# Patient Record
Sex: FEMALE | Race: WHITE | NOT HISPANIC OR LATINO | Employment: OTHER | URBAN - METROPOLITAN AREA
[De-identification: names, ages, dates, MRNs, and addresses within clinical notes are randomized per-mention and may not be internally consistent; named-entity substitution may affect disease eponyms.]

---

## 2017-01-11 ENCOUNTER — HISTORICAL (OUTPATIENT)
Dept: INTERNAL MEDICINE | Facility: CLINIC | Age: 77
End: 2017-01-11

## 2017-02-02 ENCOUNTER — HISTORICAL (OUTPATIENT)
Dept: CARDIOLOGY | Facility: HOSPITAL | Age: 77
End: 2017-02-02

## 2017-02-03 ENCOUNTER — HISTORICAL (OUTPATIENT)
Dept: CARDIOLOGY | Facility: HOSPITAL | Age: 77
End: 2017-02-03

## 2017-02-08 ENCOUNTER — HISTORICAL (OUTPATIENT)
Dept: CARDIOLOGY | Facility: HOSPITAL | Age: 77
End: 2017-02-08

## 2017-02-13 ENCOUNTER — HISTORICAL (OUTPATIENT)
Dept: RADIOLOGY | Facility: HOSPITAL | Age: 77
End: 2017-02-13

## 2017-03-09 ENCOUNTER — HISTORICAL (OUTPATIENT)
Dept: CARDIOLOGY | Facility: HOSPITAL | Age: 77
End: 2017-03-09

## 2017-06-26 ENCOUNTER — HISTORICAL (OUTPATIENT)
Dept: CARDIOLOGY | Facility: CLINIC | Age: 77
End: 2017-06-26

## 2017-06-28 ENCOUNTER — HISTORICAL (OUTPATIENT)
Dept: CARDIOLOGY | Facility: HOSPITAL | Age: 77
End: 2017-06-28

## 2017-06-28 LAB
APTT PPP: 34.8 SECOND(S) (ref 23.3–37)
BUN SERPL-MCNC: 16 MG/DL (ref 7–18)
CALCIUM SERPL-MCNC: 10 MG/DL (ref 8.5–10.1)
CHLORIDE SERPL-SCNC: 108 MMOL/L (ref 98–107)
CO2 SERPL-SCNC: 28 MMOL/L (ref 21–32)
CREAT SERPL-MCNC: 0.8 MG/DL (ref 0.6–1.3)
ERYTHROCYTE [DISTWIDTH] IN BLOOD BY AUTOMATED COUNT: 13.4 % (ref 11.5–14.5)
GLUCOSE SERPL-MCNC: 111 MG/DL (ref 74–106)
HCT VFR BLD AUTO: 40.9 % (ref 35–46)
HGB BLD-MCNC: 13.8 GM/DL (ref 12–16)
INR PPP: 1.15 (ref 0.9–1.2)
MCH RBC QN AUTO: 29.2 PG (ref 26–34)
MCHC RBC AUTO-ENTMCNC: 33.7 GM/DL (ref 31–37)
MCV RBC AUTO: 86.7 FL (ref 80–100)
PLATELET # BLD AUTO: 181 X10(3)/MCL (ref 130–400)
PMV BLD AUTO: 9.2 FL (ref 7.4–10.4)
POTASSIUM SERPL-SCNC: 4.9 MMOL/L (ref 3.5–5.1)
PROTHROMBIN TIME: 14.5 SECOND(S) (ref 11.9–14.4)
RBC # BLD AUTO: 4.72 X10(6)/MCL (ref 4–5.2)
SODIUM SERPL-SCNC: 144 MMOL/L (ref 136–145)
WBC # SPEC AUTO: 6.8 X10(3)/MCL (ref 4.5–11)

## 2017-08-07 ENCOUNTER — HISTORICAL (OUTPATIENT)
Dept: CARDIOLOGY | Facility: CLINIC | Age: 77
End: 2017-08-07

## 2017-08-07 LAB
BUN SERPL-MCNC: 14 MG/DL (ref 7–18)
CALCIUM SERPL-MCNC: 9.8 MG/DL (ref 8.5–10.1)
CHLORIDE SERPL-SCNC: 106 MMOL/L (ref 98–107)
CO2 SERPL-SCNC: 28 MMOL/L (ref 21–32)
CREAT SERPL-MCNC: 0.8 MG/DL (ref 0.6–1.3)
GLUCOSE SERPL-MCNC: 125 MG/DL (ref 74–106)
POTASSIUM SERPL-SCNC: 4.9 MMOL/L (ref 3.5–5.1)
SODIUM SERPL-SCNC: 140 MMOL/L (ref 136–145)

## 2018-03-03 ENCOUNTER — HISTORICAL (OUTPATIENT)
Dept: LAB | Facility: HOSPITAL | Age: 78
End: 2018-03-03

## 2018-03-03 LAB
ALBUMIN SERPL-MCNC: 3.9 GM/DL (ref 3.4–5)
ALBUMIN/GLOB SERPL: 1 RATIO (ref 1–2)
ALP SERPL-CCNC: 65 UNIT/L (ref 45–117)
ALT SERPL-CCNC: 19 UNIT/L (ref 12–78)
AST SERPL-CCNC: 32 UNIT/L (ref 15–37)
BILIRUB SERPL-MCNC: 0.9 MG/DL (ref 0.2–1)
BILIRUBIN DIRECT+TOT PNL SERPL-MCNC: 0.3 MG/DL
BILIRUBIN DIRECT+TOT PNL SERPL-MCNC: 0.6 MG/DL
BUN SERPL-MCNC: 13 MG/DL (ref 7–18)
CALCIUM SERPL-MCNC: 9.3 MG/DL (ref 8.5–10.1)
CHLORIDE SERPL-SCNC: 109 MMOL/L (ref 98–107)
CHOLEST SERPL-MCNC: 108 MG/DL
CHOLEST/HDLC SERPL: 2 {RATIO} (ref 0–4.4)
CO2 SERPL-SCNC: 29 MMOL/L (ref 21–32)
CREAT SERPL-MCNC: 0.7 MG/DL (ref 0.6–1.3)
GLOBULIN SER-MCNC: 3.8 GM/ML (ref 2.3–3.5)
GLUCOSE SERPL-MCNC: 116 MG/DL (ref 74–106)
HDLC SERPL-MCNC: 55 MG/DL
LDLC SERPL CALC-MCNC: 34 MG/DL (ref 0–130)
POTASSIUM SERPL-SCNC: 4.7 MMOL/L (ref 3.5–5.1)
PROT SERPL-MCNC: 7.7 GM/DL (ref 6.4–8.2)
SODIUM SERPL-SCNC: 142 MMOL/L (ref 136–145)
TRIGL SERPL-MCNC: 95 MG/DL
VLDLC SERPL CALC-MCNC: 19 MG/DL

## 2018-03-23 ENCOUNTER — HISTORICAL (OUTPATIENT)
Dept: INTERNAL MEDICINE | Facility: CLINIC | Age: 78
End: 2018-03-23

## 2018-09-24 ENCOUNTER — HISTORICAL (OUTPATIENT)
Dept: CARDIOLOGY | Facility: CLINIC | Age: 78
End: 2018-09-24

## 2018-09-24 LAB
ALBUMIN SERPL-MCNC: 4 GM/DL (ref 3.4–5)
ALBUMIN/GLOB SERPL: 1 RATIO (ref 1–2)
ALP SERPL-CCNC: 66 UNIT/L (ref 45–117)
ALT SERPL-CCNC: 26 UNIT/L (ref 12–78)
AST SERPL-CCNC: 38 UNIT/L (ref 15–37)
BILIRUB SERPL-MCNC: 1.3 MG/DL (ref 0.2–1)
BILIRUBIN DIRECT+TOT PNL SERPL-MCNC: 0.3 MG/DL
BILIRUBIN DIRECT+TOT PNL SERPL-MCNC: 1 MG/DL
BUN SERPL-MCNC: 16 MG/DL (ref 7–18)
CALCIUM SERPL-MCNC: 9.8 MG/DL (ref 8.5–10.1)
CHLORIDE SERPL-SCNC: 108 MMOL/L (ref 98–107)
CHOLEST SERPL-MCNC: 95 MG/DL
CHOLEST/HDLC SERPL: 2.2 {RATIO} (ref 0–4.4)
CO2 SERPL-SCNC: 30 MMOL/L (ref 21–32)
CREAT SERPL-MCNC: 0.9 MG/DL (ref 0.6–1.3)
GLOBULIN SER-MCNC: 3.9 GM/ML (ref 2.3–3.5)
GLUCOSE SERPL-MCNC: 102 MG/DL (ref 74–106)
HDLC SERPL-MCNC: 44 MG/DL
LDLC SERPL CALC-MCNC: 36 MG/DL (ref 0–130)
POTASSIUM SERPL-SCNC: 5.1 MMOL/L (ref 3.5–5.1)
PROT SERPL-MCNC: 7.9 GM/DL (ref 6.4–8.2)
SODIUM SERPL-SCNC: 141 MMOL/L (ref 136–145)
TRIGL SERPL-MCNC: 75 MG/DL
VLDLC SERPL CALC-MCNC: 15 MG/DL

## 2019-03-21 ENCOUNTER — HISTORICAL (OUTPATIENT)
Dept: CARDIOLOGY | Facility: CLINIC | Age: 79
End: 2019-03-21

## 2019-03-21 LAB
ALBUMIN SERPL-MCNC: 3.7 GM/DL (ref 3.4–5)
ALBUMIN/GLOB SERPL: 1.1 RATIO (ref 1.1–2)
ALP SERPL-CCNC: 66 UNIT/L (ref 45–117)
ALT SERPL-CCNC: 52 UNIT/L (ref 12–78)
AST SERPL-CCNC: 62 UNIT/L (ref 15–37)
BILIRUB SERPL-MCNC: 0.7 MG/DL (ref 0.2–1)
BILIRUBIN DIRECT+TOT PNL SERPL-MCNC: 0.2 MG/DL
BILIRUBIN DIRECT+TOT PNL SERPL-MCNC: 0.5 MG/DL
BUN SERPL-MCNC: 10 MG/DL (ref 7–18)
CALCIUM SERPL-MCNC: 9.5 MG/DL (ref 8.5–10.1)
CHLORIDE SERPL-SCNC: 111 MMOL/L (ref 98–107)
CHOLEST SERPL-MCNC: 102 MG/DL
CHOLEST/HDLC SERPL: 2 {RATIO} (ref 0–4.4)
CO2 SERPL-SCNC: 28 MMOL/L (ref 21–32)
CREAT SERPL-MCNC: 0.8 MG/DL (ref 0.6–1.3)
GLOBULIN SER-MCNC: 3.5 GM/ML (ref 2.3–3.5)
GLUCOSE SERPL-MCNC: 102 MG/DL (ref 74–106)
HDLC SERPL-MCNC: 52 MG/DL
LDLC SERPL CALC-MCNC: 39 MG/DL (ref 0–130)
POTASSIUM SERPL-SCNC: 5 MMOL/L (ref 3.5–5.1)
PROT SERPL-MCNC: 7.2 GM/DL (ref 6.4–8.2)
SODIUM SERPL-SCNC: 142 MMOL/L (ref 136–145)
TRIGL SERPL-MCNC: 54 MG/DL
VLDLC SERPL CALC-MCNC: 11 MG/DL

## 2020-06-25 ENCOUNTER — HISTORICAL (OUTPATIENT)
Dept: RADIOLOGY | Facility: HOSPITAL | Age: 80
End: 2020-06-25

## 2020-09-11 ENCOUNTER — HISTORICAL (OUTPATIENT)
Dept: LAB | Facility: HOSPITAL | Age: 80
End: 2020-09-11

## 2020-09-11 LAB
ABS NEUT (OLG): 2.68 X10(3)/MCL (ref 2.1–9.2)
ALBUMIN SERPL-MCNC: 3.8 GM/DL (ref 3.4–5)
ALBUMIN/GLOB SERPL: 1 RATIO (ref 1.1–2)
ALP SERPL-CCNC: 66 UNIT/L (ref 45–117)
ALT SERPL-CCNC: 25 UNIT/L (ref 12–78)
APPEARANCE, UA: CLEAR
AST SERPL-CCNC: 39 UNIT/L (ref 15–37)
BACTERIA #/AREA URNS AUTO: ABNORMAL /HPF
BASOPHILS # BLD AUTO: 0 X10(3)/MCL (ref 0–0.2)
BASOPHILS NFR BLD AUTO: 1 %
BILIRUB SERPL-MCNC: 1.1 MG/DL (ref 0.2–1)
BILIRUB UR QL STRIP: NEGATIVE
BILIRUBIN DIRECT+TOT PNL SERPL-MCNC: 0.3 MG/DL (ref 0–0.2)
BILIRUBIN DIRECT+TOT PNL SERPL-MCNC: 0.8 MG/DL
BUN SERPL-MCNC: 13 MG/DL (ref 7–18)
CALCIUM SERPL-MCNC: 10 MG/DL (ref 8.5–10.1)
CHLORIDE SERPL-SCNC: 110 MMOL/L (ref 98–107)
CHOLEST SERPL-MCNC: 105 MG/DL
CHOLEST/HDLC SERPL: 2.1 {RATIO} (ref 0–4.4)
CO2 SERPL-SCNC: 28 MMOL/L (ref 21–32)
COLOR UR: ABNORMAL
CREAT SERPL-MCNC: 0.8 MG/DL (ref 0.6–1.3)
EOSINOPHIL # BLD AUTO: 0.2 X10(3)/MCL (ref 0–0.9)
EOSINOPHIL NFR BLD AUTO: 3 %
ERYTHROCYTE [DISTWIDTH] IN BLOOD BY AUTOMATED COUNT: 13.6 % (ref 11.5–14.5)
GLOBULIN SER-MCNC: 3.7 GM/ML (ref 2.3–3.5)
GLUCOSE (UA): NEGATIVE
GLUCOSE SERPL-MCNC: 120 MG/DL (ref 74–106)
HCT VFR BLD AUTO: 39.2 % (ref 35–46)
HDLC SERPL-MCNC: 50 MG/DL (ref 40–59)
HGB BLD-MCNC: 12.6 GM/DL (ref 12–16)
HGB UR QL STRIP: NEGATIVE
HYALINE CASTS #/AREA URNS LPF: ABNORMAL /LPF
IMM GRANULOCYTES # BLD AUTO: 0.01 10*3/UL
IMM GRANULOCYTES NFR BLD AUTO: 0 %
KETONES UR QL STRIP: NEGATIVE
LDLC SERPL CALC-MCNC: 40 MG/DL
LEUKOCYTE ESTERASE UR QL STRIP: 75 LEU/UL
LYMPHOCYTES # BLD AUTO: 2.3 X10(3)/MCL (ref 0.6–4.6)
LYMPHOCYTES NFR BLD AUTO: 40 %
MCH RBC QN AUTO: 29.2 PG (ref 26–34)
MCHC RBC AUTO-ENTMCNC: 32.1 GM/DL (ref 31–37)
MCV RBC AUTO: 91 FL (ref 80–100)
MONOCYTES # BLD AUTO: 0.5 X10(3)/MCL (ref 0.1–1.3)
MONOCYTES NFR BLD AUTO: 9 %
NEUTROPHILS # BLD AUTO: 2.68 X10(3)/MCL (ref 2.1–9.2)
NEUTROPHILS NFR BLD AUTO: 47 %
NITRITE UR QL STRIP: ABNORMAL
PH UR STRIP: 6.5 [PH] (ref 4.5–8)
PLATELET # BLD AUTO: 144 X10(3)/MCL (ref 130–400)
PMV BLD AUTO: 9.5 FL (ref 7.4–10.4)
POTASSIUM SERPL-SCNC: 4.5 MMOL/L (ref 3.5–5.1)
PROT SERPL-MCNC: 7.5 GM/DL (ref 6.4–8.2)
PROT UR QL STRIP: NEGATIVE
RBC # BLD AUTO: 4.31 X10(6)/MCL (ref 4–5.2)
RBC #/AREA URNS AUTO: ABNORMAL /HPF
SODIUM SERPL-SCNC: 143 MMOL/L (ref 136–145)
SP GR UR STRIP: 1.01 (ref 1–1.03)
SQUAMOUS #/AREA URNS LPF: ABNORMAL /LPF
TRIGL SERPL-MCNC: 77 MG/DL
UROBILINOGEN UR STRIP-ACNC: NORMAL
VLDLC SERPL CALC-MCNC: 15 MG/DL
WBC # SPEC AUTO: 5.7 X10(3)/MCL (ref 4.5–11)
WBC #/AREA URNS AUTO: ABNORMAL /HPF

## 2020-11-06 ENCOUNTER — HISTORICAL (OUTPATIENT)
Dept: RADIOLOGY | Facility: HOSPITAL | Age: 80
End: 2020-11-06

## 2021-08-06 ENCOUNTER — HISTORICAL (OUTPATIENT)
Dept: ADMINISTRATIVE | Facility: HOSPITAL | Age: 81
End: 2021-08-06

## 2021-08-06 LAB
ABS NEUT (OLG): 2.77 X10(3)/MCL (ref 2.1–9.2)
ALBUMIN SERPL-MCNC: 3.8 GM/DL (ref 3.4–4.8)
ALBUMIN/GLOB SERPL: 1.2 RATIO (ref 1.1–2)
ALP SERPL-CCNC: 63 UNIT/L (ref 40–150)
ALT SERPL-CCNC: 16 UNIT/L (ref 0–55)
AST SERPL-CCNC: 34 UNIT/L (ref 5–34)
BASOPHILS # BLD AUTO: 0 X10(3)/MCL (ref 0–0.2)
BASOPHILS NFR BLD AUTO: 1 %
BILIRUB SERPL-MCNC: 1.2 MG/DL
BILIRUBIN DIRECT+TOT PNL SERPL-MCNC: 0.5 MG/DL (ref 0–0.5)
BILIRUBIN DIRECT+TOT PNL SERPL-MCNC: 0.7 MG/DL (ref 0–0.8)
BUN SERPL-MCNC: 12 MG/DL (ref 9.8–20.1)
CALCIUM SERPL-MCNC: 10.3 MG/DL (ref 8.4–10.2)
CHLORIDE SERPL-SCNC: 110 MMOL/L (ref 98–107)
CHOLEST SERPL-MCNC: 101 MG/DL
CHOLEST/HDLC SERPL: 3 {RATIO} (ref 0–5)
CO2 SERPL-SCNC: 25 MMOL/L (ref 23–31)
CREAT SERPL-MCNC: 0.77 MG/DL (ref 0.55–1.02)
EOSINOPHIL # BLD AUTO: 0.2 X10(3)/MCL (ref 0–0.9)
EOSINOPHIL NFR BLD AUTO: 3 %
ERYTHROCYTE [DISTWIDTH] IN BLOOD BY AUTOMATED COUNT: 13.6 % (ref 11.5–14.5)
GLOBULIN SER-MCNC: 3.1 GM/DL (ref 2.4–3.5)
GLUCOSE SERPL-MCNC: 111 MG/DL (ref 82–115)
HCT VFR BLD AUTO: 38.3 % (ref 35–46)
HDLC SERPL-MCNC: 39 MG/DL (ref 35–60)
HGB BLD-MCNC: 12.5 GM/DL (ref 12–16)
IMM GRANULOCYTES # BLD AUTO: 0.01 10*3/UL
IMM GRANULOCYTES NFR BLD AUTO: 0 %
LDLC SERPL CALC-MCNC: 49 MG/DL (ref 50–140)
LYMPHOCYTES # BLD AUTO: 2.1 X10(3)/MCL (ref 0.6–4.6)
LYMPHOCYTES NFR BLD AUTO: 38 %
MCH RBC QN AUTO: 29.5 PG (ref 26–34)
MCHC RBC AUTO-ENTMCNC: 32.6 GM/DL (ref 31–37)
MCV RBC AUTO: 90.3 FL (ref 80–100)
MONOCYTES # BLD AUTO: 0.5 X10(3)/MCL (ref 0.1–1.3)
MONOCYTES NFR BLD AUTO: 9 %
NEUTROPHILS # BLD AUTO: 2.77 X10(3)/MCL (ref 2.1–9.2)
NEUTROPHILS NFR BLD AUTO: 50 %
NRBC BLD AUTO-RTO: 0 % (ref 0–0.2)
PLATELET # BLD AUTO: 166 X10(3)/MCL (ref 130–400)
PMV BLD AUTO: 9.9 FL (ref 7.4–10.4)
POTASSIUM SERPL-SCNC: 4.4 MMOL/L (ref 3.5–5.1)
PROT SERPL-MCNC: 6.9 GM/DL (ref 5.8–7.6)
RBC # BLD AUTO: 4.24 X10(6)/MCL (ref 4–5.2)
SODIUM SERPL-SCNC: 141 MMOL/L (ref 136–145)
TRIGL SERPL-MCNC: 67 MG/DL (ref 37–140)
VLDLC SERPL CALC-MCNC: 13 MG/DL
WBC # SPEC AUTO: 5.6 X10(3)/MCL (ref 4.5–11)

## 2021-08-19 ENCOUNTER — HISTORICAL (OUTPATIENT)
Dept: INFECTIOUS DISEASES | Facility: HOSPITAL | Age: 81
End: 2021-08-19

## 2022-01-03 ENCOUNTER — HISTORICAL (OUTPATIENT)
Dept: ADMINISTRATIVE | Facility: HOSPITAL | Age: 82
End: 2022-01-03

## 2022-02-08 ENCOUNTER — HISTORICAL (OUTPATIENT)
Dept: ADMINISTRATIVE | Facility: HOSPITAL | Age: 82
End: 2022-02-08

## 2022-04-10 ENCOUNTER — HISTORICAL (OUTPATIENT)
Dept: ADMINISTRATIVE | Facility: HOSPITAL | Age: 82
End: 2022-04-10
Payer: MEDICAID

## 2022-04-18 ENCOUNTER — HISTORICAL (OUTPATIENT)
Dept: RESPIRATORY THERAPY | Facility: HOSPITAL | Age: 82
End: 2022-04-18
Payer: MEDICAID

## 2022-04-26 VITALS
OXYGEN SATURATION: 100 % | WEIGHT: 214.5 LBS | DIASTOLIC BLOOD PRESSURE: 82 MMHG | HEIGHT: 63 IN | BODY MASS INDEX: 38.01 KG/M2 | SYSTOLIC BLOOD PRESSURE: 130 MMHG

## 2022-06-01 ENCOUNTER — OFFICE VISIT (OUTPATIENT)
Dept: URGENT CARE | Facility: CLINIC | Age: 82
End: 2022-06-01
Payer: MEDICAID

## 2022-06-01 VITALS
WEIGHT: 216.81 LBS | HEIGHT: 63 IN | TEMPERATURE: 98 F | SYSTOLIC BLOOD PRESSURE: 99 MMHG | OXYGEN SATURATION: 98 % | RESPIRATION RATE: 20 BRPM | DIASTOLIC BLOOD PRESSURE: 67 MMHG | HEART RATE: 88 BPM | BODY MASS INDEX: 38.41 KG/M2

## 2022-06-01 DIAGNOSIS — Z11.52 ENCOUNTER FOR SCREENING FOR COVID-19: Primary | ICD-10-CM

## 2022-06-01 DIAGNOSIS — U07.1 COVID-19 VIRUS DETECTED: ICD-10-CM

## 2022-06-01 LAB
CTP QC/QA: YES
SARS-COV-2 RDRP RESP QL NAA+PROBE: POSITIVE

## 2022-06-01 PROCEDURE — 99203 OFFICE O/P NEW LOW 30 MIN: CPT | Mod: S$PBB,,, | Performed by: NURSE PRACTITIONER

## 2022-06-01 PROCEDURE — 99203 PR OFFICE/OUTPT VISIT, NEW, LEVL III, 30-44 MIN: ICD-10-PCS | Mod: S$PBB,,, | Performed by: NURSE PRACTITIONER

## 2022-06-01 PROCEDURE — 99214 OFFICE O/P EST MOD 30 MIN: CPT | Mod: PBBFAC | Performed by: NURSE PRACTITIONER

## 2022-06-01 PROCEDURE — U0002 COVID-19 LAB TEST NON-CDC: HCPCS | Mod: PBBFAC | Performed by: NURSE PRACTITIONER

## 2022-06-01 RX ORDER — LISINOPRIL 10 MG/1
TABLET ORAL
COMMUNITY
End: 2022-07-28

## 2022-06-01 RX ORDER — LOSARTAN POTASSIUM 25 MG/1
25 TABLET ORAL DAILY
COMMUNITY
Start: 2022-03-30 | End: 2022-10-31 | Stop reason: SDUPTHER

## 2022-06-01 RX ORDER — ATORVASTATIN CALCIUM 40 MG/1
TABLET, FILM COATED ORAL
COMMUNITY
End: 2022-10-31 | Stop reason: SDUPTHER

## 2022-06-01 RX ORDER — FLUTICASONE PROPIONATE 50 MCG
SPRAY, SUSPENSION (ML) NASAL
COMMUNITY
Start: 2022-03-05 | End: 2022-07-28

## 2022-06-01 RX ORDER — APIXABAN 5 MG/1
5 TABLET, FILM COATED ORAL 2 TIMES DAILY
COMMUNITY
Start: 2022-03-30 | End: 2022-10-31 | Stop reason: SDUPTHER

## 2022-06-01 RX ORDER — METOPROLOL TARTRATE 25 MG/1
25 TABLET, FILM COATED ORAL 2 TIMES DAILY
COMMUNITY
Start: 2022-05-09 | End: 2022-10-31 | Stop reason: SDUPTHER

## 2022-06-02 NOTE — PROGRESS NOTES
"Subjective:      Patient ID: Gilmar Vera is a 81 y.o. female.    Chief Complaint: COVID-19 Concerns (Positive at home test)    81-year-old female presents to the clinic with her daughter, patient decline translation services and request her daughter to translate.  The daughter state her  tested positive with COVID-19 where his symptoms started last week on Thursday.  The daughter states her mother started with scratchy throat, short of breath and not feeling well on Saturday.  Daughter stated her mother had COVID 19 infection last year and she had received monoclonal therapy.  Daughter tested her mother at home today and was positive for COVID-19.  Daughter requesting monoclonal therapy for her mother.  Daughter state mother complaining of a headache, scratchy throat and a cough that started yesterday with a runny nose.  Denies taking any medication for fever.  Patient is afebrile in the clinic.  Patient state her Mother feeling fatigued and run down,  Denies any nausea or vomiting or diarrhea.    Review of Systems   Constitutional: Positive for fatigue.   HENT: Positive for rhinorrhea.    Respiratory: Positive for cough.    Neurological: Positive for headaches.   All other systems reviewed and are negative.      BP 99/67   Pulse 88   Temp 98.4 °F (36.9 °C)   Resp 20   Ht 5' 2.99" (1.6 m)   Wt 98.3 kg (216 lb 12.8 oz)   SpO2 98%   BMI 38.41 kg/m²      Current Outpatient Medications:     CHLORPHENIRAMINE-DEXTROMETHORP ORAL, Coricidin HBP Cough and Cold Take 1 tablet (Oral) 4 times per day PRN - Congestion 20220305 tablet 4 times per day Oral No set duration recorded days active 4-30 mg, Disp: , Rfl:     fluticasone propionate (FLONASE ALLERGY RELIEF) 50 mcg/actuation nasal spray, Flonase Allergy Relief Take 2 spray(s) (Intranasal) 1 time per day for 7 days 20220305 spray,suspension 1 time per day Intranasal 7 days active 50 mcg/actuation, Disp: , Rfl:     atorvastatin (LIPITOR) 40 MG tablet, " atorvastatin Take No date recorded No form recorded No frequency recorded No route recorded No set duration recorded No set duration amount recorded active No dosage strength recorded No dosage strength units of measure recorded, Disp: , Rfl:     ELIQUIS 5 mg Tab, Take 5 mg by mouth 2 (two) times daily., Disp: , Rfl:     lisinopriL 10 MG tablet, Zestril Take No date recorded No form recorded No frequency recorded No route recorded No set duration recorded No set duration amount recorded active No dosage strength recorded No dosage strength units of measure recorded, Disp: , Rfl:     losartan (COZAAR) 25 MG tablet, Take 25 mg by mouth once daily., Disp: , Rfl:     metoprolol tartrate (LOPRESSOR) 25 MG tablet, Take 25 mg by mouth 2 (two) times daily., Disp: , Rfl:     Objective:     Physical Exam  Vitals and nursing note reviewed.   Constitutional:       Appearance: Normal appearance.   HENT:      Head: Normocephalic and atraumatic.   Eyes:      Pupils: Pupils are equal, round, and reactive to light.   Cardiovascular:      Rate and Rhythm: Normal rate and regular rhythm.      Pulses: Normal pulses.      Heart sounds: Normal heart sounds.   Pulmonary:      Effort: Pulmonary effort is normal. No respiratory distress.      Breath sounds: Normal breath sounds. No wheezing or rhonchi.   Musculoskeletal:         General: Normal range of motion.      Cervical back: Normal range of motion and neck supple.      Comments: Using cane to ambulate   Skin:     General: Skin is warm.      Capillary Refill: Capillary refill takes less than 2 seconds.   Neurological:      General: No focal deficit present.      Mental Status: She is alert and oriented to person, place, and time. Mental status is at baseline.   Psychiatric:         Mood and Affect: Mood normal.         Behavior: Behavior normal.         Thought Content: Thought content normal.         Judgment: Judgment normal.       Assessment:     Problem List Items Addressed  This Visit    None     Visit Diagnoses     Encounter for screening for COVID-19    -  Primary    Relevant Orders    POCT COVID-19 Rapid Screening (Completed)          Plan:   Discussed physical exam findings with patient, discuss vital signs, discussed COVID-19 id now positive in the clinic, monoclonal therapy initiated, continue social distancing, mass when, good hygiene and follow CDC recommendation are COVID-19, stay hydrated with fluids specially water, OTC supplementation sheet provided to patient.  Instructed patients daughter to notify his/ her primary care provider regarding the visit today and schedule a follow-up appointment in 2-3 days if needed   instructed patients over to come back to the clinic or go to nearest emergency room department if symptoms worsens or no improvement or for any other reason   questions elicited and answered, monitor pulse ox at home if O2 sat drops below 9 3 and symptom worsens bring patient to the nearest emergency room department or call 911.   Patient and daughter verbalized understanding of discharge instructions,  verbalizes understanding to read discharge instructions    Encounter for screening for COVID-19  -     POCT COVID-19 Rapid Screening         Recent Lab Results       06/01/22  1908         Acceptable Yes       SARS-CoV-2 RNA, Amplification, Qual Positive                     This note was created with the assistance of a voice recognition software or  phone dictation. There may be transcription errors as a result of using this technology, however minimal effort has been made to assure accuracy of transcription, but any obvious errors or omissions should be clarified with the author of the document.

## 2022-06-25 ENCOUNTER — HOSPITAL ENCOUNTER (EMERGENCY)
Facility: HOSPITAL | Age: 82
Discharge: HOME | End: 2022-06-25
Attending: STUDENT IN AN ORGANIZED HEALTH CARE EDUCATION/TRAINING PROGRAM
Payer: MEDICAID

## 2022-06-25 ENCOUNTER — OFFICE VISIT (OUTPATIENT)
Dept: URGENT CARE | Facility: CLINIC | Age: 82
End: 2022-06-25
Payer: MEDICAID

## 2022-06-25 VITALS
RESPIRATION RATE: 18 BRPM | HEIGHT: 62 IN | SYSTOLIC BLOOD PRESSURE: 115 MMHG | WEIGHT: 213 LBS | DIASTOLIC BLOOD PRESSURE: 70 MMHG | HEART RATE: 98 BPM | TEMPERATURE: 98 F | BODY MASS INDEX: 39.2 KG/M2 | OXYGEN SATURATION: 99 %

## 2022-06-25 VITALS
DIASTOLIC BLOOD PRESSURE: 70 MMHG | RESPIRATION RATE: 18 BRPM | OXYGEN SATURATION: 95 % | TEMPERATURE: 98 F | WEIGHT: 219.88 LBS | SYSTOLIC BLOOD PRESSURE: 105 MMHG | HEIGHT: 63 IN | BODY MASS INDEX: 38.96 KG/M2 | HEART RATE: 86 BPM

## 2022-06-25 DIAGNOSIS — R20.0 NUMBNESS IN RIGHT LEG: ICD-10-CM

## 2022-06-25 DIAGNOSIS — M79.89 LEG SWELLING: ICD-10-CM

## 2022-06-25 DIAGNOSIS — R60.0 EDEMA OF RIGHT LOWER EXTREMITY: Primary | ICD-10-CM

## 2022-06-25 LAB — SARS-COV-2 RDRP RESP QL NAA+PROBE: NEGATIVE

## 2022-06-25 PROCEDURE — 99213 PR OFFICE/OUTPT VISIT, EST, LEVL III, 20-29 MIN: ICD-10-PCS | Mod: S$PBB,,, | Performed by: NURSE PRACTITIONER

## 2022-06-25 PROCEDURE — 99214 OFFICE O/P EST MOD 30 MIN: CPT | Mod: PBBFAC,25 | Performed by: NURSE PRACTITIONER

## 2022-06-25 PROCEDURE — 87635 SARS-COV-2 COVID-19 AMP PRB: CPT | Performed by: STUDENT IN AN ORGANIZED HEALTH CARE EDUCATION/TRAINING PROGRAM

## 2022-06-25 PROCEDURE — 99283 EMERGENCY DEPT VISIT LOW MDM: CPT | Mod: 25,27

## 2022-06-25 PROCEDURE — 99213 OFFICE O/P EST LOW 20 MIN: CPT | Mod: S$PBB,,, | Performed by: NURSE PRACTITIONER

## 2022-06-25 NOTE — PROGRESS NOTES
"Subjective:       Patient ID: Gilmar Vera is a 81 y.o. female.    Vitals:  height is 5' 2.99" (1.6 m) and weight is 99.7 kg (219 lb 14.4 oz). Her oral temperature is 98.3 °F (36.8 °C). Her blood pressure is 105/70 and her pulse is 86. Her respiration is 18 and oxygen saturation is 95%.     Chief Complaint: Leg Swelling (NUMBNESS, RT LEG)    Patient is an 81-year-old female, here today for right leg swelling, numbness over the past 2 days.  Patient completed by family member who helped answer questions.  States patient is on a medication for swelling, which she takes as needed, thinks the medication is furosemide but states that patient is allergic to Lasix and says this has cause leg swelling in the past.  Patient denies pain at this time.       Cardiovascular: Positive for leg swelling.   Respiratory: Negative.        Objective:      Physical Exam   Constitutional: She is oriented to person, place, and time. She appears well-developed.   HENT:   Head: Normocephalic.   Eyes: Conjunctivae and EOM are normal. Pupils are equal, round, and reactive to light.   Neck: Neck supple.   Cardiovascular: Normal rate, regular rhythm and normal heart sounds.   Pulmonary/Chest: Effort normal and breath sounds normal.   Musculoskeletal:      Right lower leg: Edema present.      Comments: Calf cirumference R- 44.5 cm, L 41.5 cm   Neurological: She is alert and oriented to person, place, and time.   Skin: Skin is warm and dry.   Psychiatric: Her behavior is normal.   Vitals reviewed.        Assessment:       1. Edema of right lower extremity    2. Numbness in right leg               No results found.   Plan:         Discussed with pt and family member, will send to ED to r/o dvt. Pt transferred to ED via  by [facility] staff.     Edema of right lower extremity  -     Refer to Emergency Dept.    Numbness in right leg  -     Refer to Emergency Dept.                   "

## 2022-06-26 NOTE — ED PROVIDER NOTES
Encounter Date: 6/25/2022       History     Chief Complaint   Patient presents with    Leg Swelling     Presents with leg swelling that suddenly started yesterday.  Sent here from clinic to rule out DVT.   Hx AFIB and is on thinners.     81-year-old female presenting today with right-sided leg swelling.  It started yesterday morning.  She has a history of leg swelling and is currently on Lasix for it.  She is also on Eliquis for AFib.  She has no pain in her right lower extremity.  She went to urgent care urgent care center here to rule out a DVT.  She is in no acute distress at this time.    The history is provided by the patient. No  was used.   General Illness   The current episode started just prior to arrival. The problem occurs continuously. The problem has been unchanged. Nothing relieves the symptoms. Nothing aggravates the symptoms. Pertinent negatives include no fever, no abdominal pain, no diarrhea, no nausea, no vomiting, no congestion, no headaches, no cough, no shortness of breath, no wheezing, no discharge, no pain and no eye redness.     Review of patient's allergies indicates:   Allergen Reactions    Furosemide      Past Medical History:   Diagnosis Date    Clotting disorder     Hyperlipidemia     Hypertension      History reviewed. No pertinent surgical history.  History reviewed. No pertinent family history.  Social History     Tobacco Use    Smoking status: Never Smoker    Smokeless tobacco: Never Used   Substance Use Topics    Alcohol use: Not Currently    Drug use: Not Currently     Review of Systems   Constitutional: Negative for activity change, chills and fever.   HENT: Negative for congestion and facial swelling.    Eyes: Negative for pain, discharge and redness.   Respiratory: Negative for cough, shortness of breath and wheezing.    Cardiovascular: Positive for leg swelling. Negative for chest pain.   Gastrointestinal: Negative for abdominal pain, diarrhea,  nausea and vomiting.   Genitourinary: Negative for difficulty urinating, vaginal bleeding and vaginal discharge.   Musculoskeletal: Negative for gait problem and neck stiffness.   Skin: Negative for color change and pallor.   Neurological: Negative for dizziness and headaches.       Physical Exam     Initial Vitals [06/25/22 1854]   BP Pulse Resp Temp SpO2   107/69 76 18 98.4 °F (36.9 °C) 96 %      MAP       --         Physical Exam    Nursing note and vitals reviewed.  Constitutional: She appears well-developed. She is not diaphoretic. No distress.   HENT:   Head: Normocephalic and atraumatic.   Eyes: Conjunctivae and EOM are normal. Right eye exhibits no discharge. Left eye exhibits no discharge.   Neck: Neck supple. No tracheal deviation present.   Normal range of motion.  Cardiovascular: Normal rate and regular rhythm.   Pulmonary/Chest: No respiratory distress.   Abdominal: Abdomen is soft. She exhibits no distension. There is no abdominal tenderness.   Musculoskeletal:         General: Normal range of motion.      Cervical back: Normal range of motion and neck supple.      Comments: Swollen right lower extremity.  Warm to the touch.  Good pulses.  Neurovascularly intact.     Neurological: She is alert and oriented to person, place, and time. She has normal strength.   Skin: Skin is warm and dry.         ED Course   Procedures  Labs Reviewed   SARS-COV-2 RNA AMPLIFICATION, QUAL          Imaging Results    None          Medications - No data to display  Medical Decision Making:   ED Management:  Pt presents to the ED to rule out a DVT    Evaluated patient in the emergency department.  She was stable well appearing.  On examination she did have mildly swollen right lower extremity.  Had good pulses and was neurovascularly intact.  Was warm.  Her DVT ultrasound was negative.  Spoke with her daughter and had her follow-up with her PCP on Monday.  She is also instructed to wear compression  stockings.  She is being  discharged home in stable condition at this time.                      Clinical Impression:   Final diagnoses:  [M79.89] Leg swelling          ED Disposition Condition    Discharge Stable        ED Prescriptions     None        Follow-up Information     Follow up With Specialties Details Why Contact Info    Francisca Estrada MD Family Medicine Call in 1 day  [address] [facility]  Gynecology Clinic  [facility] [id number] [phone]             Anderson Up MD  06/25/22 9778

## 2022-07-28 ENCOUNTER — OFFICE VISIT (OUTPATIENT)
Dept: CARDIOLOGY | Facility: CLINIC | Age: 82
End: 2022-07-28
Payer: MEDICAID

## 2022-07-28 VITALS
HEIGHT: 64 IN | OXYGEN SATURATION: 97 % | DIASTOLIC BLOOD PRESSURE: 66 MMHG | TEMPERATURE: 98 F | SYSTOLIC BLOOD PRESSURE: 120 MMHG | HEART RATE: 88 BPM | BODY MASS INDEX: 37.16 KG/M2 | WEIGHT: 217.63 LBS

## 2022-07-28 DIAGNOSIS — I50.20 HEART FAILURE WITH REDUCED EJECTION FRACTION: ICD-10-CM

## 2022-07-28 DIAGNOSIS — R06.09 DOE (DYSPNEA ON EXERTION): ICD-10-CM

## 2022-07-28 DIAGNOSIS — R60.0 PERIPHERAL EDEMA: ICD-10-CM

## 2022-07-28 DIAGNOSIS — I25.119 CORONARY ARTERY DISEASE INVOLVING NATIVE CORONARY ARTERY OF NATIVE HEART WITH ANGINA PECTORIS: ICD-10-CM

## 2022-07-28 DIAGNOSIS — I48.20 CHRONIC ATRIAL FIBRILLATION: ICD-10-CM

## 2022-07-28 PROCEDURE — 99214 PR OFFICE/OUTPT VISIT, EST, LEVL IV, 30-39 MIN: ICD-10-PCS | Mod: 25,S$PBB,, | Performed by: NURSE PRACTITIONER

## 2022-07-28 PROCEDURE — 3074F SYST BP LT 130 MM HG: CPT | Mod: CPTII,,, | Performed by: NURSE PRACTITIONER

## 2022-07-28 PROCEDURE — 1160F PR REVIEW ALL MEDS BY PRESCRIBER/CLIN PHARMACIST DOCUMENTED: ICD-10-PCS | Mod: CPTII,,, | Performed by: NURSE PRACTITIONER

## 2022-07-28 PROCEDURE — 3288F FALL RISK ASSESSMENT DOCD: CPT | Mod: CPTII,,, | Performed by: NURSE PRACTITIONER

## 2022-07-28 PROCEDURE — 1159F MED LIST DOCD IN RCRD: CPT | Mod: CPTII,,, | Performed by: NURSE PRACTITIONER

## 2022-07-28 PROCEDURE — 3288F PR FALLS RISK ASSESSMENT DOCUMENTED: ICD-10-PCS | Mod: CPTII,,, | Performed by: NURSE PRACTITIONER

## 2022-07-28 PROCEDURE — 3074F PR MOST RECENT SYSTOLIC BLOOD PRESSURE < 130 MM HG: ICD-10-PCS | Mod: CPTII,,, | Performed by: NURSE PRACTITIONER

## 2022-07-28 PROCEDURE — 1101F PR PT FALLS ASSESS DOC 0-1 FALLS W/OUT INJ PAST YR: ICD-10-PCS | Mod: CPTII,,, | Performed by: NURSE PRACTITIONER

## 2022-07-28 PROCEDURE — 1160F RVW MEDS BY RX/DR IN RCRD: CPT | Mod: CPTII,,, | Performed by: NURSE PRACTITIONER

## 2022-07-28 PROCEDURE — 99214 OFFICE O/P EST MOD 30 MIN: CPT | Mod: 25,S$PBB,, | Performed by: NURSE PRACTITIONER

## 2022-07-28 PROCEDURE — 3078F PR MOST RECENT DIASTOLIC BLOOD PRESSURE < 80 MM HG: ICD-10-PCS | Mod: CPTII,,, | Performed by: NURSE PRACTITIONER

## 2022-07-28 PROCEDURE — 1101F PT FALLS ASSESS-DOCD LE1/YR: CPT | Mod: CPTII,,, | Performed by: NURSE PRACTITIONER

## 2022-07-28 PROCEDURE — 99213 OFFICE O/P EST LOW 20 MIN: CPT | Mod: PBBFAC | Performed by: NURSE PRACTITIONER

## 2022-07-28 PROCEDURE — 1159F PR MEDICATION LIST DOCUMENTED IN MEDICAL RECORD: ICD-10-PCS | Mod: CPTII,,, | Performed by: NURSE PRACTITIONER

## 2022-07-28 PROCEDURE — 3078F DIAST BP <80 MM HG: CPT | Mod: CPTII,,, | Performed by: NURSE PRACTITIONER

## 2022-07-28 PROCEDURE — 93005 ELECTROCARDIOGRAM TRACING: CPT

## 2022-07-28 RX ORDER — ASPIRIN 81 MG/1
81 TABLET ORAL DAILY
COMMUNITY

## 2022-07-28 NOTE — PROGRESS NOTES
CHIEF COMPLAINT:   Chief Complaint   Patient presents with    3mt f/u.     Pt reports having COVID 1 month ago and has noted fatigue since.                   Review of patient's allergies indicates:   Allergen Reactions    Furosemide                                           HPI:  Gilmar Vera 81 y.o. female with a past medical history of CAD (55% stenosis of RCA), Decreased LVEF - NICM (EF 40% - now 55%), Atrial Fibrillation and Severe MR. She completed a Lexiscan Stress Test on 06/25/20 which showed lateral-apical ischemia with no scar, EF of 90% with no wall motion abnormality. At a previous appointment in June 2020, it was decided to medically manage as patient was asymptomatic at the time. The plan was to proceed with coronary angiogram if patient began to have symptoms. When patient followed up in clinic in Dec. 2020, she reported symptoms of chest pain and increased fatigue. She was scheduled to undergo coronary angiogram procedure at that time, but asked to reschedule procedure until after she received her Covid 19 vaccine. At her clinic visit in March 2021, she stated she would like to continue to hold off on coronary angiogram procedure as she stated she was actually feeling better.    Patient's daughter is translating during clinic visit.     Patient states she was diagnosed with COVID for the 2nd time in June 2022. Since that time, she does report increased fatigue.  She continues to endorse shortness of breath with exertion, but has not been very active since her diagnosis of COVID last month.  She has no current complaints of chest pain, palpitations, or syncope.  She had a recent ER visit on 6.25.22 due to RLE edema in which RLE venous US ruled out DVT.  She reports compliance with her medications.      Lexiscan Stress Test 6/2020:   Lexiscan stress test June 2020:  Scan is consistent with: Lateral-apical ischemia  No scar  Ejection fraction: 90%  No wall motion abnormality    Echocardiogram  (12/2019): EF of 50 to 55% with mild MR, mild TR and RVSP of 26 mmHg    [facility] (2017): Dominant RCA with moderate atherosclerotic plaque and 55% eccentric stenosis proximal mid vessel, LAD with mild atherosclerotic plaque and 30% distal stenosis, 3-4+ MR, Normal LVEF 60%                                                                                                                                                                                                                                               Patient Active Problem List   Diagnosis    Heart failure with reduced ejection fraction    Chronic atrial fibrillation    Coronary artery disease involving native coronary artery of native heart with angina pectoris     History reviewed. No pertinent surgical history.  Social History     Socioeconomic History    Marital status:    Tobacco Use    Smoking status: Never Smoker    Smokeless tobacco: Never Used   Substance and Sexual Activity    Alcohol use: Not Currently    Drug use: Not Currently    Sexual activity: Not Currently        No family history on file.      Current Outpatient Medications:     atorvastatin (LIPITOR) 40 MG tablet, atorvastatin Take No date recorded No form recorded No frequency recorded No route recorded No set duration recorded No set duration amount recorded active No dosage strength recorded No dosage strength units of measure recorded, Disp: , Rfl:     ELIQUIS 5 mg Tab, Take 5 mg by mouth 2 (two) times daily., Disp: , Rfl:     losartan (COZAAR) 25 MG tablet, Take 25 mg by mouth once daily., Disp: , Rfl:     metoprolol tartrate (LOPRESSOR) 25 MG tablet, Take 25 mg by mouth 2 (two) times daily., Disp: , Rfl:     aspirin (ECOTRIN) 81 MG EC tablet, Take 81 mg by mouth once daily., Disp: , Rfl:     CHLORPHENIRAMINE-DEXTROMETHORP ORAL, Coricidin HBP Cough and Cold Take 1 tablet (Oral) 4 times per day PRN - Congestion 20220305 tablet 4 times per day Oral No set duration  recorded days active 4-30 mg, Disp: , Rfl:     fluticasone propionate (FLONASE) 50 mcg/actuation nasal spray, Flonase Allergy Relief Take 2 spray(s) (Intranasal) 1 time per day for 7 days 20220305 spray,suspension 1 time per day Intranasal 7 days active 50 mcg/actuation, Disp: , Rfl:      ROS:                                                                                                                                                                             Review of Systems   Constitutional: Positive for malaise/fatigue.   Respiratory: Positive for shortness of breath.    Cardiovascular: Positive for leg swelling.   Gastrointestinal: Negative.    Musculoskeletal: Positive for joint pain.   Skin: Negative.    Neurological: Negative.    Psychiatric/Behavioral: Negative.         There were no vitals taken for this visit.   PE:  Physical Exam  Constitutional:       Appearance: Normal appearance.   HENT:      Head: Normocephalic and atraumatic.   Eyes:      Extraocular Movements: Extraocular movements intact.      Pupils: Pupils are equal, round, and reactive to light.   Cardiovascular:      Rate and Rhythm: Normal rate. Rhythm irregular.   Pulmonary:      Effort: Pulmonary effort is normal.      Breath sounds: Normal breath sounds.   Abdominal:      Palpations: Abdomen is soft.   Musculoskeletal:         General: Normal range of motion.      Cervical back: Normal range of motion. No tenderness.      Right lower leg: Edema present.      Left lower leg: Edema present.   Skin:     General: Skin is warm and dry.   Neurological:      General: No focal deficit present.      Mental Status: She is alert and oriented to person, place, and time.   Psychiatric:         Mood and Affect: Mood normal.         Behavior: Behavior normal.       ASSESSMENT/PLAN:  HFrEF -[facility] Class II-III  Echo from 12/2019 showed EF of 50 to 55% with mild MR, mild TR and RVSP of 26 mmHg (improved from EF 40%)  Continue GDMT with Losartan and  Metoprolol Tartrate  She reports SOB with exertion  Recent ER visit on 6.25.22 for peripheral edema  Will repeat Echocardiogram   Counseled on low salt diet    CAD  [facility] (2017) showed dominant RCA with moderate atherosclerotic plaque and 55% stenosis in proximal and mid vessel, LAD with mid atherosclerotic plaque and 30% distal stenosis, 3-4+ MR, LVEF 60%  Lexiscan on 06/25/2020 which was positive for lateral-apical ischemia with no scar, EF of 90% with no wall motion abnormality  Denies chest pain - endorses SOB with exertion - Dr. Shah does not feel patient needs coronary angiogram at this time   Continue Aspirin 81 mg, Atorvastatin 40 mg, Losartan 25 mg, Metoprolol Tartrate 25 mg twice daily, and SL Nitro prn CP  Counseled on heart healthy diet and increased activity as tolerated    Atrial Fibrillation  Continue Metoprolol Tartrate 25 mg twice daily  Continued on Eliquis 5 mg twice daily  Denies palpitations    EKG today  Echocardiogram  Follow up in Cardiology Clinic in 3 months  Follow up with PCP as directed

## 2022-09-30 ENCOUNTER — HOSPITAL ENCOUNTER (OUTPATIENT)
Dept: CARDIOLOGY | Facility: HOSPITAL | Age: 82
Discharge: HOME | End: 2022-09-30
Attending: NURSE PRACTITIONER
Payer: MEDICAID

## 2022-09-30 DIAGNOSIS — R60.0 PERIPHERAL EDEMA: ICD-10-CM

## 2022-09-30 DIAGNOSIS — R06.09 DOE (DYSPNEA ON EXERTION): ICD-10-CM

## 2022-09-30 LAB
AV INDEX (PROSTH): 0.66
AV MEAN GRADIENT: 3 MMHG
AV PEAK GRADIENT: 5 MMHG
AV VALVE AREA: 2.04 CM2
AV VELOCITY RATIO: 0.71
CV ECHO LV RWT: 0.41 CM
DOP CALC AO PEAK VEL: 1.12 M/S
DOP CALC AO VTI: 25.6 CM
DOP CALC LVOT AREA: 3.1 CM2
DOP CALC LVOT DIAMETER: 1.99 CM
DOP CALC LVOT PEAK VEL: 0.8 M/S
DOP CALC LVOT STROKE VOLUME: 52.23 CM3
DOP CALC MV VTI: 27.9 CM
DOP CALCLVOT PEAK VEL VTI: 16.8 CM
E WAVE DECELERATION TIME: 201.62 MSEC
E/A RATIO: 3.29
ECHO LV POSTERIOR WALL: 1.01 CM (ref 0.6–1.1)
EJECTION FRACTION: 50 %
FRACTIONAL SHORTENING: 25 % (ref 28–44)
HR MV ECHO: 73 BPM
INTERVENTRICULAR SEPTUM: 0.95 CM (ref 0.6–1.1)
LEFT ATRIUM SIZE: 4.63 CM
LEFT INTERNAL DIMENSION IN SYSTOLE: 3.69 CM (ref 2.1–4)
LEFT VENTRICLE DIASTOLIC VOLUME: 112.27 ML
LEFT VENTRICLE SYSTOLIC VOLUME: 57.71 ML
LEFT VENTRICULAR INTERNAL DIMENSION IN DIASTOLE: 4.89 CM (ref 3.5–6)
LEFT VENTRICULAR MASS: 170.73 G
LV LATERAL E/E' RATIO: 13.89 M/S
LVOT MG: 1.34 MMHG
LVOT MV: 0.54 CM/S
MV MEAN GRADIENT: 2 MMHG
MV PEAK A VEL: 0.38 M/S
MV PEAK E VEL: 1.25 M/S
MV PEAK GRADIENT: 6 MMHG
MV STENOSIS PRESSURE HALF TIME: 58.47 MS
MV VALVE AREA BY CONTINUITY EQUATION: 1.87 CM2
MV VALVE AREA P 1/2 METHOD: 3.76 CM2
PISA MRMAX VEL: 5.17 M/S
PISA TR MAX VEL: 3.13 M/S
RA WIDTH: 5.5 CM
TDI LATERAL: 0.09 M/S
TR MAX PG: 39 MMHG
TRICUSPID ANNULAR PLANE SYSTOLIC EXCURSION: 1.6 CM

## 2022-09-30 PROCEDURE — 93306 TTE W/DOPPLER COMPLETE: CPT

## 2022-10-06 ENCOUNTER — OFFICE VISIT (OUTPATIENT)
Dept: FAMILY MEDICINE | Facility: CLINIC | Age: 82
End: 2022-10-06
Payer: MEDICAID

## 2022-10-06 VITALS
RESPIRATION RATE: 18 BRPM | WEIGHT: 211.44 LBS | SYSTOLIC BLOOD PRESSURE: 114 MMHG | BODY MASS INDEX: 35.23 KG/M2 | DIASTOLIC BLOOD PRESSURE: 72 MMHG | TEMPERATURE: 98 F | HEART RATE: 88 BPM | HEIGHT: 65 IN

## 2022-10-06 DIAGNOSIS — H93.19 TINNITUS, UNSPECIFIED LATERALITY: ICD-10-CM

## 2022-10-06 DIAGNOSIS — R26.81 GAIT INSTABILITY: ICD-10-CM

## 2022-10-06 DIAGNOSIS — M19.90 OSTEOARTHRITIS, UNSPECIFIED OSTEOARTHRITIS TYPE, UNSPECIFIED SITE: ICD-10-CM

## 2022-10-06 DIAGNOSIS — K59.00 CONSTIPATION, UNSPECIFIED CONSTIPATION TYPE: ICD-10-CM

## 2022-10-06 DIAGNOSIS — H91.90 HEARING LOSS, UNSPECIFIED HEARING LOSS TYPE, UNSPECIFIED LATERALITY: Primary | ICD-10-CM

## 2022-10-06 DIAGNOSIS — Z23 IMMUNIZATION DUE: ICD-10-CM

## 2022-10-06 PROCEDURE — 99214 OFFICE O/P EST MOD 30 MIN: CPT | Mod: S$PBB,,, | Performed by: FAMILY MEDICINE

## 2022-10-06 PROCEDURE — 3074F SYST BP LT 130 MM HG: CPT | Mod: CPTII,,, | Performed by: FAMILY MEDICINE

## 2022-10-06 PROCEDURE — 3074F PR MOST RECENT SYSTOLIC BLOOD PRESSURE < 130 MM HG: ICD-10-PCS | Mod: CPTII,,, | Performed by: FAMILY MEDICINE

## 2022-10-06 PROCEDURE — 90694 VACC AIIV4 NO PRSRV 0.5ML IM: CPT | Mod: PBBFAC

## 2022-10-06 PROCEDURE — 1126F AMNT PAIN NOTED NONE PRSNT: CPT | Mod: CPTII,,, | Performed by: FAMILY MEDICINE

## 2022-10-06 PROCEDURE — 99215 OFFICE O/P EST HI 40 MIN: CPT | Mod: PBBFAC | Performed by: FAMILY MEDICINE

## 2022-10-06 PROCEDURE — 3078F PR MOST RECENT DIASTOLIC BLOOD PRESSURE < 80 MM HG: ICD-10-PCS | Mod: CPTII,,, | Performed by: FAMILY MEDICINE

## 2022-10-06 PROCEDURE — 1126F PR PAIN SEVERITY QUANTIFIED, NO PAIN PRESENT: ICD-10-PCS | Mod: CPTII,,, | Performed by: FAMILY MEDICINE

## 2022-10-06 PROCEDURE — 3078F DIAST BP <80 MM HG: CPT | Mod: CPTII,,, | Performed by: FAMILY MEDICINE

## 2022-10-06 PROCEDURE — 99214 PR OFFICE/OUTPT VISIT, EST, LEVL IV, 30-39 MIN: ICD-10-PCS | Mod: S$PBB,,, | Performed by: FAMILY MEDICINE

## 2022-10-06 PROCEDURE — 1159F PR MEDICATION LIST DOCUMENTED IN MEDICAL RECORD: ICD-10-PCS | Mod: CPTII,,, | Performed by: FAMILY MEDICINE

## 2022-10-06 PROCEDURE — 1159F MED LIST DOCD IN RCRD: CPT | Mod: CPTII,,, | Performed by: FAMILY MEDICINE

## 2022-10-06 RX ORDER — FLUTICASONE PROPIONATE 50 MCG
SPRAY, SUSPENSION (ML) NASAL
COMMUNITY
Start: 2022-03-05 | End: 2023-03-15

## 2022-10-06 RX ORDER — NITROGLYCERIN 0.4 MG/1
TABLET SUBLINGUAL
COMMUNITY
Start: 2022-07-28 | End: 2024-04-02 | Stop reason: SDUPTHER

## 2022-10-06 RX ORDER — POLYETHYLENE GLYCOL 3350 17 G/17G
17 POWDER, FOR SOLUTION ORAL DAILY
Qty: 473 EACH | Refills: 5 | Status: SHIPPED | OUTPATIENT
Start: 2022-10-06 | End: 2023-09-15 | Stop reason: SDUPTHER

## 2022-10-06 NOTE — PROGRESS NOTES
Deysigage Chuy  10/06/2022  [id number]              Chief Complaint:  Chief Complaint   Patient presents with    Establish Care       History of Present Illness:  81 year-old female with a past medical history of CAD (55% stenosis of RCA), Decreased LVEF - NICM (EF 40% - now 55%), Atrial Fibrillation and Severe MR that presents to establish care. Previously followed with Dr. Martins.   Patient reporting worsening hearing loss, bilateral. Also has ringing in ear. Symptoms initally worsened after COVID- delta variant  Also has constipation. Chronic issue. A previous constipation medication caused dizziness. Unsure of the name.  Recently started using walker sometimes as she feels unstable with just a cane. Reports legs get tired. Interested in PT and home exercises  Would like flu shot today  Follows with cardiology      History:  Past Medical History:   Diagnosis Date    Clotting disorder     Hyperlipidemia     Hypertension      Past Surgical History:   Procedure Laterality Date    APPENDECTOMY      HIP REPLACEMENT ARTHROPLASTY      JOINT REPLACEMENT  2016    tolsillectomy       Family History   Problem Relation Age of Onset    Stroke Mother     Hypertension Mother     Kidney failure Father      Social History     Socioeconomic History    Marital status:    Tobacco Use    Smoking status: Former     Packs/day: 0.50     Years: 15.00     Pack years: 7.50     Types: Cigarettes     Start date: 1968     Quit date: 2010     Years since quittin.7    Smokeless tobacco: Never    Tobacco comments:     I stop smoking more than 10 years ago   Substance and Sexual Activity    Alcohol use: Not Currently    Drug use: Not Currently    Sexual activity: Not Currently     Patient Active Problem List   Diagnosis    Heart failure with reduced ejection fraction    Chronic atrial fibrillation    Coronary artery disease involving native coronary artery of native heart with angina pectoris    RHOADES (dyspnea on exertion)     Peripheral edema     Review of patient's allergies indicates:   Allergen Reactions    Furosemide          Medications:  Current Outpatient Medications on File Prior to Visit   Medication Sig Dispense Refill    aspirin (ECOTRIN) 81 MG EC tablet Take 81 mg by mouth once daily.      atorvastatin (LIPITOR) 40 MG tablet atorvastatin Take No date recorded No form recorded No frequency recorded No route recorded No set duration recorded No set duration amount recorded active No dosage strength recorded No dosage strength units of measure recorded      ELIQUIS 5 mg Tab Take 5 mg by mouth 2 (two) times daily.      losartan (COZAAR) 25 MG tablet Take 25 mg by mouth once daily.      metoprolol tartrate (LOPRESSOR) 25 MG tablet Take 25 mg by mouth 2 (two) times daily.      nitroGLYCERIN (NITROSTAT) 0.4 MG SL tablet DISSOLVE 1 TABLET UNDER THE TONGUE EVERY 5 MINUTES AS NEEDED FOR CHEST PAIN UP TO 3 DOSES IN 15 MINUTES. IF NO RELIEF, GO TO ER      fluticasone propionate (FLONASE) 50 mcg/actuation nasal spray Flonase Allergy Relief Take 2 spray(s) (Intranasal) 1 time per day for 7 days 20220305 spray,suspension 1 time per day Intranasal 7 days active 50 mcg/actuation       No current facility-administered medications on file prior to visit.       Medications have been reviewed and reconciled with patient at this visit.    Adverse reactions to current medications reviewed with patient..      Exam:  Wt Readings from Last 3 Encounters:   10/06/22 95.9 kg (211 lb 6.7 oz)   07/28/22 98.7 kg (217 lb 9.5 oz)   06/25/22 99.7 kg (219 lb 14.4 oz)     Temp Readings from Last 3 Encounters:   10/06/22 98.1 °F (36.7 °C)   07/28/22 98.1 °F (36.7 °C)   06/25/22 98.3 °F (36.8 °C) (Oral)     BP Readings from Last 3 Encounters:   10/06/22 114/72   07/28/22 120/66   06/25/22 105/70     Pulse Readings from Last 3 Encounters:   10/06/22 88   07/28/22 88   06/25/22 86     Body mass index is 35.66 kg/m².      ROS:  See HPI for details      All Other ROS:  Negative except as stated in HPI.    PE:  General: Alert and oriented, No acute distress. Accompanied by daughter  Head: Normocephalic, Atraumatic.  Eye: Pupils are equal, round and reactive to light, Extraocular movements are intact, Sclera non-icteric.  Ears/Nose/Throat: Normal, Mucosa moist,Clear.  Neck/Thyroid: Supple, Non-tender, No carotid bruit, No palpable thyromegaly or thyroid nodule,   Respiratory: Clear to auscultation bilaterally; No wheezes, rales or rhonchi,Non-labored respirations, Symmetrical chest wall expansion.  Cardiovascular: Regular rate and rhythm, S1/S2 normal, No murmurs, rubs or gallops.  Gastrointestinal: Soft, Non-tender, Non-distended, Normal bowel sounds, No palpable organomegaly.  Musculoskeletal: Normal range of motion. Walks with a cane  Integumentary: Warm, Dry, Intact, No suspicious lesions or rashes.  Extremities: No clubbing,mild edema  Neurologic: No focal deficits,   Psychiatric: Normal interaction, Coherent speech, Euthymic mood, Appropriate affect    Laboratory Reviewed ({Yes)  Lab Results   Component Value Date    WBC 5.5 08/18/2021    HGB 13.1 08/18/2021    HCT 39.2 08/18/2021     08/18/2021    CHOL 101 08/06/2021    TRIG 67 08/06/2021    HDL 39 08/06/2021    ALT 18 08/18/2021    AST 35 (H) 08/18/2021     08/18/2021    K 4.1 08/18/2021    CREATININE 0.76 08/18/2021    BUN 5.8 (L) 08/18/2021    CO2 26 08/18/2021    INR 1.40 (H) 07/21/2017       Gilmar was seen today for establish care.    Diagnoses and all orders for this visit:    Hearing loss, unspecified hearing loss type, unspecified laterality  -     Ambulatory referral/consult to Audiology; Future  -     Ambulatory referral/consult to ENT; Future    Immunization due  -     Influenza (FLUAD) - Quadrivalent (Adjuvanted) *Preferred* (65+) (PF)    Constipation, unspecified constipation type    Gait instability  -     Ambulatory referral/consult to Physical/Occupational Therapy; Future    Osteoarthritis,  unspecified osteoarthritis type, unspecified site  -     Ambulatory referral/consult to Physical/Occupational Therapy; Future    Other orders  -     polyethylene glycol (GLYCOLAX) 17 gram PwPk; Take 17 g by mouth once daily.        Assessment as above  Referred to audiology and ENT for hearing loss, tinnitus  Referred to PT for OA and gait training  Miralax for constipation; contact clinic if side effects are experienced  Keep follow up with cards  Discussed healthy diet, lifestyle modifications  Flu shot given today          Care Plan/Goals: Reviewed    Goals    None         Follow up: No follow-ups on file.

## 2022-10-31 ENCOUNTER — OFFICE VISIT (OUTPATIENT)
Dept: OTOLARYNGOLOGY | Facility: CLINIC | Age: 82
End: 2022-10-31
Payer: MEDICAID

## 2022-10-31 ENCOUNTER — CLINICAL SUPPORT (OUTPATIENT)
Dept: AUDIOLOGY | Facility: HOSPITAL | Age: 82
End: 2022-10-31
Payer: MEDICAID

## 2022-10-31 ENCOUNTER — OFFICE VISIT (OUTPATIENT)
Dept: CARDIOLOGY | Facility: CLINIC | Age: 82
End: 2022-10-31
Payer: MEDICAID

## 2022-10-31 VITALS
BODY MASS INDEX: 34.99 KG/M2 | SYSTOLIC BLOOD PRESSURE: 111 MMHG | DIASTOLIC BLOOD PRESSURE: 75 MMHG | WEIGHT: 210 LBS | TEMPERATURE: 99 F | HEIGHT: 65 IN | HEART RATE: 71 BPM

## 2022-10-31 VITALS
RESPIRATION RATE: 18 BRPM | WEIGHT: 208.56 LBS | HEIGHT: 65 IN | SYSTOLIC BLOOD PRESSURE: 110 MMHG | HEART RATE: 86 BPM | TEMPERATURE: 98 F | OXYGEN SATURATION: 98 % | DIASTOLIC BLOOD PRESSURE: 74 MMHG | BODY MASS INDEX: 34.75 KG/M2

## 2022-10-31 DIAGNOSIS — H90.3 SENSORINEURAL HEARING LOSS (SNHL) OF BOTH EARS: ICD-10-CM

## 2022-10-31 DIAGNOSIS — H90.3 SENSORINEURAL HEARING LOSS, BILATERAL: Primary | ICD-10-CM

## 2022-10-31 DIAGNOSIS — H91.90 HEARING LOSS, UNSPECIFIED HEARING LOSS TYPE, UNSPECIFIED LATERALITY: ICD-10-CM

## 2022-10-31 DIAGNOSIS — R60.0 PERIPHERAL EDEMA: ICD-10-CM

## 2022-10-31 DIAGNOSIS — I48.20 CHRONIC ATRIAL FIBRILLATION: ICD-10-CM

## 2022-10-31 DIAGNOSIS — I50.20 HEART FAILURE WITH REDUCED EJECTION FRACTION: ICD-10-CM

## 2022-10-31 DIAGNOSIS — H93.19 TINNITUS, UNSPECIFIED LATERALITY: ICD-10-CM

## 2022-10-31 DIAGNOSIS — I25.10 CORONARY ARTERY DISEASE INVOLVING NATIVE CORONARY ARTERY OF NATIVE HEART WITHOUT ANGINA PECTORIS: Primary | ICD-10-CM

## 2022-10-31 DIAGNOSIS — R06.09 DOE (DYSPNEA ON EXERTION): ICD-10-CM

## 2022-10-31 PROBLEM — I25.119 CORONARY ARTERY DISEASE INVOLVING NATIVE CORONARY ARTERY OF NATIVE HEART WITH ANGINA PECTORIS: Status: RESOLVED | Noted: 2022-07-28 | Resolved: 2022-10-31

## 2022-10-31 PROCEDURE — 92553 AUDIOMETRY AIR & BONE: CPT | Performed by: AUDIOLOGIST

## 2022-10-31 PROCEDURE — 3074F PR MOST RECENT SYSTOLIC BLOOD PRESSURE < 130 MM HG: ICD-10-PCS | Mod: CPTII,,, | Performed by: NURSE PRACTITIONER

## 2022-10-31 PROCEDURE — 3078F PR MOST RECENT DIASTOLIC BLOOD PRESSURE < 80 MM HG: ICD-10-PCS | Mod: CPTII,,, | Performed by: NURSE PRACTITIONER

## 2022-10-31 PROCEDURE — 1159F PR MEDICATION LIST DOCUMENTED IN MEDICAL RECORD: ICD-10-PCS | Mod: CPTII,,, | Performed by: NURSE PRACTITIONER

## 2022-10-31 PROCEDURE — 1126F PR PAIN SEVERITY QUANTIFIED, NO PAIN PRESENT: ICD-10-PCS | Mod: CPTII,,, | Performed by: NURSE PRACTITIONER

## 2022-10-31 PROCEDURE — 1101F PT FALLS ASSESS-DOCD LE1/YR: CPT | Mod: CPTII,,, | Performed by: NURSE PRACTITIONER

## 2022-10-31 PROCEDURE — 3288F PR FALLS RISK ASSESSMENT DOCUMENTED: ICD-10-PCS | Mod: CPTII,,, | Performed by: NURSE PRACTITIONER

## 2022-10-31 PROCEDURE — 3078F DIAST BP <80 MM HG: CPT | Mod: CPTII,,, | Performed by: NURSE PRACTITIONER

## 2022-10-31 PROCEDURE — 1159F MED LIST DOCD IN RCRD: CPT | Mod: CPTII,,, | Performed by: NURSE PRACTITIONER

## 2022-10-31 PROCEDURE — 1160F RVW MEDS BY RX/DR IN RCRD: CPT | Mod: CPTII,,, | Performed by: NURSE PRACTITIONER

## 2022-10-31 PROCEDURE — 1126F AMNT PAIN NOTED NONE PRSNT: CPT | Mod: CPTII,,, | Performed by: NURSE PRACTITIONER

## 2022-10-31 PROCEDURE — 99213 OFFICE O/P EST LOW 20 MIN: CPT | Mod: S$PBB,,, | Performed by: NURSE PRACTITIONER

## 2022-10-31 PROCEDURE — 3074F SYST BP LT 130 MM HG: CPT | Mod: CPTII,,, | Performed by: NURSE PRACTITIONER

## 2022-10-31 PROCEDURE — 3288F FALL RISK ASSESSMENT DOCD: CPT | Mod: CPTII,,, | Performed by: NURSE PRACTITIONER

## 2022-10-31 PROCEDURE — 99213 PR OFFICE/OUTPT VISIT, EST, LEVL III, 20-29 MIN: ICD-10-PCS | Mod: S$PBB,,, | Performed by: NURSE PRACTITIONER

## 2022-10-31 PROCEDURE — 1160F PR REVIEW ALL MEDS BY PRESCRIBER/CLIN PHARMACIST DOCUMENTED: ICD-10-PCS | Mod: CPTII,,, | Performed by: NURSE PRACTITIONER

## 2022-10-31 PROCEDURE — 99214 OFFICE O/P EST MOD 30 MIN: CPT | Mod: PBBFAC,27 | Performed by: NURSE PRACTITIONER

## 2022-10-31 PROCEDURE — 99214 OFFICE O/P EST MOD 30 MIN: CPT | Mod: PBBFAC | Performed by: NURSE PRACTITIONER

## 2022-10-31 PROCEDURE — 99214 OFFICE O/P EST MOD 30 MIN: CPT | Mod: S$PBB,,, | Performed by: NURSE PRACTITIONER

## 2022-10-31 PROCEDURE — 1101F PR PT FALLS ASSESS DOC 0-1 FALLS W/OUT INJ PAST YR: ICD-10-PCS | Mod: CPTII,,, | Performed by: NURSE PRACTITIONER

## 2022-10-31 PROCEDURE — 99214 PR OFFICE/OUTPT VISIT, EST, LEVL IV, 30-39 MIN: ICD-10-PCS | Mod: S$PBB,,, | Performed by: NURSE PRACTITIONER

## 2022-10-31 PROCEDURE — 92550 TYMPANOMETRY & REFLEX THRESH: CPT | Performed by: AUDIOLOGIST

## 2022-10-31 RX ORDER — FUROSEMIDE 20 MG/1
20 TABLET ORAL DAILY PRN
Qty: 180 TABLET | Refills: 3 | Status: SHIPPED | OUTPATIENT
Start: 2022-10-31 | End: 2023-11-01 | Stop reason: SDUPTHER

## 2022-10-31 RX ORDER — ATORVASTATIN CALCIUM 40 MG/1
40 TABLET, FILM COATED ORAL DAILY
Qty: 90 TABLET | Refills: 3 | Status: SHIPPED | OUTPATIENT
Start: 2022-10-31 | End: 2023-11-01 | Stop reason: SDUPTHER

## 2022-10-31 RX ORDER — LOSARTAN POTASSIUM 25 MG/1
25 TABLET ORAL DAILY
Qty: 90 TABLET | Refills: 3 | Status: SHIPPED | OUTPATIENT
Start: 2022-10-31 | End: 2023-11-01 | Stop reason: SDUPTHER

## 2022-10-31 RX ORDER — APIXABAN 5 MG/1
5 TABLET, FILM COATED ORAL 2 TIMES DAILY
Qty: 180 TABLET | Refills: 3 | Status: SHIPPED | OUTPATIENT
Start: 2022-10-31 | End: 2023-11-01 | Stop reason: SDUPTHER

## 2022-10-31 RX ORDER — METOPROLOL TARTRATE 25 MG/1
25 TABLET, FILM COATED ORAL 2 TIMES DAILY
Qty: 180 TABLET | Refills: 3 | Status: SHIPPED | OUTPATIENT
Start: 2022-10-31 | End: 2023-11-01 | Stop reason: SDUPTHER

## 2022-10-31 NOTE — PROGRESS NOTES
Hearing Evaluation        Patient History: Mrs. Vera was accompanied by her daughter who interpreted all history. She reports Mrs. Vera has a gradual decrease in hearing, most noticeable about a year ago following COVID.  She also reports constant bilateral tinnitus. Vertigo and middle ear issues are denied. All additional history is unremarkable.        Test Results:                    Pure Tone Testing:      Right ear:       Mild to moderately severe sensorineural hearing loss from 1k-8kHz.       Left ear:          Mild to moderately severe sensorineural hearing loss from 1k-8kHz.                                                                              Tympanometry:                                           Right ear:       Type 'A' tymp, normal middle ear pressure/function     Left ear:          Type 'A' tymp, normal middle ear pressure/function                                           Interpretations:      Behavioral test findings indicate a mild to moderately severe SNHL, bilaterally. Speech and discrimination scores unobtainable due to language barrier.  Immittance measures indicate normal middle ear pressure/function, bilaterally.            Recommendations:   1. Annual hearing evaluations  2. Binaural amplification (Information on [place] [employer] given)

## 2022-10-31 NOTE — PROGRESS NOTES
CHIEF COMPLAINT:   Chief Complaint   Patient presents with    Follow-up     3 mos f/u w/echo denies cardiac targets                   Review of patient's allergies indicates:   Allergen Reactions    Furosemide                                           HPI:  Gilmar Vera 81 y.o. female with a past medical history of CAD (55% stenosis of RCA), Decreased LVEF - NICM (EF 40% - now 55%), Atrial Fibrillation and Severe MR presents for 3 month follow up and ongoing care.  She completed a Lexiscan Stress Test on 06/25/20 which showed lateral-apical ischemia with no scar, EF of 90% with no wall motion abnormality. At a previous appointment in June 2020, it was decided to medically manage as patient was asymptomatic at the time. The plan was to proceed with coronary angiogram if patient began to have symptoms. When patient followed up in clinic in Dec. 2020, she reported symptoms of chest pain and increased fatigue. She was scheduled to undergo coronary angiogram procedure at that time, but asked to reschedule procedure until after she received her Covid 19 vaccine. At her clinic visit in March 2021, she stated she would like to continue to hold off on coronary angiogram procedure as she stated she was actually feeling better.    Patient's daughter is translating during clinic visit.     Patient states she is feeling much better and has no significant cardiac complaints at this time.  She denies chest pain or syncope.  She reports improvement in her bilateral lower extremity edema.  Patient states she is more active and is also in physical therapy for strengthening and mobility.  She reports compliance with her medications.    Echocardiogram 9.30.22:  The left ventricle is normal in size with eccentric hypertrophy and low normal systolic function.  The estimated ejection fraction is 50%.  Indeterminate left ventricular diastolic function.  Mild right ventricular enlargement with low normal right ventricular systolic  function.  Moderate right atrial enlargement.  Mild to moderate pulmonic regurgitation.  Mild-to-moderate mitral regurgitation.  Mild to moderate tricuspid regurgitation.  Mild left atrial enlargement.  There is mild pulmonary hypertension.    Lexiscan Stress Test 2020:   Lexiscan stress test 2020:  Scan is consistent with: Lateral-apical ischemia  No scar  Ejection fraction: 90%  No wall motion abnormality    Echocardiogram (2019): EF of 50 to 55% with mild MR, mild TR and RVSP of 26 mmHg    LHC (2017): Dominant RCA with moderate atherosclerotic plaque and 55% eccentric stenosis proximal mid vessel, LAD with mild atherosclerotic plaque and 30% distal stenosis, 3-4+ MR, Normal LVEF 60%                                                                                                                                                                                                                                               Patient Active Problem List   Diagnosis    Heart failure with reduced ejection fraction    Chronic atrial fibrillation    Coronary artery disease involving native coronary artery of native heart with angina pectoris    RHOADES (dyspnea on exertion)    Peripheral edema     Past Surgical History:   Procedure Laterality Date    APPENDECTOMY      HIP REPLACEMENT ARTHROPLASTY      HIP SURGERY  7 years ago    JOINT REPLACEMENT  2016    tolsillectomy       Social History     Socioeconomic History    Marital status:    Tobacco Use    Smoking status: Former     Packs/day: 0.50     Years: 15.00     Pack years: 7.50     Types: Cigarettes     Start date: 1968     Quit date: 2010     Years since quittin.8    Smokeless tobacco: Never    Tobacco comments:     I stop smoking more than 10 years ago   Substance and Sexual Activity    Alcohol use: Not Currently    Drug use: Not Currently    Sexual activity: Not Currently        Family History   Problem Relation Age of Onset    Stroke Mother      "Hypertension Mother     Kidney failure Father          Current Outpatient Medications:     aspirin (ECOTRIN) 81 MG EC tablet, Take 81 mg by mouth once daily., Disp: , Rfl:     atorvastatin (LIPITOR) 40 MG tablet, atorvastatin Take No date recorded No form recorded No frequency recorded No route recorded No set duration recorded No set duration amount recorded active No dosage strength recorded No dosage strength units of measure recorded, Disp: , Rfl:     ELIQUIS 5 mg Tab, Take 5 mg by mouth 2 (two) times daily., Disp: , Rfl:     losartan (COZAAR) 25 MG tablet, Take 25 mg by mouth once daily., Disp: , Rfl:     metoprolol tartrate (LOPRESSOR) 25 MG tablet, Take 25 mg by mouth 2 (two) times daily., Disp: , Rfl:     nitroGLYCERIN (NITROSTAT) 0.4 MG SL tablet, DISSOLVE 1 TABLET UNDER THE TONGUE EVERY 5 MINUTES AS NEEDED FOR CHEST PAIN UP TO 3 DOSES IN 15 MINUTES. IF NO RELIEF, GO TO ER, Disp: , Rfl:     fluticasone propionate (FLONASE) 50 mcg/actuation nasal spray, Flonase Allergy Relief Take 2 spray(s) (Intranasal) 1 time per day for 7 days 20220305 spray,suspension 1 time per day Intranasal 7 days active 50 mcg/actuation, Disp: , Rfl:     polyethylene glycol (GLYCOLAX) 17 gram PwPk, Take 17 g by mouth once daily. (Patient not taking: Reported on 10/31/2022), Disp: 473 each, Rfl: 5     ROS:                                                                                                                                                                             Review of Systems   Respiratory:  Positive for shortness of breath.    Cardiovascular:  Positive for leg swelling.   Gastrointestinal: Negative.    Skin: Negative.    Neurological: Negative.    Psychiatric/Behavioral: Negative.        Blood pressure 110/74, pulse 86, temperature 97.5 °F (36.4 °C), resp. rate 18, height 5' 5" (1.651 m), weight 94.6 kg (208 lb 8.9 oz), SpO2 98 %.   PE:  Physical Exam  Constitutional:       Appearance: Normal appearance.   HENT:      " Head: Normocephalic and atraumatic.   Eyes:      Extraocular Movements: Extraocular movements intact.      Pupils: Pupils are equal, round, and reactive to light.   Cardiovascular:      Rate and Rhythm: Normal rate. Rhythm irregular.   Pulmonary:      Effort: Pulmonary effort is normal.      Breath sounds: Normal breath sounds.   Abdominal:      Palpations: Abdomen is soft.   Musculoskeletal:         General: Normal range of motion.      Cervical back: Normal range of motion.      Right lower leg: Edema present.      Left lower leg: Edema present.   Skin:     General: Skin is warm and dry.   Neurological:      General: No focal deficit present.      Mental Status: She is alert and oriented to person, place, and time.   Psychiatric:         Mood and Affect: Mood normal.         Behavior: Behavior normal.     ASSESSMENT/PLAN:  HFrEF - recovered EF 50% Sept. 2022 - [facility] Class II  Echo from 12/2019 showed EF of 50 to 55% with mild MR, mild TR and RVSP of 26 mmHg (improved from EF 40%)  Continue GDMT with Losartan and Metoprolol Tartrate  Counseled on low salt diet    CAD  LHC (2017) showed dominant RCA with moderate atherosclerotic plaque and 55% stenosis in proximal and mid vessel, LAD with mid atherosclerotic plaque and 30% distal stenosis, 3-4+ MR, LVEF 60%  Lexiscan on 06/25/2020 which was positive for lateral-apical ischemia with no scar, EF of 90% with no wall motion abnormality  Denies chest pain  Continue Aspirin 81 mg, Atorvastatin 40 mg, Losartan 25 mg, Metoprolol Tartrate 25 mg twice daily, and SL Nitro prn CP  Counseled on heart healthy diet and increased activity as tolerated    Atrial Fibrillation  Denies palpitations  Continue Metoprolol Tartrate 25 mg twice daily  Continued on Eliquis 5 mg twice daily    CMP, FLP  Follow up in Cardiology Clinic in 6 months  Follow up with PCP as directed

## 2022-10-31 NOTE — PROGRESS NOTES
[facility]  Otolaryngology Clinic Note    Gilmar Vera  Encounter Date: 10/31/2022  YOB: 1940    Chief Complaint: hearing loss    HPI: 10/31/2022: 81yoF referred for hearing loss. She reports this began about 8 years ago, but has progressively worsened. This was markedly noticed after she had covid a year ago. She denies otalgia, otorrhea, or dizziness. She does endorse intermittent tinnitus described as buzzing. Denies any hx of otologic infections, procedures, or otologic surgeries. Has had a little runny nose for a few days but it not interested in treatment.    ROS:   10-point review of systems negative except per HPI      Review of patient's allergies indicates:   Allergen Reactions    Furosemide        Past Medical History:   Diagnosis Date    CHF (congestive heart failure) See records    Clotting disorder     Hearing loss 2 years ago    After Covid it got worse    Hyperlipidemia     Hypertension     Obesity N/a       Past Surgical History:   Procedure Laterality Date    APPENDECTOMY      HIP REPLACEMENT ARTHROPLASTY      HIP SURGERY  7 years ago    JOINT REPLACEMENT  2016    tolsillectomy         Social History     Socioeconomic History    Marital status:    Tobacco Use    Smoking status: Former     Packs/day: 0.50     Years: 15.00     Pack years: 7.50     Types: Cigarettes     Start date: 1968     Quit date: 2010     Years since quittin.8    Smokeless tobacco: Never    Tobacco comments:     I stop smoking more than 10 years ago   Substance and Sexual Activity    Alcohol use: Not Currently    Drug use: Not Currently    Sexual activity: Not Currently       Family History   Problem Relation Age of Onset    Stroke Mother     Hypertension Mother     Kidney failure Father        Outpatient Encounter Medications as of 10/31/2022   Medication Sig Dispense Refill    aspirin (ECOTRIN) 81 MG EC tablet Take 81 mg by mouth once daily.      atorvastatin (LIPITOR)  "40 MG tablet atorvastatin Take No date recorded No form recorded No frequency recorded No route recorded No set duration recorded No set duration amount recorded active No dosage strength recorded No dosage strength units of measure recorded      ELIQUIS 5 mg Tab Take 5 mg by mouth 2 (two) times daily.      fluticasone propionate (FLONASE) 50 mcg/actuation nasal spray Flonase Allergy Relief Take 2 spray(s) (Intranasal) 1 time per day for 7 days 20220305 spray,suspension 1 time per day Intranasal 7 days active 50 mcg/actuation      losartan (COZAAR) 25 MG tablet Take 25 mg by mouth once daily.      metoprolol tartrate (LOPRESSOR) 25 MG tablet Take 25 mg by mouth 2 (two) times daily.      nitroGLYCERIN (NITROSTAT) 0.4 MG SL tablet DISSOLVE 1 TABLET UNDER THE TONGUE EVERY 5 MINUTES AS NEEDED FOR CHEST PAIN UP TO 3 DOSES IN 15 MINUTES. IF NO RELIEF, GO TO ER      polyethylene glycol (GLYCOLAX) 17 gram PwPk Take 17 g by mouth once daily. 473 each 5     No facility-administered encounter medications on file as of 10/31/2022.       Physical Exam:  Vitals:    10/31/22 0942   BP: 111/75   BP Location: Right arm   Patient Position: Sitting   BP Method: Large (Automatic)   Pulse: 71   Temp: 98.5 °F (36.9 °C)   TempSrc: Oral   Weight: 95.3 kg (210 lb)   Height: 5' 4.57" (1.64 m)     General: NAD, voice normal  Neuro: AAO, CN II - XII grossly intact  Head/ Face: NCAT, symmetric, sensations intact bilaterally  Eyes: EOMI, PERRL  Ears: externally normal with grossly normal hearing  AD: EAC patent, TM intact, no middle ear effusion, no retractions  AS: EAC patent, TM intact, no middle ear effusion, no retractions  Nose: bilateral nares patent, midline septum, mucoid rhinorrhea, no external deformity, +ITH  OC/OP: MMM, no intraoral lesions, no trismus, dentition is moderate, no uvular deviation, bilaterally symmetric soft palate elevation, palatoglossus and palatopharyngeal fold wnl; tonsils are symmetric/absent  Indirect " laryngoscopy: deferred due to patient intolerance  Neck: soft, supple, no LAD, normal ROM, no thyromegaly  Respiratory: nonlabored, no wheezing, bilateral chest rise  Cardiovascular: RRR  Gastrointestinal: S NT ND  Skin: warm, no lesions  Musculoskeletal: 5/5 strength  Psych: Appropriate affect/mood     Pertinent Data:  ? LABS:  ? AUDIO:                 Assessment/Plan:  81 y.o. female referred for hearing loss. Mild to moderately severe b/l SNHL- audio reviewed with pt & daughter.  - Medically cleared for amplification  - Annual audio  - RTC 1 year     Zara Leal NP

## 2022-12-12 ENCOUNTER — OFFICE VISIT (OUTPATIENT)
Dept: URGENT CARE | Facility: CLINIC | Age: 82
End: 2022-12-12
Payer: MEDICAID

## 2022-12-12 VITALS
DIASTOLIC BLOOD PRESSURE: 74 MMHG | WEIGHT: 205.94 LBS | SYSTOLIC BLOOD PRESSURE: 113 MMHG | RESPIRATION RATE: 20 BRPM | BODY MASS INDEX: 36.49 KG/M2 | OXYGEN SATURATION: 97 % | HEIGHT: 63 IN | TEMPERATURE: 98 F | HEART RATE: 89 BPM

## 2022-12-12 DIAGNOSIS — J02.9 SORE THROAT: Primary | ICD-10-CM

## 2022-12-12 DIAGNOSIS — R09.81 NASAL CONGESTION: ICD-10-CM

## 2022-12-12 DIAGNOSIS — B34.9 VIRAL SYNDROME: ICD-10-CM

## 2022-12-12 LAB
FLUAV AG UPPER RESP QL IA.RAPID: NOT DETECTED
FLUBV AG UPPER RESP QL IA.RAPID: NOT DETECTED
RSV A 5' UTR RNA NPH QL NAA+PROBE: NOT DETECTED
SARS-COV-2 RNA RESP QL NAA+PROBE: DETECTED
STREP A PCR (OHS): NOT DETECTED

## 2022-12-12 PROCEDURE — 99214 OFFICE O/P EST MOD 30 MIN: CPT | Mod: PBBFAC | Performed by: FAMILY MEDICINE

## 2022-12-12 PROCEDURE — 99213 PR OFFICE/OUTPT VISIT, EST, LEVL III, 20-29 MIN: ICD-10-PCS | Mod: S$PBB,,, | Performed by: FAMILY MEDICINE

## 2022-12-12 PROCEDURE — 0241U COVID/RSV/FLU A&B PCR: CPT | Performed by: FAMILY MEDICINE

## 2022-12-12 PROCEDURE — 99213 OFFICE O/P EST LOW 20 MIN: CPT | Mod: S$PBB,,, | Performed by: FAMILY MEDICINE

## 2022-12-12 PROCEDURE — 87651 STREP A DNA AMP PROBE: CPT | Performed by: FAMILY MEDICINE

## 2022-12-12 RX ORDER — OSELTAMIVIR PHOSPHATE 75 MG/1
75 CAPSULE ORAL 2 TIMES DAILY
Qty: 10 CAPSULE | Refills: 0 | Status: SHIPPED | OUTPATIENT
Start: 2022-12-12 | End: 2022-12-17

## 2022-12-13 NOTE — PROGRESS NOTES
"Subjective:       Patient ID: Gilmar Vera is a 81 y.o. female.    Vitals:  height is 5' 3.39" (1.61 m) and weight is 93.4 kg (205 lb 14.6 oz). Her temperature is 98.1 °F (36.7 °C). Her blood pressure is 113/74 and her pulse is 89. Her respiration is 20 and oxygen saturation is 97%.     Chief Complaint: Sinus Problem (X 2days) and Sore Throat    Sinus Problem  Associated symptoms include a sore throat.   Sore Throat       Patient with 2 days of clear rhinorrhea, scratchy throat, mild cough, subjective fever.  Grandchild with recent influenza.  They live together.    HENT:  Positive for sore throat.      Constitutional: negative except as stated in HPI  Eye: negative except as stated in HPI  ENT: negative except as stated in HPI  Respiratory: negative except as stated in HPI  Cardiovascular: negative except as stated in HPI  Gastrointestinal: negative except as stated in HPI  Genitourinary: negative except as stated in HPI  Objective:      Physical Exam   Constitutional: She appears well-developed.   HENT:   Head: Atraumatic.   Nose: Rhinorrhea present. No purulent discharge. Right sinus exhibits no maxillary sinus tenderness and no frontal sinus tenderness. Left sinus exhibits no maxillary sinus tenderness and no frontal sinus tenderness.   Mouth/Throat: Posterior oropharyngeal erythema present. No oropharyngeal exudate.   Eyes: Conjunctivae are normal.   Neck: Neck supple.   Cardiovascular: Regular rhythm.   Pulmonary/Chest: Effort normal and breath sounds normal. No respiratory distress. She has no wheezes. She has no rhonchi. She has no rales.   Lymphadenopathy:     She has no cervical adenopathy.   Neurological: She is alert.   Skin: Skin is warm and dry.   Nursing note and vitals reviewed.      Assessment:       1. Sore throat    2. Nasal congestion    3. Viral syndrome            Plan:         Sore throat  -     Strep Group A by PCR    Nasal congestion  -     COVID/RSV/FLU A&B PCR    Viral syndrome    Other " orders  -     oseltamivir (TAMIFLU) 75 MG capsule; Take 1 capsule (75 mg total) by mouth 2 (two) times daily. for 5 days  Dispense: 10 capsule; Refill: 0         Secondary to recent exposure and illness, will give a course of Tamiflu.  Discussed contagious precautions.  Increase fluids.  Please follow instructions on patient education material.  Return to urgent care in 2 to 3 days if symptoms are not improving, immediately if you develop any new or worsening symptoms.

## 2022-12-23 ENCOUNTER — HOSPITAL ENCOUNTER (OUTPATIENT)
Dept: RADIOLOGY | Facility: HOSPITAL | Age: 82
Discharge: HOME | End: 2022-12-23
Attending: NURSE PRACTITIONER
Payer: MEDICAID

## 2022-12-23 ENCOUNTER — OFFICE VISIT (OUTPATIENT)
Dept: URGENT CARE | Facility: CLINIC | Age: 82
End: 2022-12-23
Payer: MEDICAID

## 2022-12-23 VITALS
SYSTOLIC BLOOD PRESSURE: 123 MMHG | WEIGHT: 200.81 LBS | HEIGHT: 63 IN | DIASTOLIC BLOOD PRESSURE: 79 MMHG | HEART RATE: 74 BPM | RESPIRATION RATE: 18 BRPM | BODY MASS INDEX: 35.58 KG/M2 | TEMPERATURE: 99 F | OXYGEN SATURATION: 95 %

## 2022-12-23 DIAGNOSIS — U07.1 COVID-19: Primary | ICD-10-CM

## 2022-12-23 PROBLEM — E78.5 HYPERLIPIDEMIA: Status: ACTIVE | Noted: 2022-12-23

## 2022-12-23 PROBLEM — I45.9 CARDIAC CONDUCTION DISORDER: Status: ACTIVE | Noted: 2022-12-23

## 2022-12-23 PROCEDURE — 71046 X-RAY EXAM CHEST 2 VIEWS: CPT | Mod: TC

## 2022-12-23 PROCEDURE — 99213 OFFICE O/P EST LOW 20 MIN: CPT | Mod: S$PBB,,, | Performed by: NURSE PRACTITIONER

## 2022-12-23 PROCEDURE — 99213 PR OFFICE/OUTPT VISIT, EST, LEVL III, 20-29 MIN: ICD-10-PCS | Mod: S$PBB,,, | Performed by: NURSE PRACTITIONER

## 2022-12-23 PROCEDURE — 99215 OFFICE O/P EST HI 40 MIN: CPT | Mod: PBBFAC,25 | Performed by: NURSE PRACTITIONER

## 2022-12-23 RX ORDER — BENZONATATE 200 MG/1
200 CAPSULE ORAL 3 TIMES DAILY PRN
Qty: 30 CAPSULE | Refills: 0 | Status: SHIPPED | OUTPATIENT
Start: 2022-12-23 | End: 2023-01-02

## 2022-12-23 RX ORDER — BENZONATATE 200 MG/1
200 CAPSULE ORAL 3 TIMES DAILY PRN
Qty: 30 CAPSULE | Refills: 0 | Status: SHIPPED | OUTPATIENT
Start: 2022-12-23 | End: 2022-12-23

## 2022-12-23 RX ORDER — DOXYCYCLINE 100 MG/1
100 CAPSULE ORAL 2 TIMES DAILY
Qty: 14 CAPSULE | Refills: 0 | Status: SHIPPED | OUTPATIENT
Start: 2022-12-23 | End: 2023-03-15

## 2022-12-23 RX ORDER — PROMETHAZINE HYDROCHLORIDE AND DEXTROMETHORPHAN HYDROBROMIDE 6.25; 15 MG/5ML; MG/5ML
5 SYRUP ORAL EVERY 6 HOURS PRN
Qty: 100 ML | Refills: 0 | Status: SHIPPED | OUTPATIENT
Start: 2022-12-23 | End: 2022-12-28

## 2022-12-23 RX ORDER — DEXAMETHASONE SODIUM PHOSPHATE 4 MG/ML
6 INJECTION, SOLUTION INTRA-ARTICULAR; INTRALESIONAL; INTRAMUSCULAR; INTRAVENOUS; SOFT TISSUE ONCE
Status: COMPLETED | OUTPATIENT
Start: 2022-12-23 | End: 2022-12-23

## 2022-12-23 RX ADMIN — DEXAMETHASONE SODIUM PHOSPHATE 6 MG: 4 INJECTION, SOLUTION INTRA-ARTICULAR; INTRALESIONAL; INTRAMUSCULAR; INTRAVENOUS; SOFT TISSUE at 02:12

## 2022-12-23 NOTE — PATIENT INSTRUCTIONS
Benzonatate/Tessalon Pearles should not cause any dizziness, use for cough.  Promethazine-DM syrup may cause dizziness, sleepiness, use with caution when Veselka will not be alone. If first dose is not well tolerated, do not use again.    Doxycycline (antibiotic): hold this medication unless she exhibits signs of pneumonia (fever, confusion, chills/sweats, upper back pain, green/brown/yellow mucous from her cough). If you have to start this medication, consider bringing her back to Urgent Care or go to ER.     She can also use cough drops, Cepaclor, Raad's vapor rub, a cool mist humidifer.

## 2022-12-23 NOTE — PROGRESS NOTES
"Subjective:       Patient ID: Gilmar Vera is a 81 y.o. female.    Vitals:  height is 5' 3" (1.6 m) and weight is 91.1 kg (200 lb 12.8 oz). Her oral temperature is 98.7 °F (37.1 °C). Her blood pressure is 123/79 and her pulse is 74. Her respiration is 18 and oxygen saturation is 95%.     Chief Complaint: Cough (Patient presents to [facility] with c/o productive cough patient did not notice the color of sputum. Patient tested positive for covid on 12/12/2022 here at [facility].)    HPI not getting better. Had PNA a few years ago and daughter states this is how it started, with a URI/cough, then she got "no energy"/tired/weak and developed PNA. No fevers at this time. Normal appetite, energy.   ROS    Objective:      Physical Exam   Constitutional: She is oriented to person, place, and time. She does not appear ill. normal  HENT:   Nose: Congestion present. No rhinorrhea.   Eyes: Conjunctivae are normal.   Cardiovascular: Normal heart sounds.   Pulmonary/Chest: Effort normal and breath sounds normal. No stridor. No respiratory distress. She has no wheezes. She has no rhonchi. She has no rales.         Comments: Intermittent wet cough    Abdominal: Normal appearance.   Musculoskeletal: Normal range of motion.         General: Normal range of motion.   Neurological: She is alert and oriented to person, place, and time.   Skin: Skin is warm and dry.   Psychiatric: Her behavior is normal. Mood, judgment and thought content normal.   Vitals reviewed.      Assessment:       1. COVID-19          XR CHEST PA AND LATERAL    Result Date: 12/23/2022  EXAMINATION: XR CHEST PA AND LATERAL   CLINICAL HISTORY: COVID-19 COVID+, wet, persisting cough;   TECHNIQUE: PA and lateral chest radiographs   COMPARISON: Chest x-ray dated 02/08/2022   FINDINGS: The heart is stable in size.  There is no focal airspace consolidation.  There is no pleural effusion or pneumothorax.  There are degenerative changes of the thoracic spine.   No acute abnormality of " the chest.     Electronically signed by: Jazlyn Ramirez   Date:    12/23/2022   Time:    13:00     Plan:         COVID-19  -     XR CHEST PA AND LATERAL    Other orders  -     dexAMETHasone injection 6 mg  -     Discontinue: benzonatate (TESSALON) 200 MG capsule; Take 1 capsule (200 mg total) by mouth 3 (three) times daily as needed.  Dispense: 30 capsule; Refill: 0  -     benzonatate (TESSALON) 200 MG capsule; Take 1 capsule (200 mg total) by mouth 3 (three) times daily as needed for Cough.  Dispense: 30 capsule; Refill: 0  -     doxycycline (MONODOX) 100 MG capsule; Take 1 capsule (100 mg total) by mouth 2 (two) times daily. With food and water. Hold unless symptoms of Pneumonia.  Dispense: 14 capsule; Refill: 0  -     promethazine-dextromethorphan (PROMETHAZINE-DM) 6.25-15 mg/5 mL Syrp; Take 5 mLs by mouth every 6 (six) hours as needed (severe cough; reserve for evening/night use, will cause drowsiness. do not take before driving, working, drinking alcohol.).  Dispense: 100 mL; Refill: 0       Benzonatate/Tessalon Pearles should not cause any dizziness, use for cough.  Promethazine-DM syrup may cause dizziness, sleepiness, use with caution when Veselka will not be alone. If first dose is not well tolerated, do not use again.    Doxycycline (antibiotic): hold this medication unless she exhibits signs of pneumonia (fever, confusion, chills/sweats, upper back pain, green/brown/yellow mucous from her cough). If you have to start this medication, consider bringing her back to Urgent Care or go to ER.     She can also use cough drops, Cepaclor, Raad's vapor rub, a cool mist humidifer.

## 2023-03-15 ENCOUNTER — OFFICE VISIT (OUTPATIENT)
Dept: FAMILY MEDICINE | Facility: CLINIC | Age: 83
End: 2023-03-15
Payer: MEDICAID

## 2023-03-15 VITALS
BODY MASS INDEX: 34.83 KG/M2 | HEIGHT: 64 IN | WEIGHT: 204 LBS | SYSTOLIC BLOOD PRESSURE: 112 MMHG | OXYGEN SATURATION: 97 % | RESPIRATION RATE: 18 BRPM | DIASTOLIC BLOOD PRESSURE: 74 MMHG | TEMPERATURE: 98 F | HEART RATE: 70 BPM

## 2023-03-15 DIAGNOSIS — R49.0 VOICE HOARSENESS: Primary | ICD-10-CM

## 2023-03-15 DIAGNOSIS — E78.5 HYPERLIPIDEMIA, UNSPECIFIED HYPERLIPIDEMIA TYPE: ICD-10-CM

## 2023-03-15 DIAGNOSIS — Z23 REQUIRES A BOOSTER TETANUS: ICD-10-CM

## 2023-03-15 DIAGNOSIS — I25.10 CORONARY ARTERY DISEASE INVOLVING NATIVE CORONARY ARTERY OF NATIVE HEART WITHOUT ANGINA PECTORIS: ICD-10-CM

## 2023-03-15 DIAGNOSIS — I50.20 HEART FAILURE WITH REDUCED EJECTION FRACTION: ICD-10-CM

## 2023-03-15 PROCEDURE — 1159F PR MEDICATION LIST DOCUMENTED IN MEDICAL RECORD: ICD-10-PCS | Mod: CPTII,,, | Performed by: FAMILY MEDICINE

## 2023-03-15 PROCEDURE — 99214 OFFICE O/P EST MOD 30 MIN: CPT | Mod: PBBFAC | Performed by: FAMILY MEDICINE

## 2023-03-15 PROCEDURE — 3288F PR FALLS RISK ASSESSMENT DOCUMENTED: ICD-10-PCS | Mod: CPTII,,, | Performed by: FAMILY MEDICINE

## 2023-03-15 PROCEDURE — 1160F PR REVIEW ALL MEDS BY PRESCRIBER/CLIN PHARMACIST DOCUMENTED: ICD-10-PCS | Mod: CPTII,,, | Performed by: FAMILY MEDICINE

## 2023-03-15 PROCEDURE — 3078F DIAST BP <80 MM HG: CPT | Mod: CPTII,,, | Performed by: FAMILY MEDICINE

## 2023-03-15 PROCEDURE — 1101F PT FALLS ASSESS-DOCD LE1/YR: CPT | Mod: CPTII,,, | Performed by: FAMILY MEDICINE

## 2023-03-15 PROCEDURE — 1160F RVW MEDS BY RX/DR IN RCRD: CPT | Mod: CPTII,,, | Performed by: FAMILY MEDICINE

## 2023-03-15 PROCEDURE — 3078F PR MOST RECENT DIASTOLIC BLOOD PRESSURE < 80 MM HG: ICD-10-PCS | Mod: CPTII,,, | Performed by: FAMILY MEDICINE

## 2023-03-15 PROCEDURE — 3074F SYST BP LT 130 MM HG: CPT | Mod: CPTII,,, | Performed by: FAMILY MEDICINE

## 2023-03-15 PROCEDURE — 3074F PR MOST RECENT SYSTOLIC BLOOD PRESSURE < 130 MM HG: ICD-10-PCS | Mod: CPTII,,, | Performed by: FAMILY MEDICINE

## 2023-03-15 PROCEDURE — 99213 OFFICE O/P EST LOW 20 MIN: CPT | Mod: S$PBB,,, | Performed by: FAMILY MEDICINE

## 2023-03-15 PROCEDURE — 90471 IMMUNIZATION ADMIN: CPT | Mod: PBBFAC

## 2023-03-15 PROCEDURE — 1159F MED LIST DOCD IN RCRD: CPT | Mod: CPTII,,, | Performed by: FAMILY MEDICINE

## 2023-03-15 PROCEDURE — 99213 PR OFFICE/OUTPT VISIT, EST, LEVL III, 20-29 MIN: ICD-10-PCS | Mod: S$PBB,,, | Performed by: FAMILY MEDICINE

## 2023-03-15 PROCEDURE — 1126F AMNT PAIN NOTED NONE PRSNT: CPT | Mod: CPTII,,, | Performed by: FAMILY MEDICINE

## 2023-03-15 PROCEDURE — 90715 TDAP VACCINE 7 YRS/> IM: CPT | Mod: PBBFAC

## 2023-03-15 PROCEDURE — 1101F PR PT FALLS ASSESS DOC 0-1 FALLS W/OUT INJ PAST YR: ICD-10-PCS | Mod: CPTII,,, | Performed by: FAMILY MEDICINE

## 2023-03-15 PROCEDURE — 1126F PR PAIN SEVERITY QUANTIFIED, NO PAIN PRESENT: ICD-10-PCS | Mod: CPTII,,, | Performed by: FAMILY MEDICINE

## 2023-03-15 PROCEDURE — 3288F FALL RISK ASSESSMENT DOCD: CPT | Mod: CPTII,,, | Performed by: FAMILY MEDICINE

## 2023-03-15 RX ADMIN — TETANUS TOXOID, REDUCED DIPHTHERIA TOXOID AND ACELLULAR PERTUSSIS VACCINE, ADSORBED 0.5 ML: 5; 2.5; 8; 8; 2.5 SUSPENSION INTRAMUSCULAR at 08:03

## 2023-03-15 NOTE — PROGRESS NOTES
"Patient Name: Gilmar Vera   : 1940  MRN: [id number]     SUBJECTIVE:  Gilmar Vera is a 82 y.o. female here for Follow-up (Patient is here for a follow up. She states that she is losing her voice on and off this time for about a week. Last year it was approximately 1-2 months, but states she's been having this issue all her life. )  .    HPI  past medical history of CAD (55% stenosis of RCA), Decreased LVEF - NICM (EF 40% - now 50%), Atrial Fibrillation and mild to moderate MR, gait instability using a cane hearing loss here for routine follow-up.  Feels well overall.  No increased shortness of breath.  Patient would like to  discuss having voice hoarseness on and off and losing her voice intermittently.  Last year lasted for about 1-2 months and has had this issue intermittently ongoing "all her life".  Did have COVID in December.  Currently low tone, gets easily irritated and hoarse, almost losing her voice by the end of the day. 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  Denies any problems swallowing or any sore throat.  No unintentional weight loss.  Watching her diet as recommended from Cardiology and feels much better.  Even leg swelling has improved a lot.  Follows with cardiology.  on Aspirin 81 mg, Atorvastatin 40 mg, Losartan 25 mg, Metoprolol Tartrate 25 mg twice daily, and SL Nitro prn CP, Eliquis 5 mg BID.  Compliant.    Patient and daughter report that she had two shots of pneumococcal vaccine in [facility] and is done with the vaccination.  Only due for tetanus shot today.  Declining DEXA scan.  Patient will start to take vitamin-D supplementation .  Already taking multivitamins.        ALLERGIES:   Review of patient's allergies indicates:   Allergen Reactions    Aldactone [spironolactone]     Furosemide Other (See Comments)         ROS:  Review of Systems   Constitutional:  Negative for chills, fever and weight loss.   HENT:  Positive for hearing loss. Negative for congestion, ear " "pain and sore throat.    Eyes:  Negative for blurred vision and pain.   Respiratory:  Negative for cough, shortness of breath and wheezing.    Cardiovascular:  Positive for leg swelling (Improved from baseline). Negative for chest pain, palpitations and PND.   Gastrointestinal:  Negative for abdominal pain, blood in stool, constipation, diarrhea, nausea and vomiting.   Genitourinary:  Negative for dysuria, flank pain and hematuria.   Musculoskeletal:  Negative for falls and myalgias.   Skin:  Negative for rash.   Neurological:  Negative for dizziness, focal weakness and headaches.   Psychiatric/Behavioral:  Negative for depression and substance abuse. The patient is not nervous/anxious.        OBJECTIVE:  Vital signs  Vitals:    03/15/23 0736   BP: 112/74   Pulse: 70   Resp: 18   Temp: 97.7 °F (36.5 °C)   TempSrc: Oral   SpO2: 97%   Weight: 92.5 kg (204 lb)   Height: 5' 4" (1.626 m)      Body mass index is 35.02 kg/m².    PHYSICAL EXAM:   Physical Exam  Vitals reviewed.   Constitutional:       General: She is not in acute distress.     Appearance: Normal appearance. She is not ill-appearing.   HENT:      Head: Normocephalic and atraumatic.      Right Ear: External ear normal.      Left Ear: External ear normal.      Nose: Nose normal. No rhinorrhea.      Mouth/Throat:      Mouth: Mucous membranes are moist.      Pharynx: No oropharyngeal exudate or posterior oropharyngeal erythema.   Eyes:      General: No scleral icterus.        Right eye: No discharge.         Left eye: No discharge.      Conjunctiva/sclera: Conjunctivae normal.      Pupils: Pupils are equal, round, and reactive to light.   Cardiovascular:      Rate and Rhythm: Normal rate. Rhythm irregular.      Heart sounds: Murmur heard.   Pulmonary:      Effort: Pulmonary effort is normal. No respiratory distress.      Breath sounds: No wheezing, rhonchi or rales.   Abdominal:      General: Bowel sounds are normal. There is no distension.      Palpations: " Abdomen is soft.      Tenderness: There is no abdominal tenderness.   Musculoskeletal:         General: No swelling. Normal range of motion.      Cervical back: Normal range of motion and neck supple. No rigidity or tenderness.      Right lower leg: Edema present.      Left lower leg: Edema present.      Comments: Trace edema bilaterally  Using a cane to walk   Skin:     General: Skin is warm.      Coloration: Skin is not pale.      Findings: No rash.   Neurological:      General: No focal deficit present.      Mental Status: She is alert and oriented to person, place, and time.      Sensory: No sensory deficit.      Motor: No weakness.   Psychiatric:         Mood and Affect: Mood normal.         Behavior: Behavior normal.        ASSESSMENT/PLAN:  1. Voice hoarseness    2. Coronary artery disease involving native coronary artery of native heart without angina pectoris    3. Heart failure with reduced ejection fraction    4. Hyperlipidemia, unspecified hyperlipidemia type    5. Requires a booster tetanus  -     Tdap (BOOSTRIX) vaccine injection 0.5 mL       Plan  - regarding voice hoarseness intermittently, recommended patient to call her ENT doctor to schedule appointment and have a possible laryngoscopy to better visualize vocal cords.  She might have vocal cord nodules because of overuse of the voice, with a past profession being a teacher.  Not currently sick that would explain the hoarseness intermittently.  No dysphagia.  -continue to follow with Cardiology.  Patient compliant with her medications.  Labs are being checked regularly from them.  -update preventative healthcare    Previous medical history/lab work/radiology reviewed and considered during medical management decisions.   Medication list reviewed and medication reconciliation performed.  Patient was provided  and care about his/her current diagnosis (es) and medications including risk/benefit and side effects/adverse events, over the counter  medication uses/doses, home self-care and contact precautions,  and red flags and indications for when to seek immediate medical attention.   Patient was advised to continue compliance with current medication list and medical recommendations.  Recommended/ Advised continued compliance with recommended eating habits/ diets for medical conditions and exercise 150 minutes/ week (if possible) for medical condition (s).        RESULTS:  No results found for this or any previous visit (from the past 1008 hour(s)).      Follow Up:  Follow up in about 6 months (around 9/15/2023).         This note was created with the assistance of a voice recognition software or phone dictation. There may be transcription errors as a result of using this technology however minimal. Effort has been made to assure accuracy of transcription but any obvious errors or omissions should be clarified with the author of the document

## 2023-05-12 ENCOUNTER — OFFICE VISIT (OUTPATIENT)
Dept: CARDIOLOGY | Facility: CLINIC | Age: 83
End: 2023-05-12
Payer: MEDICAID

## 2023-05-12 VITALS
BODY MASS INDEX: 36.15 KG/M2 | DIASTOLIC BLOOD PRESSURE: 66 MMHG | HEART RATE: 58 BPM | OXYGEN SATURATION: 96 % | SYSTOLIC BLOOD PRESSURE: 112 MMHG | RESPIRATION RATE: 20 BRPM | WEIGHT: 196.44 LBS | TEMPERATURE: 98 F | HEIGHT: 62 IN

## 2023-05-12 DIAGNOSIS — I50.20 HEART FAILURE WITH REDUCED EJECTION FRACTION: Primary | ICD-10-CM

## 2023-05-12 DIAGNOSIS — I25.10 CORONARY ARTERY DISEASE INVOLVING NATIVE CORONARY ARTERY OF NATIVE HEART WITHOUT ANGINA PECTORIS: ICD-10-CM

## 2023-05-12 DIAGNOSIS — E78.2 MIXED HYPERLIPIDEMIA: ICD-10-CM

## 2023-05-12 DIAGNOSIS — I48.20 CHRONIC ATRIAL FIBRILLATION: ICD-10-CM

## 2023-05-12 PROCEDURE — 3074F PR MOST RECENT SYSTOLIC BLOOD PRESSURE < 130 MM HG: ICD-10-PCS | Mod: CPTII,,, | Performed by: NURSE PRACTITIONER

## 2023-05-12 PROCEDURE — 3078F PR MOST RECENT DIASTOLIC BLOOD PRESSURE < 80 MM HG: ICD-10-PCS | Mod: CPTII,,, | Performed by: NURSE PRACTITIONER

## 2023-05-12 PROCEDURE — 99214 OFFICE O/P EST MOD 30 MIN: CPT | Mod: S$PBB,,, | Performed by: NURSE PRACTITIONER

## 2023-05-12 PROCEDURE — 3078F DIAST BP <80 MM HG: CPT | Mod: CPTII,,, | Performed by: NURSE PRACTITIONER

## 2023-05-12 PROCEDURE — 1159F MED LIST DOCD IN RCRD: CPT | Mod: CPTII,,, | Performed by: NURSE PRACTITIONER

## 2023-05-12 PROCEDURE — 1160F RVW MEDS BY RX/DR IN RCRD: CPT | Mod: CPTII,,, | Performed by: NURSE PRACTITIONER

## 2023-05-12 PROCEDURE — 99214 OFFICE O/P EST MOD 30 MIN: CPT | Mod: PBBFAC | Performed by: NURSE PRACTITIONER

## 2023-05-12 PROCEDURE — 1159F PR MEDICATION LIST DOCUMENTED IN MEDICAL RECORD: ICD-10-PCS | Mod: CPTII,,, | Performed by: NURSE PRACTITIONER

## 2023-05-12 PROCEDURE — 1160F PR REVIEW ALL MEDS BY PRESCRIBER/CLIN PHARMACIST DOCUMENTED: ICD-10-PCS | Mod: CPTII,,, | Performed by: NURSE PRACTITIONER

## 2023-05-12 PROCEDURE — 99214 PR OFFICE/OUTPT VISIT, EST, LEVL IV, 30-39 MIN: ICD-10-PCS | Mod: S$PBB,,, | Performed by: NURSE PRACTITIONER

## 2023-05-12 PROCEDURE — 3074F SYST BP LT 130 MM HG: CPT | Mod: CPTII,,, | Performed by: NURSE PRACTITIONER

## 2023-05-12 NOTE — PROGRESS NOTES
CHIEF COMPLAINT:   Chief Complaint   Patient presents with    F/U 6 month visit     Denies any cardiac problems                   Review of patient's allergies indicates:   Allergen Reactions    Aldactone [spironolactone]     Furosemide Other (See Comments)                                          HPI:  Gilmar Vera 82 y.o. female with a past medical history of CAD (55% stenosis of RCA), NICM (recovered) (EF 40% - now 55%), Atrial Fibrillation (on Eliquis) and MR who presents for routine follow up and ongoing care.  She completed a Lexiscan Stress Test on 6.25.20  which showed lateral-apical ischemia with no scar, EF of 90% with no wall motion abnormality. At a previous appointment in June 2020, it was decided to medically manage as patient was asymptomatic at the time. The plan was to proceed with coronary angiogram if patient began to have symptoms. When patient followed up in clinic in Dec. 2020, she reported symptoms of chest pain and increased fatigue. She was scheduled to undergo coronary angiogram procedure at that time, but asked to reschedule procedure until after she received her Covid 19 vaccine. At her clinic visit in March 2021, she stated she would like to continue to hold off on coronary angiogram procedure as she stated she was actually feeling better.    Today the patient reports that she lost approximately 20 lbs following a low carb diet and feels good.  She denies any cardiac complaints of chest pain, shortness of breath, palpitations, orthopnea, PND or syncope.  She also endorses significant improvement in her bilateral lower extremity, stating that she only takes her diuretic p.r.n..  She states she is able to perform her normal ADLs, at her pace, without experiencing any ischemic symptoms.  She reports compliance with her current medications.    Patient's daughter is translating during clinic visit.     CARDIAC TESTING:  Echocardiogram 9.30.22:  The left ventricle is normal in size with  eccentric hypertrophy and low normal systolic function.  The estimated ejection fraction is 50%.  Indeterminate left ventricular diastolic function.  Mild right ventricular enlargement with low normal right ventricular systolic function.  Moderate right atrial enlargement.  Mild to moderate pulmonic regurgitation.  Mild-to-moderate mitral regurgitation.  Mild to moderate tricuspid regurgitation.  Mild left atrial enlargement.  There is mild pulmonary hypertension.    Lexiscan Stress Test 2020:   Lexiscan stress test 2020:  Scan is consistent with: Lateral-apical ischemia  No scar  Ejection fraction: 90%  No wall motion abnormality    Echocardiogram (2019): EF of 50 to 55% with mild MR, mild TR and RVSP of 26 mmHg    [facility] (2017): Dominant RCA with moderate atherosclerotic plaque and 55% eccentric stenosis proximal mid vessel, LAD with mild atherosclerotic plaque and 30% distal stenosis, 3-4+ MR, Normal LVEF 60%                                                                                                                                                                                                                                               Patient Active Problem List   Diagnosis    Heart failure with reduced ejection fraction    Chronic atrial fibrillation    Coronary artery disease involving native coronary artery of native heart without angina pectoris    RHOADES (dyspnea on exertion)    Peripheral edema    Cardiac conduction disorder    Hyperlipidemia     Past Surgical History:   Procedure Laterality Date    APPENDECTOMY      HIP REPLACEMENT ARTHROPLASTY      HIP SURGERY  7 years ago    JOINT REPLACEMENT  2016    tolsillectomy       Social History     Socioeconomic History    Marital status:    Tobacco Use    Smoking status: Former     Packs/day: 0.50     Years: 15.00     Pack years: 7.50     Types: Cigarettes     Start date: 1968     Quit date: 2010     Years since quittin.3     "Smokeless tobacco: Never    Tobacco comments:     I stop smoking more than 10 years ago   Substance and Sexual Activity    Alcohol use: Not Currently    Drug use: Not Currently    Sexual activity: Not Currently        Family History   Problem Relation Age of Onset    Stroke Mother     Hypertension Mother     Kidney failure Father          Current Outpatient Medications:     aspirin (ECOTRIN) 81 MG EC tablet, Take 81 mg by mouth once daily., Disp: , Rfl:     atorvastatin (LIPITOR) 40 MG tablet, Take 1 tablet (40 mg total) by mouth once daily., Disp: 90 tablet, Rfl: 3    ELIQUIS 5 mg Tab, Take 1 tablet (5 mg total) by mouth 2 (two) times daily., Disp: 180 tablet, Rfl: 3    furosemide (LASIX) 20 MG tablet, Take 1 tablet (20 mg total) by mouth daily as needed., Disp: 180 tablet, Rfl: 3    losartan (COZAAR) 25 MG tablet, Take 1 tablet (25 mg total) by mouth once daily., Disp: 90 tablet, Rfl: 3    metoprolol tartrate (LOPRESSOR) 25 MG tablet, Take 1 tablet (25 mg total) by mouth 2 (two) times daily., Disp: 180 tablet, Rfl: 3    nitroGLYCERIN (NITROSTAT) 0.4 MG SL tablet, DISSOLVE 1 TABLET UNDER THE TONGUE EVERY 5 MINUTES AS NEEDED FOR CHEST PAIN UP TO 3 DOSES IN 15 MINUTES. IF NO RELIEF, GO TO ER, Disp: , Rfl:     polyethylene glycol (GLYCOLAX) 17 gram PwPk, Take 17 g by mouth once daily. (Patient not taking: Reported on 5/12/2023), Disp: 473 each, Rfl: 5     ROS:                                                                                                                                                                             Review of Systems   Respiratory:  Positive for shortness of breath.    Cardiovascular:  Positive for leg swelling.   Gastrointestinal: Negative.    Skin: Negative.    Neurological: Negative.    Psychiatric/Behavioral: Negative.        Blood pressure 112/66, pulse (!) 58, temperature 97.6 °F (36.4 °C), temperature source Oral, resp. rate 20, height 5' 2" (1.575 m), weight 89.1 kg (196 lb 6.9 " oz), SpO2 96 %.   PE:  Physical Exam  Constitutional:       Appearance: Normal appearance.   HENT:      Head: Normocephalic and atraumatic.   Eyes:      Extraocular Movements: Extraocular movements intact.      Pupils: Pupils are equal, round, and reactive to light.   Cardiovascular:      Rate and Rhythm: Normal rate. Rhythm irregular.   Pulmonary:      Effort: Pulmonary effort is normal.      Breath sounds: Normal breath sounds.   Abdominal:      Palpations: Abdomen is soft.   Musculoskeletal:         General: Normal range of motion.      Cervical back: Normal range of motion.      Right lower leg: Edema present.      Left lower leg: Edema present.   Skin:     General: Skin is warm and dry.   Neurological:      General: No focal deficit present.      Mental Status: She is alert and oriented to person, place, and time.   Psychiatric:         Mood and Affect: Mood normal.         Behavior: Behavior normal.     ASSESSMENT/PLAN:  Abnormal Stress Test   - Lexiscan Stress Test - lateral-apical ischemia with no scar,EF of 90% with no wall motion abnormality (6.5.20)  - Denies any cardiac complaints of CP or SOB  - Patient ultimately decided to continue with medical management. (See HPI).  Instructed patient and patient's daughter to notify clinic, if she develops any anginal symptoms, may consider invasive evaluation at that time.    - Continue ASA, statin, losartan, metoprolol  and SL nitro prn   - Strict ED precautions provided     CAD  - [facility] (2017) showed dominant RCA with moderate atherosclerotic plaque and 55% stenosis in proximal and mid vessel, LAD with mid atherosclerotic plaque and 30% distal stenosis, 3-4+ MR, LVEF 60%  - Lexiscan - positive for lateral-apical ischemia with no scar, EF of 90% with no wall motion abnormality (6.25.20)  - Denies chest pain or SOB  - Continue Aspirin 81 mg, Atorvastatin 40 mg, Losartan 25 mg, Metoprolol Tartrate 25 mg twice daily, and SL Nitro prn CP  - Counseled on heart healthy  diet and increased activity as tolerated      HFrEF/NICM - recovered EF 50% Sept. 2022 - [facility] Class [facility]  - Echo from 12/2019 showed EF of 50 to 55% with mild MR, mild TR and RVSP of 26 mmHg (improved from EF 40%)  - Denies SOB.    - Euvolemic upon exam, warm and dry  - Continue GDMT with Losartan and Metoprolol Tartrate  - Counseled on low salt diet      Atrial Fibrillation  - Denies palpitations  - Continue Metoprolol Tartrate 25 mg twice daily  - Continued on Eliquis 5 mg twice daily. Denies any adverse bleeding issues       Follow up in Cardiology Clinic in 6 months or sooner if needed  Follow up with PCP as directed  Strict ED precautions

## 2023-05-12 NOTE — PATIENT INSTRUCTIONS
Follow up in Cardiology Clinic in 6 months or sooner if needed  Follow up with PCP as directed  Strict ED precautions

## 2023-09-15 ENCOUNTER — OFFICE VISIT (OUTPATIENT)
Dept: FAMILY MEDICINE | Facility: CLINIC | Age: 83
End: 2023-09-15
Payer: MEDICAID

## 2023-09-15 VITALS
BODY MASS INDEX: 36.07 KG/M2 | HEART RATE: 64 BPM | DIASTOLIC BLOOD PRESSURE: 60 MMHG | TEMPERATURE: 98 F | WEIGHT: 196 LBS | HEIGHT: 62 IN | SYSTOLIC BLOOD PRESSURE: 98 MMHG

## 2023-09-15 DIAGNOSIS — Z23 NEEDS FLU SHOT: ICD-10-CM

## 2023-09-15 DIAGNOSIS — M25.512 CHRONIC PAIN OF BOTH SHOULDERS: ICD-10-CM

## 2023-09-15 DIAGNOSIS — K59.09 CHRONIC CONSTIPATION: Primary | ICD-10-CM

## 2023-09-15 DIAGNOSIS — M25.511 CHRONIC PAIN OF BOTH SHOULDERS: ICD-10-CM

## 2023-09-15 DIAGNOSIS — G89.29 CHRONIC PAIN OF BOTH SHOULDERS: ICD-10-CM

## 2023-09-15 PROCEDURE — 3078F DIAST BP <80 MM HG: CPT | Mod: CPTII,,, | Performed by: FAMILY MEDICINE

## 2023-09-15 PROCEDURE — 99213 OFFICE O/P EST LOW 20 MIN: CPT | Mod: PBBFAC | Performed by: FAMILY MEDICINE

## 2023-09-15 PROCEDURE — 3074F PR MOST RECENT SYSTOLIC BLOOD PRESSURE < 130 MM HG: ICD-10-PCS | Mod: CPTII,,, | Performed by: FAMILY MEDICINE

## 2023-09-15 PROCEDURE — 1159F MED LIST DOCD IN RCRD: CPT | Mod: CPTII,,, | Performed by: FAMILY MEDICINE

## 2023-09-15 PROCEDURE — 3074F SYST BP LT 130 MM HG: CPT | Mod: CPTII,,, | Performed by: FAMILY MEDICINE

## 2023-09-15 PROCEDURE — 99214 PR OFFICE/OUTPT VISIT, EST, LEVL IV, 30-39 MIN: ICD-10-PCS | Mod: S$PBB,,, | Performed by: FAMILY MEDICINE

## 2023-09-15 PROCEDURE — 1160F RVW MEDS BY RX/DR IN RCRD: CPT | Mod: CPTII,,, | Performed by: FAMILY MEDICINE

## 2023-09-15 PROCEDURE — 99214 OFFICE O/P EST MOD 30 MIN: CPT | Mod: S$PBB,,, | Performed by: FAMILY MEDICINE

## 2023-09-15 PROCEDURE — 3078F PR MOST RECENT DIASTOLIC BLOOD PRESSURE < 80 MM HG: ICD-10-PCS | Mod: CPTII,,, | Performed by: FAMILY MEDICINE

## 2023-09-15 PROCEDURE — 90694 VACC AIIV4 NO PRSRV 0.5ML IM: CPT | Mod: PBBFAC

## 2023-09-15 PROCEDURE — 3288F PR FALLS RISK ASSESSMENT DOCUMENTED: ICD-10-PCS | Mod: CPTII,,, | Performed by: FAMILY MEDICINE

## 2023-09-15 PROCEDURE — 3288F FALL RISK ASSESSMENT DOCD: CPT | Mod: CPTII,,, | Performed by: FAMILY MEDICINE

## 2023-09-15 PROCEDURE — 1159F PR MEDICATION LIST DOCUMENTED IN MEDICAL RECORD: ICD-10-PCS | Mod: CPTII,,, | Performed by: FAMILY MEDICINE

## 2023-09-15 PROCEDURE — 90471 IMMUNIZATION ADMIN: CPT | Mod: PBBFAC

## 2023-09-15 PROCEDURE — 1101F PT FALLS ASSESS-DOCD LE1/YR: CPT | Mod: CPTII,,, | Performed by: FAMILY MEDICINE

## 2023-09-15 PROCEDURE — 1101F PR PT FALLS ASSESS DOC 0-1 FALLS W/OUT INJ PAST YR: ICD-10-PCS | Mod: CPTII,,, | Performed by: FAMILY MEDICINE

## 2023-09-15 PROCEDURE — 1160F PR REVIEW ALL MEDS BY PRESCRIBER/CLIN PHARMACIST DOCUMENTED: ICD-10-PCS | Mod: CPTII,,, | Performed by: FAMILY MEDICINE

## 2023-09-15 RX ORDER — POLYETHYLENE GLYCOL 3350 17 G/17G
17 POWDER, FOR SOLUTION ORAL DAILY
Qty: 100 EACH | Refills: 1 | Status: SHIPPED | OUTPATIENT
Start: 2023-09-15

## 2023-09-15 RX ORDER — DICLOFENAC SODIUM 10 MG/G
2 GEL TOPICAL DAILY
Qty: 400 G | Refills: 1 | Status: SHIPPED | OUTPATIENT
Start: 2023-09-15

## 2023-09-15 RX ADMIN — INFLUENZA A VIRUS A/VICTORIA/4897/2022 IVR-238 (H1N1) ANTIGEN (FORMALDEHYDE INACTIVATED), INFLUENZA A VIRUS A/DARWIN/6/2021 IVR-227 (H3N2) ANTIGEN (FORMALDEHYDE INACTIVATED), INFLUENZA B VIRUS B/AUSTRIA/1359417/2021 BVR-26 ANTIGEN (FORMALDEHYDE INACTIVATED), INFLUENZA B VIRUS B/PHUKET/3073/2013 BVR-1B ANTIGEN (FORMALDEHYDE INACTIVATED) 0.5 ML: 15; 15; 15; 15 INJECTION, SUSPENSION INTRAMUSCULAR at 09:09

## 2023-09-15 NOTE — PROGRESS NOTES
Patient Name: Gilmar Vera   : 1940  MRN: [id number]     SUBJECTIVE:  Gilmar Vera is a 82 y.o. female here for Follow-up (The patient has consistent constipation issue and would like to discuss how to take the glycolax. )  .    HPI  Here for routine follow-up. Accompanied by her daughter who intermittently translates for her.   Saw cardiology about 4 months ago regarding history of CAD, heart failure, AFib and MR.  No changes with regimen.  Patient reports compliance with losartan, metoprolol baby aspirin, atorvastatin, Eliquis.  Denies any chest pain, shortness of breath, palpitations.  Takes Lasix as needed and has not had any leg swelling lately.  Has labs pending from Cardiology that she is seeing in 2 months.    Regarding chronic constipation, on glycolax qd. Does work but wondering if she can continue taking it. When taking it,,has a bm that day, normal consistency. No blood in stools or any black stools. No abdominal pain either.    Shoulder pain/stiffness bilaterally when waking up in the morning, but then gets better during the day, almost not feeling any discomfort at all. Ongoing intermittently for the past 2 months. Has tried creams OTC and electropatches for massages.  Dexa scan-refused. Taking vit d        ALLERGIES:   Review of patient's allergies indicates:   Allergen Reactions    Aldactone [spironolactone]     Furosemide Other (See Comments)         ROS:  Review of Systems   Constitutional:  Negative for chills and fever.   HENT:  Negative for congestion.    Eyes:  Negative for blurred vision.   Respiratory:  Negative for cough and shortness of breath.    Cardiovascular:  Negative for chest pain and leg swelling.   Gastrointestinal:  Positive for constipation. Negative for abdominal pain, blood in stool, melena, nausea and vomiting.   Genitourinary:  Negative for dysuria and hematuria.   Musculoskeletal:  Positive for joint pain.   Neurological:  Negative for dizziness and headaches.  "  Psychiatric/Behavioral:  Negative for depression. The patient is not nervous/anxious.          OBJECTIVE:  Vital signs  Vitals:    09/15/23 0826   BP: 98/60   Pulse: 64   Temp: 97.5 °F (36.4 °C)   TempSrc: Oral   Weight: 88.9 kg (196 lb)   Height: 5' 2" (1.575 m)      Body mass index is 35.85 kg/m².    PHYSICAL EXAM:   Physical Exam  Vitals reviewed.   Constitutional:       General: She is not in acute distress.     Appearance: Normal appearance. She is obese. She is not ill-appearing.   HENT:      Head: Normocephalic and atraumatic.      Right Ear: External ear normal.      Left Ear: External ear normal.      Mouth/Throat:      Mouth: Mucous membranes are moist.   Eyes:      General: No scleral icterus.        Right eye: No discharge.         Left eye: No discharge.      Conjunctiva/sclera: Conjunctivae normal.      Pupils: Pupils are equal, round, and reactive to light.   Cardiovascular:      Rate and Rhythm: Normal rate and regular rhythm.   Pulmonary:      Effort: Pulmonary effort is normal. No respiratory distress.      Breath sounds: No wheezing, rhonchi or rales.   Abdominal:      General: Bowel sounds are normal. There is no distension.      Palpations: Abdomen is soft.      Tenderness: There is no abdominal tenderness.   Musculoskeletal:      Cervical back: Normal range of motion and neck supple. No rigidity or tenderness.      Right lower leg: No edema.      Left lower leg: No edema.      Comments: Uses a cane. No pain currently. Usually when waking up having some shoulder stiffness that gets better during the day   Skin:     General: Skin is warm.      Findings: No rash.   Neurological:      General: No focal deficit present.      Mental Status: She is alert and oriented to person, place, and time.   Psychiatric:         Mood and Affect: Mood normal.         Behavior: Behavior normal.          ASSESSMENT/PLAN:  1. Chronic constipation  -     polyethylene glycol (GLYCOLAX) 17 gram PwPk; Take 17 g by " mouth once daily.  Dispense: 100 each; Refill: 1    2. Chronic pain of both shoulders  -     diclofenac sodium (VOLTAREN) 1 % Gel; Apply 2 g topically once daily.  Dispense: 400 g; Refill: 1    3. Needs flu shot  -     influenza 65up-adj (QUADRIVALENT ADJUVANTED PF) vaccine 0.5 mL       PLAN  - constipation well-controlled on Glycolax daily/as needed.  Discussed as per HPI to use it daily or as needed and having good effect with it.  Advised to keep herself well hydrated and incorporate fibers/vegetables in her diet as much as possible.  -regarding the chronic pain of both shoulders, likely due to arthritic changes.  Limited with options, trying to avoid NSAIDs to not exacerbate any stomach issues and being on blood thinners.  Advised patient to start taking Tylenol as needed and will try diclofenac gel.  -flu shot given.  Continue to follow up with specialists.      Previous medical history/lab work/radiology reviewed and considered during medical management decisions.   Medication list reviewed and medication reconciliation performed.  Patient was provided  and care about his/her current diagnosis (es) and medications including risk/benefit and side effects/adverse events, over the counter medication uses/doses, home self-care and contact precautions,  and red flags and indications for when to seek immediate medical attention.   Patient was advised to continue compliance with current medication list and medical recommendations.  Recommended/ Advised continued compliance with recommended eating habits/ diets for medical conditions and exercise 150 minutes/ week (if possible) for medical condition (s).        RESULTS:  No results found for this or any previous visit (from the past 1008 hour(s)).      Follow Up:  Follow up in about 6 months (around 3/15/2024).     [unfilled]    This note was created with the assistance of a voice recognition software or phone dictation. There may be  transcription errors as a result of using this technology however minimal. Effort has been made to assure accuracy of transcription but any obvious errors or omissions should be clarified with the author of the document

## 2023-09-26 ENCOUNTER — LAB VISIT (OUTPATIENT)
Dept: LAB | Facility: HOSPITAL | Age: 83
End: 2023-09-26
Attending: NURSE PRACTITIONER
Payer: MEDICAID

## 2023-09-26 DIAGNOSIS — I25.10 CORONARY ARTERY DISEASE INVOLVING NATIVE CORONARY ARTERY OF NATIVE HEART WITHOUT ANGINA PECTORIS: ICD-10-CM

## 2023-09-26 LAB
ALBUMIN SERPL-MCNC: 3.9 G/DL (ref 3.4–4.8)
ALBUMIN/GLOB SERPL: 1.2 RATIO (ref 1.1–2)
ALP SERPL-CCNC: 72 UNIT/L (ref 40–150)
ALT SERPL-CCNC: 14 UNIT/L (ref 0–55)
AST SERPL-CCNC: 36 UNIT/L (ref 5–34)
BILIRUB SERPL-MCNC: 1.3 MG/DL
BUN SERPL-MCNC: 5.2 MG/DL (ref 9.8–20.1)
CALCIUM SERPL-MCNC: 10.7 MG/DL (ref 8.4–10.2)
CHLORIDE SERPL-SCNC: 107 MMOL/L (ref 98–107)
CHOLEST SERPL-MCNC: 85 MG/DL
CHOLEST/HDLC SERPL: 2 {RATIO} (ref 0–5)
CO2 SERPL-SCNC: 29 MMOL/L (ref 23–31)
CREAT SERPL-MCNC: 0.79 MG/DL (ref 0.55–1.02)
GFR SERPLBLD CREATININE-BSD FMLA CKD-EPI: >60 MLS/MIN/1.73/M2
GLOBULIN SER-MCNC: 3.3 GM/DL (ref 2.4–3.5)
GLUCOSE SERPL-MCNC: 106 MG/DL (ref 82–115)
HDLC SERPL-MCNC: 37 MG/DL (ref 35–60)
LDLC SERPL CALC-MCNC: 36 MG/DL (ref 50–140)
POTASSIUM SERPL-SCNC: 4.4 MMOL/L (ref 3.5–5.1)
PROT SERPL-MCNC: 7.2 GM/DL (ref 5.8–7.6)
SODIUM SERPL-SCNC: 141 MMOL/L (ref 136–145)
TRIGL SERPL-MCNC: 62 MG/DL (ref 37–140)
VLDLC SERPL CALC-MCNC: 12 MG/DL

## 2023-09-26 PROCEDURE — 80053 COMPREHEN METABOLIC PANEL: CPT

## 2023-09-26 PROCEDURE — 80061 LIPID PANEL: CPT

## 2023-09-26 PROCEDURE — 36415 COLL VENOUS BLD VENIPUNCTURE: CPT

## 2023-11-01 DIAGNOSIS — I25.10 CORONARY ARTERY DISEASE INVOLVING NATIVE CORONARY ARTERY OF NATIVE HEART WITHOUT ANGINA PECTORIS: ICD-10-CM

## 2023-11-01 DIAGNOSIS — I50.20 HEART FAILURE WITH REDUCED EJECTION FRACTION: ICD-10-CM

## 2023-11-01 DIAGNOSIS — I48.20 CHRONIC ATRIAL FIBRILLATION: ICD-10-CM

## 2023-11-01 DIAGNOSIS — R60.0 PERIPHERAL EDEMA: ICD-10-CM

## 2023-11-01 RX ORDER — FUROSEMIDE 20 MG/1
20 TABLET ORAL DAILY PRN
Qty: 90 TABLET | Refills: 3 | Status: SHIPPED | OUTPATIENT
Start: 2023-11-01 | End: 2025-10-21

## 2023-11-01 RX ORDER — METOPROLOL TARTRATE 25 MG/1
25 TABLET, FILM COATED ORAL 2 TIMES DAILY
Qty: 180 TABLET | Refills: 3 | Status: SHIPPED | OUTPATIENT
Start: 2023-11-01 | End: 2024-04-02 | Stop reason: SDUPTHER

## 2023-11-01 RX ORDER — ATORVASTATIN CALCIUM 40 MG/1
40 TABLET, FILM COATED ORAL DAILY
Qty: 90 TABLET | Refills: 3 | Status: SHIPPED | OUTPATIENT
Start: 2023-11-01 | End: 2023-11-17 | Stop reason: SDUPTHER

## 2023-11-01 RX ORDER — APIXABAN 5 MG/1
5 TABLET, FILM COATED ORAL 2 TIMES DAILY
Qty: 180 TABLET | Refills: 3 | Status: SHIPPED | OUTPATIENT
Start: 2023-11-01 | End: 2024-10-31

## 2023-11-01 RX ORDER — LOSARTAN POTASSIUM 25 MG/1
25 TABLET ORAL DAILY
Qty: 90 TABLET | Refills: 3 | Status: SHIPPED | OUTPATIENT
Start: 2023-11-01 | End: 2024-03-18 | Stop reason: SDUPTHER

## 2023-11-16 NOTE — PROGRESS NOTES
CHIEF COMPLAINT:   Chief Complaint   Patient presents with    F/U 6 month visit     Denies any cardiac problems                   Review of patient's allergies indicates:   Allergen Reactions    Aldactone [spironolactone]     Furosemide Other (See Comments)                                          HPI:  Gilmar Vera 82 y.o. female with a past medical history of CAD, nonobstructive (55% stenosis of RCA), NICM (recovered) (EF 40% - now 50% per ECHO 9.30.22), Chronic Atrial Fibrillation (on Eliquis) and MR who presents for routine follow up and ongoing care. Patient had an abnormal Lexiscan Stress Test in June 2020 which showed lateral-apical ischemia with no scar, EF of 90% with no wall motion abnormality that is being medically managed.  The patient underwent a previous left heart catheterization in 2017 that revealed nonobstructive CAD.  Latest echocardiogram obtained on 9.30.22 revealed a preserved ejection fraction of 50%, mild-to-moderate CO, mild-to-moderate MR and mild-to-moderate TR.     Patient presents to clinic today denying any acute cardiac complaints of chest pain, shortness of breath, palpitations, orthopnea, PND, claudication, lightheadedness, dizziness, near-syncope or syncope. She is able to perform her normal ADLs and regular activities without experiencing any cardiac symptoms.  Patient continues to follow her dietary modifications and reports that she feels good overall.  She notes significant improvement in her bilateral lower extremity edema, stating that she only takes her diuretic once or twice a week prn.  She endorses compliance with her current medications and denies any adverse bleeding issues with Eliquis.     Patient's daughter is translating during clinic visit.     CARDIAC TESTING:  Echocardiogram 9.30.22:  The left ventricle is normal in size with eccentric hypertrophy and low normal systolic function.  The estimated ejection fraction is 50%.  Indeterminate left ventricular diastolic  function.  Mild right ventricular enlargement with low normal right ventricular systolic function.  Moderate right atrial enlargement.  Mild to moderate pulmonic regurgitation.  Mild-to-moderate mitral regurgitation.  Mild to moderate tricuspid regurgitation.  Mild left atrial enlargement.  There is mild pulmonary hypertension.    Lexiscan Stress Test 2020:   Lexiscan stress test 2020:  Scan is consistent with: Lateral-apical ischemia  No scar  Ejection fraction: 90%  No wall motion abnormality    Echocardiogram (2019): EF of 50 to 55% with mild MR, mild TR and RVSP of 26 mmHg    LHC (2017): Dominant RCA with moderate atherosclerotic plaque and 55% eccentric stenosis proximal mid vessel, LAD with mild atherosclerotic plaque and 30% distal stenosis, 3-4+ MR, Normal LVEF 60%                                                                                                                                                                                                                                               Patient Active Problem List   Diagnosis    Heart failure with reduced ejection fraction    Chronic atrial fibrillation    Coronary artery disease involving native coronary artery of native heart without angina pectoris    RHOADES (dyspnea on exertion)    Peripheral edema    Cardiac conduction disorder    Hyperlipidemia     Past Surgical History:   Procedure Laterality Date    APPENDECTOMY      HIP REPLACEMENT ARTHROPLASTY      HIP SURGERY  7 years ago    JOINT REPLACEMENT  2016    tolsillectomy       Social History     Socioeconomic History    Marital status:    Tobacco Use    Smoking status: Former     Current packs/day: 0.00     Average packs/day: 0.5 packs/day for 42.0 years (21.0 ttl pk-yrs)     Types: Cigarettes     Start date: 1968     Quit date: 2010     Years since quittin.8    Smokeless tobacco: Never    Tobacco comments:     I stop smoking more than 10 years ago   Substance and  "Sexual Activity    Alcohol use: Not Currently    Drug use: Not Currently    Sexual activity: Not Currently        Family History   Problem Relation Age of Onset    Stroke Mother     Hypertension Mother     Kidney failure Father          Current Outpatient Medications:     aspirin (ECOTRIN) 81 MG EC tablet, Take 81 mg by mouth once daily., Disp: , Rfl:     atorvastatin (LIPITOR) 40 MG tablet, Take 1 tablet (40 mg total) by mouth once daily., Disp: 90 tablet, Rfl: 3    diclofenac sodium (VOLTAREN) 1 % Gel, Apply 2 g topically once daily., Disp: 400 g, Rfl: 1    ELIQUIS 5 mg Tab, Take 1 tablet (5 mg total) by mouth 2 (two) times daily., Disp: 180 tablet, Rfl: 3    furosemide (LASIX) 20 MG tablet, Take 1 tablet (20 mg total) by mouth daily as needed., Disp: 90 tablet, Rfl: 3    losartan (COZAAR) 25 MG tablet, Take 1 tablet (25 mg total) by mouth once daily., Disp: 90 tablet, Rfl: 3    metoprolol tartrate (LOPRESSOR) 25 MG tablet, Take 1 tablet (25 mg total) by mouth 2 (two) times daily., Disp: 180 tablet, Rfl: 3    nitroGLYCERIN (NITROSTAT) 0.4 MG SL tablet, DISSOLVE 1 TABLET UNDER THE TONGUE EVERY 5 MINUTES AS NEEDED FOR CHEST PAIN UP TO 3 DOSES IN 15 MINUTES. IF NO RELIEF, GO TO ER, Disp: , Rfl:     polyethylene glycol (GLYCOLAX) 17 gram PwPk, Take 17 g by mouth once daily., Disp: 100 each, Rfl: 1     ROS:                                                                                                                                                                             Review of Systems   Respiratory:  Positive for shortness of breath.    Cardiovascular:  Positive for leg swelling.   Gastrointestinal: Negative.    Skin: Negative.    Neurological: Negative.    Psychiatric/Behavioral: Negative.          Blood pressure (!) 92/56, pulse 62, temperature 97.6 °F (36.4 °C), temperature source Oral, resp. rate 18, height 5' 2" (1.575 m), weight 84.3 kg (185 lb 13.6 oz), SpO2 99 %.   PE:  Physical Exam  Constitutional:  "      Appearance: Normal appearance.   HENT:      Head: Normocephalic and atraumatic.   Eyes:      Extraocular Movements: Extraocular movements intact.      Pupils: Pupils are equal, round, and reactive to light.   Cardiovascular:      Rate and Rhythm: Normal rate. Rhythm irregular.   Pulmonary:      Effort: Pulmonary effort is normal.      Breath sounds: Normal breath sounds.   Abdominal:      Palpations: Abdomen is soft.   Musculoskeletal:         General: Normal range of motion.      Cervical back: Normal range of motion.      Right lower leg: Edema present.      Left lower leg: Edema present.   Skin:     General: Skin is warm and dry.   Neurological:      General: No focal deficit present.      Mental Status: She is alert and oriented to person, place, and time.   Psychiatric:         Mood and Affect: Mood normal.         Behavior: Behavior normal.       ASSESSMENT/PLAN:  Abnormal Stress Test   - Lexiscan Stress Test - lateral-apical ischemia with no scar,EF of 90% with no wall motion abnormality (6.5.20)  - Denies any cardiac complaints   - Continue medical management ASA, statin, losartan, metoprolol  and SL nitro prn   - Instructed patient and patient's daughter to notify clinic if she develops any anginal/anginal equivalent symptoms.  If symptoms persist despite optimization of medications can consider further evaluation at that time, may need to repeat stress test at this point or proceed with invasive evaluation pending symptoms   - Strict ED precautions provided     CAD, nonobstructive per [facility] 2017  - [facility] - Dominant RCA with moderate atherosclerotic plaque and 55% stenosis in proximal and mid vessel, LAD with mid atherosclerotic plaque and 30% distal stenosis, 3-4+ MR, LVEF 60% (2017)  - Lexiscan - positive for lateral-apical ischemia with no scar, EF of 90% with no wall motion abnormality (6.25.20)  - Denies any cardiac complaints   - Continue Aspirin 81 mg, Atorvastatin 40 mg, Losartan 25 mg, Metoprolol  Tartrate 25 mg twice daily, and SL Nitro prn CP  - Counseled on heart healthy diet and increased activity as tolerated    HFrecEF/NICM  - [facility] Class II  - Recovered EF 50% Sept. 2022-->EF-50-55% per ECHO Dec. 2019-->EF-40%  - Echo from 12/2019 showed EF of 50 to 55% with mild MR, mild TR and RVSP of 26 mmHg (improved from EF 40%)  - Denies SOB or RHOADES   - Euvolemic upon exam, warm and dry  - Continue GDMT: Losartan 25 mg and Metoprolol Tartrate 25 mg BID (EF has recovered, will continue with tartrate for now, if becomes reduced again, will need to switch to succinate at that time)  - Continue Lasix as directed  - Counseled on low salt diet    Atrial Fibrillation  - Denies palpitations  - Continue Metoprolol Tartrate 25 mg twice daily  - Continued on Eliquis 5 mg twice daily. Denies any adverse bleeding issues   - EKG today     HLD  - LDL-36, total chol-85  - Decrease Atorvastatin to 20 mg daily   - Repeat labs prior to next clinic visit     Hypotension  - Hypotensive today 92/56. Patient asymptomatic   - Continue with current medications for now, losartan 25 mg metoprolol tartrate 25 mg b.i.d. and Lasix 20 mg prn  - Instructed patient to monitor and log blood pressure and call with readings in 2 weeks   - Hypotension precautions provided    VHD- Asymptomatic  - EF- 50%, mild to Moderate IA, mild to moderate MR and mild to moderate TR per ECHO 9.30.22  - Will continue to monitor with serial Echocardiograms. Will repeat in 2024      EKG  Decrease Atorvastatin to 20 mg qd   Nurse visit in 2 weeks for BP check (call)  Follow up in Cardiology Clinic in 4 months with FLP or sooner if needed  Follow up with PCP as directed  Strict ED precautions

## 2023-11-17 ENCOUNTER — OFFICE VISIT (OUTPATIENT)
Dept: CARDIOLOGY | Facility: CLINIC | Age: 83
End: 2023-11-17
Payer: MEDICAID

## 2023-11-17 VITALS
HEIGHT: 62 IN | TEMPERATURE: 98 F | HEART RATE: 62 BPM | DIASTOLIC BLOOD PRESSURE: 56 MMHG | WEIGHT: 185.88 LBS | SYSTOLIC BLOOD PRESSURE: 92 MMHG | BODY MASS INDEX: 34.2 KG/M2 | OXYGEN SATURATION: 99 % | RESPIRATION RATE: 18 BRPM

## 2023-11-17 DIAGNOSIS — E78.2 MIXED HYPERLIPIDEMIA: ICD-10-CM

## 2023-11-17 DIAGNOSIS — I25.10 CORONARY ARTERY DISEASE INVOLVING NATIVE CORONARY ARTERY OF NATIVE HEART WITHOUT ANGINA PECTORIS: ICD-10-CM

## 2023-11-17 DIAGNOSIS — I48.20 CHRONIC ATRIAL FIBRILLATION: ICD-10-CM

## 2023-11-17 DIAGNOSIS — I50.20 HEART FAILURE WITH REDUCED EJECTION FRACTION: Primary | ICD-10-CM

## 2023-11-17 PROCEDURE — 1160F PR REVIEW ALL MEDS BY PRESCRIBER/CLIN PHARMACIST DOCUMENTED: ICD-10-PCS | Mod: CPTII,,, | Performed by: NURSE PRACTITIONER

## 2023-11-17 PROCEDURE — 3078F DIAST BP <80 MM HG: CPT | Mod: CPTII,,, | Performed by: NURSE PRACTITIONER

## 2023-11-17 PROCEDURE — 1159F PR MEDICATION LIST DOCUMENTED IN MEDICAL RECORD: ICD-10-PCS | Mod: CPTII,,, | Performed by: NURSE PRACTITIONER

## 2023-11-17 PROCEDURE — 99214 PR OFFICE/OUTPT VISIT, EST, LEVL IV, 30-39 MIN: ICD-10-PCS | Mod: S$PBB,,, | Performed by: NURSE PRACTITIONER

## 2023-11-17 PROCEDURE — 1160F RVW MEDS BY RX/DR IN RCRD: CPT | Mod: CPTII,,, | Performed by: NURSE PRACTITIONER

## 2023-11-17 PROCEDURE — 3288F FALL RISK ASSESSMENT DOCD: CPT | Mod: CPTII,,, | Performed by: NURSE PRACTITIONER

## 2023-11-17 PROCEDURE — 3288F PR FALLS RISK ASSESSMENT DOCUMENTED: ICD-10-PCS | Mod: CPTII,,, | Performed by: NURSE PRACTITIONER

## 2023-11-17 PROCEDURE — 3078F PR MOST RECENT DIASTOLIC BLOOD PRESSURE < 80 MM HG: ICD-10-PCS | Mod: CPTII,,, | Performed by: NURSE PRACTITIONER

## 2023-11-17 PROCEDURE — 1101F PT FALLS ASSESS-DOCD LE1/YR: CPT | Mod: CPTII,,, | Performed by: NURSE PRACTITIONER

## 2023-11-17 PROCEDURE — 99214 OFFICE O/P EST MOD 30 MIN: CPT | Mod: S$PBB,,, | Performed by: NURSE PRACTITIONER

## 2023-11-17 PROCEDURE — 3074F SYST BP LT 130 MM HG: CPT | Mod: CPTII,,, | Performed by: NURSE PRACTITIONER

## 2023-11-17 PROCEDURE — 1101F PR PT FALLS ASSESS DOC 0-1 FALLS W/OUT INJ PAST YR: ICD-10-PCS | Mod: CPTII,,, | Performed by: NURSE PRACTITIONER

## 2023-11-17 PROCEDURE — 3074F PR MOST RECENT SYSTOLIC BLOOD PRESSURE < 130 MM HG: ICD-10-PCS | Mod: CPTII,,, | Performed by: NURSE PRACTITIONER

## 2023-11-17 PROCEDURE — 1159F MED LIST DOCD IN RCRD: CPT | Mod: CPTII,,, | Performed by: NURSE PRACTITIONER

## 2023-11-17 PROCEDURE — 93005 ELECTROCARDIOGRAM TRACING: CPT

## 2023-11-17 PROCEDURE — 99214 OFFICE O/P EST MOD 30 MIN: CPT | Mod: PBBFAC | Performed by: NURSE PRACTITIONER

## 2023-11-17 RX ORDER — ATORVASTATIN CALCIUM 20 MG/1
20 TABLET, FILM COATED ORAL DAILY
Qty: 90 TABLET | Refills: 3 | Status: SHIPPED | OUTPATIENT
Start: 2023-11-17 | End: 2024-11-16

## 2023-11-17 NOTE — PATIENT INSTRUCTIONS
EKG  Decrease Atorvastatin to 20 mg qd   Nurse visit in 2 weeks for BP check (call)  Follow up in Cardiology Clinic in 4 months with FLP or sooner if needed  Follow up with PCP as directed  Strict ED precautions

## 2024-02-07 ENCOUNTER — OFFICE VISIT (OUTPATIENT)
Dept: OTOLARYNGOLOGY | Facility: CLINIC | Age: 84
End: 2024-02-07
Payer: MEDICAID

## 2024-02-07 VITALS — DIASTOLIC BLOOD PRESSURE: 63 MMHG | TEMPERATURE: 98 F | SYSTOLIC BLOOD PRESSURE: 93 MMHG | HEART RATE: 96 BPM

## 2024-02-07 DIAGNOSIS — H90.3 SENSORINEURAL HEARING LOSS (SNHL) OF BOTH EARS: Primary | ICD-10-CM

## 2024-02-07 DIAGNOSIS — H61.23 BILATERAL IMPACTED CERUMEN: ICD-10-CM

## 2024-02-07 PROCEDURE — 99213 OFFICE O/P EST LOW 20 MIN: CPT | Mod: 25,S$PBB,, | Performed by: NURSE PRACTITIONER

## 2024-02-07 PROCEDURE — 3078F DIAST BP <80 MM HG: CPT | Mod: CPTII,,, | Performed by: NURSE PRACTITIONER

## 2024-02-07 PROCEDURE — 69210 REMOVE IMPACTED EAR WAX UNI: CPT | Mod: 50,PBBFAC | Performed by: NURSE PRACTITIONER

## 2024-02-07 PROCEDURE — 69210 REMOVE IMPACTED EAR WAX UNI: CPT | Mod: S$PBB,,, | Performed by: NURSE PRACTITIONER

## 2024-02-07 PROCEDURE — 3074F SYST BP LT 130 MM HG: CPT | Mod: CPTII,,, | Performed by: NURSE PRACTITIONER

## 2024-02-07 PROCEDURE — 1101F PT FALLS ASSESS-DOCD LE1/YR: CPT | Mod: CPTII,,, | Performed by: NURSE PRACTITIONER

## 2024-02-07 PROCEDURE — 1125F AMNT PAIN NOTED PAIN PRSNT: CPT | Mod: CPTII,,, | Performed by: NURSE PRACTITIONER

## 2024-02-07 PROCEDURE — 1159F MED LIST DOCD IN RCRD: CPT | Mod: CPTII,,, | Performed by: NURSE PRACTITIONER

## 2024-02-07 PROCEDURE — 3288F FALL RISK ASSESSMENT DOCD: CPT | Mod: CPTII,,, | Performed by: NURSE PRACTITIONER

## 2024-02-07 PROCEDURE — 99213 OFFICE O/P EST LOW 20 MIN: CPT | Mod: PBBFAC,25 | Performed by: NURSE PRACTITIONER

## 2024-02-07 NOTE — PROGRESS NOTES
[facility]  Otolaryngology Clinic Note    Gilmar Vera  Encounter Date: 10/31/2022  YOB: 1940    Chief Complaint: hearing loss    HPI: 10/31/2022: 81yoF referred for hearing loss. She reports this began about 8 years ago, but has progressively worsened. This was markedly noticed after she had covid a year ago. She denies otalgia, otorrhea, or dizziness. She does endorse intermittent tinnitus described as buzzing. Denies any hx of otologic infections, procedures, or otologic surgeries. Has had a little runny nose for a few days but it not interested in treatment.    24: Returns interested in hearing aids. Daughter states hearing has continued to decline. She denies c/o otherwise.     ROS:   10-point review of systems negative except per HPI      Review of patient's allergies indicates:   Allergen Reactions    Furosemide        Past Medical History:   Diagnosis Date    CHF (congestive heart failure) See records    Clotting disorder     Hearing loss 2 years ago    After Covid it got worse    Hyperlipidemia     Hypertension     Obesity N/a       Past Surgical History:   Procedure Laterality Date    APPENDECTOMY      HIP REPLACEMENT ARTHROPLASTY      HIP SURGERY  7 years ago    JOINT REPLACEMENT  2016    tolsillectomy         Social History     Socioeconomic History    Marital status:    Tobacco Use    Smoking status: Former     Packs/day: 0.50     Years: 15.00     Pack years: 7.50     Types: Cigarettes     Start date: 1968     Quit date: 2010     Years since quittin.8    Smokeless tobacco: Never    Tobacco comments:     I stop smoking more than 10 years ago   Substance and Sexual Activity    Alcohol use: Not Currently    Drug use: Not Currently    Sexual activity: Not Currently       Family History   Problem Relation Age of Onset    Stroke Mother     Hypertension Mother     Kidney failure Father        Outpatient Encounter Medications as of 10/31/2022  "  Medication Sig Dispense Refill    aspirin (ECOTRIN) 81 MG EC tablet Take 81 mg by mouth once daily.      atorvastatin (LIPITOR) 40 MG tablet atorvastatin Take No date recorded No form recorded No frequency recorded No route recorded No set duration recorded No set duration amount recorded active No dosage strength recorded No dosage strength units of measure recorded      ELIQUIS 5 mg Tab Take 5 mg by mouth 2 (two) times daily.      fluticasone propionate (FLONASE) 50 mcg/actuation nasal spray Flonase Allergy Relief Take 2 spray(s) (Intranasal) 1 time per day for 7 days [id number] spray,suspension 1 time per day Intranasal 7 days active 50 mcg/actuation      losartan (COZAAR) 25 MG tablet Take 25 mg by mouth once daily.      metoprolol tartrate (LOPRESSOR) 25 MG tablet Take 25 mg by mouth 2 (two) times daily.      nitroGLYCERIN (NITROSTAT) 0.4 MG SL tablet DISSOLVE 1 TABLET UNDER THE TONGUE EVERY 5 MINUTES AS NEEDED FOR CHEST PAIN UP TO 3 DOSES IN 15 MINUTES. IF NO RELIEF, GO TO ER      polyethylene glycol (GLYCOLAX) 17 gram PwPk Take 17 g by mouth once daily. 473 each 5     No facility-administered encounter medications on file as of 10/31/2022.       Physical Exam:  Vitals:    10/31/22 0942   BP: 111/75   BP Location: Right arm   Patient Position: Sitting   BP Method: Large (Automatic)   Pulse: 71   Temp: 98.5 °F (36.9 °C)   TempSrc: Oral   Weight: 95.3 kg (210 lb)   Height: 5' 4.57" (1.64 m)     General: NAD, voice normal  Neuro: AAO, CN II - XII grossly intact  Head/ Face: NCAT, symmetric, sensations intact bilaterally  Eyes: EOMI, PERRL  Ears: externally normal with grossly normal hearing  AD: EAC occluded with cerumen- atraumatic removal under microscopy with alligator- TM intact, no middle ear effusion, no retractions  AS: EAC occluded with cerumen- atraumatic removal under microscopy with alligator- TM intact, no middle ear effusion, no retractions  Nose: bilateral nares patent, midline septum, mucoid " rhinorrhea, no external deformity, +ITH  OC/OP: MMM, no intraoral lesions, no trismus, dentition is moderate, no uvular deviation, bilaterally symmetric soft palate elevation, palatoglossus and palatopharyngeal fold wnl; tonsils are symmetric/absent  Indirect laryngoscopy: deferred due to patient intolerance  Neck: soft, supple, no LAD, normal ROM, no thyromegaly  Respiratory: nonlabored, no wheezing, bilateral chest rise  Cardiovascular: RRR  Gastrointestinal: S NT ND  Skin: warm, no lesions  Musculoskeletal: 5/5 strength  Psych: Appropriate affect/mood     Pertinent Data:  ? LABS:  ? AUDIO:                 Assessment/Plan:  81 y.o. female referred for hearing loss. Mild to moderately severe b/l SNHL on previous audio. D/w audiology & given information for LACD.   - Medically cleared for amplification  - RTC prn     Zara Leal NP

## 2024-03-15 ENCOUNTER — OFFICE VISIT (OUTPATIENT)
Dept: FAMILY MEDICINE | Facility: CLINIC | Age: 84
End: 2024-03-15
Payer: MEDICAID

## 2024-03-15 VITALS
WEIGHT: 196.81 LBS | HEIGHT: 62 IN | DIASTOLIC BLOOD PRESSURE: 70 MMHG | BODY MASS INDEX: 36.22 KG/M2 | HEART RATE: 80 BPM | TEMPERATURE: 98 F | OXYGEN SATURATION: 96 % | SYSTOLIC BLOOD PRESSURE: 102 MMHG

## 2024-03-15 DIAGNOSIS — I48.20 CHRONIC ATRIAL FIBRILLATION: ICD-10-CM

## 2024-03-15 DIAGNOSIS — E78.5 HYPERLIPIDEMIA, UNSPECIFIED HYPERLIPIDEMIA TYPE: ICD-10-CM

## 2024-03-15 DIAGNOSIS — I10 PRIMARY HYPERTENSION: ICD-10-CM

## 2024-03-15 DIAGNOSIS — R30.0 DYSURIA: Primary | ICD-10-CM

## 2024-03-15 LAB
APPEARANCE UR: ABNORMAL
BACTERIA #/AREA URNS AUTO: ABNORMAL /HPF
BILIRUB UR QL STRIP.AUTO: NEGATIVE
CAOX CRY URNS QL MICRO: ABNORMAL /HPF
COLOR UR AUTO: ABNORMAL
GLUCOSE UR QL STRIP.AUTO: NORMAL
HYALINE CASTS #/AREA URNS LPF: ABNORMAL /LPF
KETONES UR QL STRIP.AUTO: NEGATIVE
LEUKOCYTE ESTERASE UR QL STRIP.AUTO: 500
MUCOUS THREADS URNS QL MICRO: ABNORMAL /LPF
NITRITE UR QL STRIP.AUTO: NEGATIVE
PH UR STRIP.AUTO: 6 [PH]
PROT UR QL STRIP.AUTO: NEGATIVE
RBC #/AREA URNS AUTO: ABNORMAL /HPF
RBC UR QL AUTO: NEGATIVE
SP GR UR STRIP.AUTO: 1.01 (ref 1–1.03)
SQUAMOUS #/AREA URNS LPF: ABNORMAL /HPF
UROBILINOGEN UR STRIP-ACNC: NORMAL
WBC #/AREA URNS AUTO: ABNORMAL /HPF

## 2024-03-15 PROCEDURE — 1126F AMNT PAIN NOTED NONE PRSNT: CPT | Mod: CPTII,,, | Performed by: FAMILY MEDICINE

## 2024-03-15 PROCEDURE — 3074F SYST BP LT 130 MM HG: CPT | Mod: CPTII,,, | Performed by: FAMILY MEDICINE

## 2024-03-15 PROCEDURE — 99214 OFFICE O/P EST MOD 30 MIN: CPT | Mod: PBBFAC | Performed by: FAMILY MEDICINE

## 2024-03-15 PROCEDURE — 3078F DIAST BP <80 MM HG: CPT | Mod: CPTII,,, | Performed by: FAMILY MEDICINE

## 2024-03-15 PROCEDURE — 81001 URINALYSIS AUTO W/SCOPE: CPT | Performed by: FAMILY MEDICINE

## 2024-03-15 PROCEDURE — 87086 URINE CULTURE/COLONY COUNT: CPT | Performed by: FAMILY MEDICINE

## 2024-03-15 PROCEDURE — 1101F PT FALLS ASSESS-DOCD LE1/YR: CPT | Mod: CPTII,,, | Performed by: FAMILY MEDICINE

## 2024-03-15 PROCEDURE — 99214 OFFICE O/P EST MOD 30 MIN: CPT | Mod: S$PBB,,, | Performed by: FAMILY MEDICINE

## 2024-03-15 PROCEDURE — 3288F FALL RISK ASSESSMENT DOCD: CPT | Mod: CPTII,,, | Performed by: FAMILY MEDICINE

## 2024-03-15 PROCEDURE — 1159F MED LIST DOCD IN RCRD: CPT | Mod: CPTII,,, | Performed by: FAMILY MEDICINE

## 2024-03-15 PROCEDURE — 1160F RVW MEDS BY RX/DR IN RCRD: CPT | Mod: CPTII,,, | Performed by: FAMILY MEDICINE

## 2024-03-15 RX ORDER — LISINOPRIL 10 MG/1
TABLET ORAL
COMMUNITY
End: 2024-03-15

## 2024-03-15 RX ORDER — DOXYCYCLINE 100 MG/1
100 CAPSULE ORAL 2 TIMES DAILY
COMMUNITY
Start: 2023-10-09

## 2024-03-15 RX ORDER — NITROFURANTOIN 25; 75 MG/1; MG/1
100 CAPSULE ORAL 2 TIMES DAILY
Qty: 10 CAPSULE | Refills: 0 | Status: SHIPPED | OUTPATIENT
Start: 2024-03-15

## 2024-03-15 NOTE — PROGRESS NOTES
Patient Name: Gilmar Vera   : 1940  MRN: [id number]     SUBJECTIVE:  Gilmar Vera is a 83 y.o. female here for Follow-up (6 Mth pt states that everything is ok and states that she gets bladder infections maybe once or twice a year.)  .    HPI  Here for routine follow-up. Accompanied by her daughter who translates for her.  Patient feels well, except for slight dysuria feeling with no hematuria, no flank pain, no fever or chills.  Saw cardiology about 4 months ago regarding history of CAD, heart failure, AFib and MR.  changed atorvastatin, decreased to 20 mg from 40 mg. Lipid panel normal.  Patient reports compliance with losartan, metoprolol baby aspirin, atorvastatin, Eliquis.  Rarely taking lasix, once in 10 days or say  Bp borderline low. at home systolic around 110.  Today 102/70 but daughter states that will be a little better once she drinks her morning coffee.  Has appointment with cardiology next week.  CMP slightly elevated AST.  Had pneumococcal vaccine second one in [facility] a year after the first dose.  No records though    ALLERGIES:   Review of patient's allergies indicates:   Allergen Reactions    Aldactone [spironolactone]     Furosemide Other (See Comments)         ROS:  Review of Systems   Constitutional:  Negative for chills, fever and weight loss.   HENT:  Negative for congestion.    Eyes:  Negative for blurred vision.   Respiratory:  Negative for cough and shortness of breath.    Cardiovascular:  Negative for chest pain, palpitations and leg swelling.   Gastrointestinal:  Negative for abdominal pain, blood in stool, diarrhea, nausea and vomiting.   Genitourinary:  Positive for dysuria. Negative for hematuria.   Neurological:  Negative for dizziness and headaches.   Psychiatric/Behavioral:  Negative for depression. The patient is not nervous/anxious.          OBJECTIVE:  Vital signs  Vitals:    03/15/24 0816   BP: 102/70   BP Location: Right arm   Patient Position: Sitting   BP Method: Large  "(Automatic)   Pulse: 80   Temp: 97.7 °F (36.5 °C)   TempSrc: Oral   SpO2: 96%   Weight: 89.3 kg (196 lb 12.8 oz)   Height: 5' 2" (1.575 m)      Body mass index is 36 kg/m².    PHYSICAL EXAM:   Physical Exam  Vitals reviewed.   Constitutional:       General: She is not in acute distress.     Appearance: Normal appearance. She is not ill-appearing.   HENT:      Head: Normocephalic and atraumatic.      Right Ear: External ear normal.      Left Ear: External ear normal.      Nose: Nose normal. No rhinorrhea.      Mouth/Throat:      Mouth: Mucous membranes are moist.   Eyes:      General: No scleral icterus.        Right eye: No discharge.         Left eye: No discharge.      Conjunctiva/sclera: Conjunctivae normal.      Pupils: Pupils are equal, round, and reactive to light.   Cardiovascular:      Rate and Rhythm: Normal rate and regular rhythm.   Pulmonary:      Effort: Pulmonary effort is normal. No respiratory distress.      Breath sounds: No wheezing, rhonchi or rales.   Abdominal:      General: Bowel sounds are normal. There is no distension.      Palpations: Abdomen is soft.      Tenderness: There is no abdominal tenderness.   Musculoskeletal:      Cervical back: Normal range of motion and neck supple. No rigidity or tenderness.      Right lower leg: No edema.      Left lower leg: No edema.   Skin:     General: Skin is warm.      Findings: No rash.   Neurological:      General: No focal deficit present.      Mental Status: She is alert and oriented to person, place, and time.   Psychiatric:         Mood and Affect: Mood normal.         Behavior: Behavior normal.          ASSESSMENT/PLAN:  1. Dysuria  Check UA and sent it for culture.  In the meantime start Macrobid.  Will update patient with results  -     nitrofurantoin, macrocrystal-monohydrate, (MACROBID) 100 MG capsule; Take 1 capsule (100 mg total) by mouth 2 (two) times daily.  Dispense: 10 capsule; Refill: 0  -     Urinalysis, Reflex to Urine Culture  -     " Urine culture    2. Primary hypertension  Borderline low and chart reviewing it has been around this level.  Recommended to decrease amount of losartan or discontinue it but they would prefer to talk to Cardiology 1st.  Having an appointment next week.  Another option would be to switch from metoprolol tartrate to metoprolol succinate having atrial fibrillation, to affect more the heart rate and not so much the blood pressure.  -     CBC Auto Differential; Future; Expected date: 03/15/2024  -     Comprehensive Metabolic Panel; Future; Expected date: 03/15/2024  -     Hemoglobin A1C; Future; Expected date: 03/15/2024  -     TSH; Future; Expected date: 03/15/2024    3. Chronic atrial fibrillation  Continue with Eliquis 5 mg b.i.d. and metoprolol 25 mg b.i.d. for now even though Toprol XL would be a better option given the borderline hypotension.  Defer to Cardiology    4. Hyperlipidemia, unspecified hyperlipidemia type  Continue with Lipitor 20 mg as decreased from Cardiology.           Previous medical history/lab work/radiology reviewed and considered during medical management decisions.   Medication list reviewed and medication reconciliation performed.  Patient was provided  and care about his/her current diagnosis (es) and medications including risk/benefit and side effects/adverse events, over the counter medication uses/doses, home self-care and contact precautions,  and red flags and indications for when to seek immediate medical attention.   Patient was advised to continue compliance with current medication list and medical recommendations.  Recommended/ Advised continued compliance with recommended eating habits/ diets for medical conditions and exercise 150 minutes/ week (if possible) for medical condition (s).        RESULTS:  Recent Results (from the past 1008 hour(s))   Lipid Panel    Collection Time: 02/07/24  9:15 AM   Result Value Ref Range    Cholesterol Total 101 <=200 mg/dL    HDL  Cholesterol 44 35 - 60 mg/dL    Triglyceride 37 37 - 140 mg/dL    Cholesterol/HDL Ratio 2 0 - 5    Very Low Density Lipoprotein 7     LDL Cholesterol 50.00 50.00 - 140.00 mg/dL   Urinalysis, Reflex to Urine Culture    Collection Time: 03/15/24  9:07 AM    Specimen: Urine   Result Value Ref Range    Color, UA Light-Yellow Yellow, Light-Yellow, Dark Yellow, Ruchi, Straw    Appearance, UA Turbid (A) Clear    Specific Gravity, UA 1.014 1.005 - 1.030    pH, UA 6.0 5.0 - 8.5    Protein, UA Negative Negative    Glucose, UA Normal Negative, Normal    Ketones, UA Negative Negative    Blood, UA Negative Negative    Bilirubin, UA Negative Negative    Urobilinogen, UA Normal 0.2, 1.0, Normal    Nitrites, UA Negative Negative    Leukocyte Esterase,  (A) Negative    WBC, UA 21-50 (A) None Seen, 0-2, 3-5, 0-5 /HPF    Bacteria, UA Trace (A) None Seen /HPF    Squamous Epithelial Cells, UA Many (A) None Seen /HPF    Mucous, UA Trace (A) None Seen /LPF    Hyaline Casts, UA None Seen None Seen /lpf    Calcium Oxalate Crystals, UA Many (A) None Seen /HPF    RBC, UA 11-20 (A) None Seen, 0-2, 3-5, 0-5 /HPF         Follow Up:  Follow up in about 6 months (around 9/15/2024) for htn.         This note was created with the assistance of a voice recognition software or phone dictation. There may be transcription errors as a result of using this technology however minimal. Effort has been made to assure accuracy of transcription but any obvious errors or omissions should be clarified with the author of the document

## 2024-03-17 LAB — BACTERIA UR CULT: NORMAL

## 2024-03-18 ENCOUNTER — OFFICE VISIT (OUTPATIENT)
Dept: CARDIOLOGY | Facility: CLINIC | Age: 84
End: 2024-03-18
Payer: MEDICAID

## 2024-03-18 VITALS
SYSTOLIC BLOOD PRESSURE: 96 MMHG | BODY MASS INDEX: 33.35 KG/M2 | DIASTOLIC BLOOD PRESSURE: 54 MMHG | WEIGHT: 195.38 LBS | HEIGHT: 64 IN | RESPIRATION RATE: 18 BRPM | TEMPERATURE: 98 F | OXYGEN SATURATION: 98 % | HEART RATE: 80 BPM

## 2024-03-18 DIAGNOSIS — I50.20 HEART FAILURE WITH REDUCED EJECTION FRACTION: ICD-10-CM

## 2024-03-18 DIAGNOSIS — E78.2 MIXED HYPERLIPIDEMIA: ICD-10-CM

## 2024-03-18 DIAGNOSIS — I48.20 CHRONIC ATRIAL FIBRILLATION: ICD-10-CM

## 2024-03-18 DIAGNOSIS — I25.10 CORONARY ARTERY DISEASE INVOLVING NATIVE CORONARY ARTERY OF NATIVE HEART WITHOUT ANGINA PECTORIS: Primary | ICD-10-CM

## 2024-03-18 DIAGNOSIS — R94.39 ABNORMAL STRESS TEST: ICD-10-CM

## 2024-03-18 DIAGNOSIS — R60.0 PERIPHERAL EDEMA: ICD-10-CM

## 2024-03-18 PROCEDURE — 1101F PT FALLS ASSESS-DOCD LE1/YR: CPT | Mod: CPTII,,, | Performed by: NURSE PRACTITIONER

## 2024-03-18 PROCEDURE — 99214 OFFICE O/P EST MOD 30 MIN: CPT | Mod: S$PBB,,, | Performed by: NURSE PRACTITIONER

## 2024-03-18 PROCEDURE — 1160F RVW MEDS BY RX/DR IN RCRD: CPT | Mod: CPTII,,, | Performed by: NURSE PRACTITIONER

## 2024-03-18 PROCEDURE — 3078F DIAST BP <80 MM HG: CPT | Mod: CPTII,,, | Performed by: NURSE PRACTITIONER

## 2024-03-18 PROCEDURE — 3288F FALL RISK ASSESSMENT DOCD: CPT | Mod: CPTII,,, | Performed by: NURSE PRACTITIONER

## 2024-03-18 PROCEDURE — 1159F MED LIST DOCD IN RCRD: CPT | Mod: CPTII,,, | Performed by: NURSE PRACTITIONER

## 2024-03-18 PROCEDURE — 3074F SYST BP LT 130 MM HG: CPT | Mod: CPTII,,, | Performed by: NURSE PRACTITIONER

## 2024-03-18 PROCEDURE — 99215 OFFICE O/P EST HI 40 MIN: CPT | Mod: PBBFAC | Performed by: NURSE PRACTITIONER

## 2024-03-18 RX ORDER — LOSARTAN POTASSIUM 25 MG/1
12.5 TABLET ORAL DAILY
Qty: 45 TABLET | Refills: 3 | Status: SHIPPED | OUTPATIENT
Start: 2024-03-18 | End: 2025-03-18

## 2024-03-18 NOTE — PATIENT INSTRUCTIONS
Decrease Losartan to 12.5 mg daily due to hypotension   Nurse visit in 2-3 weeks for BP check   Follow up in Cardiology Clinic in 6 months or sooner if needed  Follow up with PCP as directed  Strict ED precautions

## 2024-03-18 NOTE — PROGRESS NOTES
CHIEF COMPLAINT:   Chief Complaint   Patient presents with    Follow-up     Denies any cardiac ptroblems                   Review of patient's allergies indicates:   Allergen Reactions    Aldactone [spironolactone]     Furosemide Other (See Comments)                                          HPI:  Gilmar Vera 83 y.o. female with a past medical history of Nonobstructive CAD (55% stenosis of RCA), NICM (recovered) (EF prev. 40% - now 50% per ECHO 9.30.22), Chronic Atrial Fibrillation (on Eliquis) and MR who presents to cardiology clinic for routine follow up and ongoing care. Patient had an abnormal Lexiscan Stress Test in June 2020 which showed lateral-apical ischemia with no scar, EF of 90% with no wall motion abnormality that is currently being medically managed.  Notably, patient underwent a previous Left Heart Catheterization in 2017 that revealed nonobstructive CAD (55% eccentric stenosis proximal mid RCA and 30% distal stenosis to mLAD).  Latest Echocardiogram obtained on 9.30.22 revealed a preserved ejection fraction of 50%, mild-to-moderate VT, mild-to-moderate MR and mild-to-moderate TR.     Patient presents to clinic today denying any chest pain, shortness of breath, palpitations, orthopnea, PND, claudication, lightheadedness, dizziness, falls, near-syncope or syncope.  The patient endorses intermittent peripheral edema that typically occurs whenever her legs are dependent most of the day but notes overall improvement with elevation and p.r.n. diuretic use.  Activity wise, the patient states that she is able to perform her regular ADLs and ambulate around her apartment routinely without experiencing any chest pain or shortness of breath.  However she does endorse some generalized fatigue and low energy that may be associated with recent hypotension.    Patient's daughter is translating during clinic visit.     CARDIAC TESTING:  Echocardiogram 9.30.22:  The left ventricle is normal in size with eccentric  hypertrophy and low normal systolic function.  The estimated ejection fraction is 50%.  Indeterminate left ventricular diastolic function.  Mild right ventricular enlargement with low normal right ventricular systolic function.  Moderate right atrial enlargement.  Mild to moderate pulmonic regurgitation.  Mild-to-moderate mitral regurgitation.  Mild to moderate tricuspid regurgitation.  Mild left atrial enlargement.  There is mild pulmonary hypertension.    Lexiscan Stress Test 6/2020:   Lexiscan stress test June 2020:  Scan is consistent with: Lateral-apical ischemia  No scar  Ejection fraction: 90%  No wall motion abnormality    Echocardiogram (12/2019): EF of 50 to 55% with mild MR, mild TR and RVSP of 26 mmHg    [facility] (2017): Dominant RCA with moderate atherosclerotic plaque and 55% eccentric stenosis proximal mid vessel, LAD with mild atherosclerotic plaque and 30% distal stenosis, 3-4+ MR, Normal LVEF 60%                                                                                                                                                                                                                                               Patient Active Problem List   Diagnosis    Heart failure with reduced ejection fraction    Chronic atrial fibrillation    Coronary artery disease involving native coronary artery of native heart without angina pectoris    RHOADES (dyspnea on exertion)    Peripheral edema    Cardiac conduction disorder    Hyperlipidemia     Past Surgical History:   Procedure Laterality Date    APPENDECTOMY      HIP REPLACEMENT ARTHROPLASTY      HIP SURGERY  7 years ago    JOINT REPLACEMENT  2016    tolsillectomy       Social History     Socioeconomic History    Marital status:    Tobacco Use    Smoking status: Former     Current packs/day: 0.00     Average packs/day: 0.5 packs/day for 42.0 years (21.0 ttl pk-yrs)     Types: Cigarettes     Start date: 1/1/1968     Quit date: 1/1/2010     Years  since quittin.2    Smokeless tobacco: Never    Tobacco comments:     I stop smoking more than 10 years ago   Substance and Sexual Activity    Alcohol use: Not Currently    Drug use: Not Currently    Sexual activity: Not Currently        Family History   Problem Relation Age of Onset    Stroke Mother     Hypertension Mother     Kidney failure Father          Current Outpatient Medications:     aspirin (ECOTRIN) 81 MG EC tablet, Take 81 mg by mouth once daily., Disp: , Rfl:     atorvastatin (LIPITOR) 20 MG tablet, Take 1 tablet (20 mg total) by mouth once daily., Disp: 90 tablet, Rfl: 3    doxycycline (MONODOX) 100 MG capsule, Take 100 mg by mouth 2 (two) times daily., Disp: , Rfl:     ELIQUIS 5 mg Tab, Take 1 tablet (5 mg total) by mouth 2 (two) times daily., Disp: 180 tablet, Rfl: 3    furosemide (LASIX) 20 MG tablet, Take 1 tablet (20 mg total) by mouth daily as needed., Disp: 90 tablet, Rfl: 3    losartan (COZAAR) 25 MG tablet, Take 1 tablet (25 mg total) by mouth once daily., Disp: 90 tablet, Rfl: 3    metoprolol tartrate (LOPRESSOR) 25 MG tablet, Take 1 tablet (25 mg total) by mouth 2 (two) times daily., Disp: 180 tablet, Rfl: 3    nitrofurantoin, macrocrystal-monohydrate, (MACROBID) 100 MG capsule, Take 1 capsule (100 mg total) by mouth 2 (two) times daily., Disp: 10 capsule, Rfl: 0    nitroGLYCERIN (NITROSTAT) 0.4 MG SL tablet, DISSOLVE 1 TABLET UNDER THE TONGUE EVERY 5 MINUTES AS NEEDED FOR CHEST PAIN UP TO 3 DOSES IN 15 MINUTES. IF NO RELIEF, GO TO ER, Disp: , Rfl:     diclofenac sodium (VOLTAREN) 1 % Gel, Apply 2 g topically once daily. (Patient not taking: Reported on 2024), Disp: 400 g, Rfl: 1    polyethylene glycol (GLYCOLAX) 17 gram PwPk, Take 17 g by mouth once daily. (Patient not taking: Reported on 2024), Disp: 100 each, Rfl: 1     ROS:                                                                                                                                                            "                  Review of Systems   Respiratory:  Positive for shortness of breath.    Cardiovascular:  Positive for leg swelling.   Gastrointestinal: Negative.    Skin: Negative.    Neurological: Negative.    Psychiatric/Behavioral: Negative.          Blood pressure (!) 96/54, pulse 80, temperature 97.6 °F (36.4 °C), temperature source Oral, resp. rate 18, height 5' 4.17" (1.63 m), weight 88.6 kg (195 lb 6.4 oz), SpO2 98 %.   PE:  Physical Exam  Constitutional:       Appearance: Normal appearance.   HENT:      Head: Normocephalic and atraumatic.   Eyes:      Extraocular Movements: Extraocular movements intact.      Pupils: Pupils are equal, round, and reactive to light.   Cardiovascular:      Rate and Rhythm: Normal rate. Rhythm irregular.   Pulmonary:      Effort: Pulmonary effort is normal.      Breath sounds: Normal breath sounds.   Abdominal:      Palpations: Abdomen is soft.   Musculoskeletal:         General: Normal range of motion.      Cervical back: Normal range of motion.      Right lower leg: Edema present.      Left lower leg: Edema present.   Skin:     General: Skin is warm and dry.   Neurological:      General: No focal deficit present.      Mental Status: She is alert and oriented to person, place, and time.   Psychiatric:         Mood and Affect: Mood normal.         Behavior: Behavior normal.       ASSESSMENT/PLAN:  Abnormal Nuclear Stress Test   - Lexiscan Stress Test - lateral-apical ischemia with no scar,EF of 90% with no wall motion abnormality (6.5.20)  - Denies any  CP or SOB  - Continue medical management ASA, statin, losartan, metoprolol  and SL nitro prn   - Instructed patient and patient's daughter to notify clinic if she develops any anginal/anginal equivalent symptoms.  If symptoms persist despite optimization of medications can consider further evaluation at that time, may need to repeat stress test at this point or proceed with invasive evaluation pending symptoms   - Strict ED " precautions provided     Nonobstructive CAD per [facility] 2017  - [facility] - Dominant RCA with moderate atherosclerotic plaque and 55% stenosis in proximal and mid vessel, LAD with mid atherosclerotic plaque and 30% distal stenosis (2017)  - Lexiscan - positive for lateral-apical ischemia with no scar, EF of 90% with no wall motion abnormality (6.25.20)  - Denies any CP or SOB  - Continue Aspirin 81 mg, Atorvastatin 40 mg, Losartan (decreased dose), Metoprolol Tartrate 25 mg twice daily, and SL Nitro prn CP  - Counseled on heart healthy diet and increased activity as tolerated    HFrecEF/NICM  - [facility]  - Recovered EF 50% Sept. 2022-->EF-50-55% per ECHO Dec. 2019-->EF-40%  - Echo from 12/2019 showed EF of 50 to 55% with mild MR, mild TR and RVSP of 26 mmHg (improved from EF 40%)  - Denies SOB or RHOADES. Reports intermittent dependent edema   - Euvolemic upon exam, warm and dry  - Continue GDMT: Decrease Losartan to 12.5 mg due to hypotension.  Also recommended switching metoprolol tartrate to succinate.  However patient is not amenable at this time, stating that [place] within the next few weeks and prefers not to make too many medication modifications at this time  - Continue Lasix as directed  - Counseled on low salt diet    Atrial Fibrillation, chronic   - Denies palpitations   - Continue Metoprolol Tartrate 25 mg twice daily for now.  Not amenable to switching to succinate at this time   - Continued on Eliquis 5 mg twice daily. Denies any adverse bleeding issues     HLD  - LDL-50 at goal   - Continue Atorvastatin 20 mg daily   - Advised on heart healthy, low-cholesterol diet and exercise as tolerated    HTN  Hypotension  - Continue with metoprolol tartrate 25 mg b.i.d. and Lasix 20 mg prn. Decreasing Losartan to 12.5 mg daily due to hypotension   - NV in 2-3 weeks for BP check   - Advised on low sodium diet     VHD- Asymptomatic  - EF- 50%, mild to Moderate CT, mild to moderate  MR and mild to moderate TR per ECHO 9.30.22  - Will continue to monitor with serial Echocardiograms.  Due Sept/Oct. 2024    Decrease Losartan to 12.5 mg daily due to hypotension   Nurse visit in 2-3 weeks for BP check   Follow up in Cardiology Clinic in 6 months or sooner if needed  Follow up with PCP as directed  Strict ED precautions

## 2024-03-20 ENCOUNTER — TELEPHONE (OUTPATIENT)
Dept: FAMILY MEDICINE | Facility: CLINIC | Age: 84
End: 2024-03-20
Payer: MEDICAID

## 2024-03-20 NOTE — TELEPHONE ENCOUNTER
Patient's phone is not available.    ----- Message from Carey Mohamud MD sent at 3/19/2024  3:56 PM CDT -----  Please let the patient know that the urine culture was actually negative.  Thanks

## 2024-03-21 ENCOUNTER — TELEPHONE (OUTPATIENT)
Dept: FAMILY MEDICINE | Facility: CLINIC | Age: 84
End: 2024-03-21
Payer: MEDICAID

## 2024-03-21 NOTE — TELEPHONE ENCOUNTER
Informed patient that urine culture was negative patient voiced understanding.      ---- Message from Carey Mohamud MD sent at 3/19/2024  3:56 PM CDT -----  Please let the patient know that the urine culture was actually negative.  Thanks

## 2024-04-01 DIAGNOSIS — I25.10 CORONARY ARTERY DISEASE INVOLVING NATIVE CORONARY ARTERY OF NATIVE HEART WITHOUT ANGINA PECTORIS: Primary | ICD-10-CM

## 2024-04-02 DIAGNOSIS — I25.10 CORONARY ARTERY DISEASE INVOLVING NATIVE CORONARY ARTERY OF NATIVE HEART WITHOUT ANGINA PECTORIS: Primary | ICD-10-CM

## 2024-04-02 DIAGNOSIS — I48.20 CHRONIC ATRIAL FIBRILLATION: ICD-10-CM

## 2024-04-02 RX ORDER — METOPROLOL TARTRATE 25 MG/1
25 TABLET, FILM COATED ORAL 2 TIMES DAILY
Qty: 180 TABLET | Refills: 3 | Status: SHIPPED | OUTPATIENT
Start: 2024-04-02 | End: 2025-04-02

## 2024-04-02 RX ORDER — NITROGLYCERIN 0.4 MG/1
TABLET SUBLINGUAL
Qty: 25 TABLET | Refills: 1 | Status: SHIPPED | OUTPATIENT
Start: 2024-04-02

## 2024-07-31 ENCOUNTER — LAB VISIT (OUTPATIENT)
Dept: LAB | Facility: HOSPITAL | Age: 84
End: 2024-07-31
Attending: NURSE PRACTITIONER

## 2024-07-31 ENCOUNTER — OFFICE VISIT (OUTPATIENT)
Dept: CARDIOLOGY | Facility: CLINIC | Age: 84
End: 2024-07-31

## 2024-07-31 VITALS
SYSTOLIC BLOOD PRESSURE: 90 MMHG | HEIGHT: 64 IN | RESPIRATION RATE: 20 BRPM | TEMPERATURE: 98 F | WEIGHT: 178 LBS | DIASTOLIC BLOOD PRESSURE: 64 MMHG | OXYGEN SATURATION: 97 % | BODY MASS INDEX: 30.39 KG/M2 | HEART RATE: 60 BPM

## 2024-07-31 DIAGNOSIS — I10 PRIMARY HYPERTENSION: ICD-10-CM

## 2024-07-31 DIAGNOSIS — I50.20 HEART FAILURE WITH REDUCED EJECTION FRACTION: ICD-10-CM

## 2024-07-31 DIAGNOSIS — E78.2 MIXED HYPERLIPIDEMIA: ICD-10-CM

## 2024-07-31 DIAGNOSIS — I25.10 CORONARY ARTERY DISEASE INVOLVING NATIVE CORONARY ARTERY OF NATIVE HEART WITHOUT ANGINA PECTORIS: Primary | ICD-10-CM

## 2024-07-31 DIAGNOSIS — D64.9 ANEMIA, UNSPECIFIED TYPE: ICD-10-CM

## 2024-07-31 DIAGNOSIS — R60.0 PERIPHERAL EDEMA: ICD-10-CM

## 2024-07-31 DIAGNOSIS — Z95.5 STATUS POST CORONARY ARTERY STENT PLACEMENT: ICD-10-CM

## 2024-07-31 DIAGNOSIS — I38 VHD (VALVULAR HEART DISEASE): ICD-10-CM

## 2024-07-31 LAB
ALBUMIN SERPL-MCNC: 4 G/DL (ref 3.4–4.8)
ALBUMIN/GLOB SERPL: 1.1 RATIO (ref 1.1–2)
ALP SERPL-CCNC: 86 UNIT/L (ref 40–150)
ALT SERPL-CCNC: 15 UNIT/L (ref 0–55)
ANION GAP SERPL CALC-SCNC: 11 MEQ/L
AST SERPL-CCNC: 32 UNIT/L (ref 5–34)
BASOPHILS # BLD AUTO: 0.07 X10(3)/MCL
BASOPHILS NFR BLD AUTO: 0.7 %
BILIRUB SERPL-MCNC: 1.3 MG/DL
BUN SERPL-MCNC: 21.1 MG/DL (ref 9.8–20.1)
CALCIUM SERPL-MCNC: 10.9 MG/DL (ref 8.4–10.2)
CHLORIDE SERPL-SCNC: 101 MMOL/L (ref 98–107)
CO2 SERPL-SCNC: 24 MMOL/L (ref 23–31)
CREAT SERPL-MCNC: 1.15 MG/DL (ref 0.55–1.02)
CREAT/UREA NIT SERPL: 18
EOSINOPHIL # BLD AUTO: 0.14 X10(3)/MCL (ref 0–0.9)
EOSINOPHIL NFR BLD AUTO: 1.5 %
ERYTHROCYTE [DISTWIDTH] IN BLOOD BY AUTOMATED COUNT: 16 % (ref 11.5–17)
EST. AVERAGE GLUCOSE BLD GHB EST-MCNC: 119.8 MG/DL
GFR SERPLBLD CREATININE-BSD FMLA CKD-EPI: 47 ML/MIN/1.73/M2
GLOBULIN SER-MCNC: 3.5 GM/DL (ref 2.4–3.5)
GLUCOSE SERPL-MCNC: 132 MG/DL (ref 82–115)
HBA1C MFR BLD: 5.8 %
HCT VFR BLD AUTO: 41.9 % (ref 37–47)
HGB BLD-MCNC: 13.3 G/DL (ref 12–16)
IMM GRANULOCYTES # BLD AUTO: 0.04 X10(3)/MCL (ref 0–0.04)
IMM GRANULOCYTES NFR BLD AUTO: 0.4 %
LYMPHOCYTES # BLD AUTO: 2.46 X10(3)/MCL (ref 0.6–4.6)
LYMPHOCYTES NFR BLD AUTO: 25.8 %
MCH RBC QN AUTO: 27.2 PG (ref 27–31)
MCHC RBC AUTO-ENTMCNC: 31.7 G/DL (ref 33–36)
MCV RBC AUTO: 85.7 FL (ref 80–94)
MONOCYTES # BLD AUTO: 0.77 X10(3)/MCL (ref 0.1–1.3)
MONOCYTES NFR BLD AUTO: 8.1 %
NEUTROPHILS # BLD AUTO: 6.05 X10(3)/MCL (ref 2.1–9.2)
NEUTROPHILS NFR BLD AUTO: 63.5 %
NRBC BLD AUTO-RTO: 0 %
PLATELET # BLD AUTO: 220 X10(3)/MCL (ref 130–400)
PMV BLD AUTO: 9.5 FL (ref 7.4–10.4)
POTASSIUM SERPL-SCNC: 4.6 MMOL/L (ref 3.5–5.1)
PROT SERPL-MCNC: 7.5 GM/DL (ref 5.8–7.6)
RBC # BLD AUTO: 4.89 X10(6)/MCL (ref 4.2–5.4)
SODIUM SERPL-SCNC: 136 MMOL/L (ref 136–145)
TSH SERPL-ACNC: 0.96 UIU/ML (ref 0.35–4.94)
WBC # BLD AUTO: 9.53 X10(3)/MCL (ref 4.5–11.5)

## 2024-07-31 PROCEDURE — 80053 COMPREHEN METABOLIC PANEL: CPT

## 2024-07-31 PROCEDURE — 36415 COLL VENOUS BLD VENIPUNCTURE: CPT

## 2024-07-31 PROCEDURE — 85025 COMPLETE CBC W/AUTO DIFF WBC: CPT

## 2024-07-31 PROCEDURE — 83036 HEMOGLOBIN GLYCOSYLATED A1C: CPT

## 2024-07-31 PROCEDURE — 84443 ASSAY THYROID STIM HORMONE: CPT

## 2024-07-31 PROCEDURE — 99215 OFFICE O/P EST HI 40 MIN: CPT | Mod: PBBFAC | Performed by: NURSE PRACTITIONER

## 2024-07-31 PROCEDURE — 99214 OFFICE O/P EST MOD 30 MIN: CPT | Mod: S$PBB,,, | Performed by: NURSE PRACTITIONER

## 2024-07-31 RX ORDER — DAPAGLIFLOZIN 10 MG/1
10 TABLET, FILM COATED ORAL DAILY
COMMUNITY
End: 2024-07-31 | Stop reason: SDUPTHER

## 2024-07-31 RX ORDER — PANTOPRAZOLE SODIUM 40 MG/1
40 TABLET, DELAYED RELEASE ORAL DAILY
COMMUNITY

## 2024-07-31 RX ORDER — ALLOPURINOL 100 MG/1
100 TABLET ORAL DAILY
COMMUNITY

## 2024-07-31 RX ORDER — CLOPIDOGREL BISULFATE 75 MG/1
75 TABLET ORAL DAILY
Qty: 30 TABLET | Refills: 11 | Status: SHIPPED | OUTPATIENT
Start: 2024-07-31 | End: 2025-07-31

## 2024-07-31 RX ORDER — BISOPROLOL FUMARATE 5 MG/1
5 TABLET, FILM COATED ORAL DAILY
COMMUNITY

## 2024-07-31 RX ORDER — DAPAGLIFLOZIN 10 MG/1
10 TABLET, FILM COATED ORAL DAILY
Qty: 30 TABLET | Refills: 11 | Status: SHIPPED | OUTPATIENT
Start: 2024-07-31 | End: 2024-07-31 | Stop reason: SDUPTHER

## 2024-07-31 RX ORDER — DAPAGLIFLOZIN 10 MG/1
10 TABLET, FILM COATED ORAL DAILY
Qty: 30 TABLET | Refills: 11 | Status: SHIPPED | OUTPATIENT
Start: 2024-07-31 | End: 2025-07-31

## 2024-07-31 RX ORDER — CLOPIDOGREL BISULFATE 75 MG/1
75 TABLET ORAL DAILY
COMMUNITY
End: 2024-07-31 | Stop reason: SDUPTHER

## 2024-07-31 RX ORDER — SPIRONOLACTONE 25 MG/1
12.5 TABLET ORAL DAILY
Qty: 15 TABLET | Refills: 11 | Status: SHIPPED | OUTPATIENT
Start: 2024-07-31 | End: 2025-07-31

## 2024-07-31 RX ORDER — SPIRONOLACTONE 25 MG/1
25 TABLET ORAL DAILY
COMMUNITY
End: 2024-07-31 | Stop reason: SDUPTHER

## 2024-07-31 NOTE — PATIENT INSTRUCTIONS
CMP/CBC today   ECHO. Due October 2024  Decrease spironolactone to 12.5 mg daily due to hypotension  Nurse visit in 2-3 weeks for BP/HR check   Follow up in Cardiology Clinic in 3 months after ECHO completed or sooner pending results   Follow up with PCP as directed  Strict ED precautions

## 2024-07-31 NOTE — PROGRESS NOTES
CHIEF COMPLAINT:   Chief Complaint   Patient presents with    Follow-up     Patient denies any cardiac problems today.  Patient c/o having SOB in April with hospital stay.  Patient had stent placement at that time.                   Review of patient's allergies indicates:   Allergen Reactions    Aldactone [spironolactone]     Furosemide Other (See Comments)                                          HPI:  Gilmar Vera 83 y.o. female with a past medical history of CAD s/p PCI RCA (April 2024), NICM (recovered) (EF prev. 40% - now 50% per ECHO 9.30.22), Chronic Atrial Fibrillation (on Eliquis) and VHD who presents to cardiology clinic for routine follow up and ongoing care after a hospitalization.  The patient was visiting her home country of [facility] when she endorsed progressive shortness of breath with exertion and increased peripheral edema.  She was hospitalized in April with unstable angina in the setting of what seems to be decompensated heart failure.  The patient's daughter states the patient was diuresed and ultimately underwent a Left Heart Catheterization with PCI of RCA for a reported 99% stenosis.  The patient's medications were adjusted as well.   Of note, the patient completed a previous Lexiscan Stress Test in June 2020 that was abnormal, demonstrating  lateral-apical ischemia with no scar and no wall motion abnormality that was previously medically managed.  Notably, patient underwent a prior Left Heart Catheterization in 2017 that revealed nonobstructive CAD (55% eccentric stenosis proximal mid RCA and 30% distal stenosis to mLAD).    Latest Echocardiogram obtained on 9.30.22 revealed a preserved ejection fraction of 50%, mild-to-moderate MA, mild-to-moderate MR and mild-to-moderate TR.  The patient's daughter reports that the patient did have an Echocardiogram and Carotid Ultrasound completed prior to the patient returning to the [place] that were stable.    Patient presents to clinic today reporting  that she feels well since her hospitalization.  She denies any further shortness of breath and endorses significant improvement in her lower extremity edema.  The patient states that she also decreased to 1-2 pillows at night, as compared to 3-4.  She denies any other cardiac complaints of chest pain, palpitations, PND, claudication, or syncope.  However, the patient does endorse occasional episodes of lightheadedness, which her family member believes may be related to hypotension.  Activity wise, the patient has increased her activity and has started ambulating daily in the evening with her daughter.    Patient's daughter is translating during clinic visit.       CARDIAC TESTING:  Echocardiogram 9.30.22:  The left ventricle is normal in size with eccentric hypertrophy and low normal systolic function.  The estimated ejection fraction is 50%.  Indeterminate left ventricular diastolic function.  Mild right ventricular enlargement with low normal right ventricular systolic function.  Moderate right atrial enlargement.  Mild to moderate pulmonic regurgitation.  Mild-to-moderate mitral regurgitation.  Mild to moderate tricuspid regurgitation.  Mild left atrial enlargement.  There is mild pulmonary hypertension.    Lexiscan Stress Test 6/2020:   Lexiscan stress test June 2020:  Scan is consistent with: Lateral-apical ischemia  No scar  Ejection fraction: 90%  No wall motion abnormality    Echocardiogram (12/2019): EF of 50 to 55% with mild MR, mild TR and RVSP of 26 mmHg    [facility] (2017): Dominant RCA with moderate atherosclerotic plaque and 55% eccentric stenosis proximal mid vessel, LAD with mild atherosclerotic plaque and 30% distal stenosis, 3-4+ MR, Normal LVEF 60%                                                                                                                                                                                                                                               Patient Active  Problem List   Diagnosis    Heart failure with reduced ejection fraction    Chronic atrial fibrillation    Coronary artery disease involving native coronary artery of native heart without angina pectoris    RHOADES (dyspnea on exertion)    Peripheral edema    Cardiac conduction disorder    Hyperlipidemia     Past Surgical History:   Procedure Laterality Date    APPENDECTOMY      HIP REPLACEMENT ARTHROPLASTY      HIP SURGERY  7 years ago    JOINT REPLACEMENT  2016    tolsillectomy       Social History     Socioeconomic History    Marital status:    Tobacco Use    Smoking status: Former     Current packs/day: 0.00     Average packs/day: 0.5 packs/day for 42.0 years (21.0 ttl pk-yrs)     Types: Cigarettes     Start date: 1968     Quit date: 2010     Years since quittin.5    Smokeless tobacco: Never    Tobacco comments:     I stop smoking more than 10 years ago   Substance and Sexual Activity    Alcohol use: Not Currently    Drug use: Not Currently    Sexual activity: Not Currently        Family History   Problem Relation Name Age of Onset    Stroke Mother Ash Hernandez     Hypertension Mother Ash Hernandez     Kidney failure Father           Current Outpatient Medications:     allopurinoL (ZYLOPRIM) 100 MG tablet, Take 100 mg by mouth once daily., Disp: , Rfl:     atorvastatin (LIPITOR) 20 MG tablet, Take 1 tablet (20 mg total) by mouth once daily., Disp: 90 tablet, Rfl: 3    bisoprolol (ZEBETA) 5 MG tablet, Take 5 mg by mouth once daily., Disp: , Rfl:     clopidogreL (PLAVIX) 75 mg tablet, Take 75 mg by mouth once daily., Disp: , Rfl:     dapagliflozin propanediol (FARXIGA) 10 mg tablet, Take 10 mg by mouth once daily., Disp: , Rfl:     ELIQUIS 5 mg Tab, Take 1 tablet (5 mg total) by mouth 2 (two) times daily., Disp: 180 tablet, Rfl: 3    furosemide (LASIX) 20 MG tablet, Take 1 tablet (20 mg total) by mouth daily as needed., Disp: 90 tablet, Rfl: 3    nitroGLYCERIN (NITROSTAT) 0.4 MG SL tablet,  DISSOLVE 1 TABLET UNDER THE TONGUE EVERY 5 MINUTES AS NEEDED FOR CHEST PAIN UP TO 3 DOSES IN 15 MINUTES. IF NO RELIEF, GO TO ER, Disp: 25 tablet, Rfl: 1    pantoprazole (PROTONIX) 40 MG tablet, Take 40 mg by mouth once daily., Disp: , Rfl:     spironolactone (ALDACTONE) 25 MG tablet, Take 25 mg by mouth once daily., Disp: , Rfl:     aspirin (ECOTRIN) 81 MG EC tablet, Take 81 mg by mouth once daily. (Patient not taking: Reported on 7/31/2024), Disp: , Rfl:     diclofenac sodium (VOLTAREN) 1 % Gel, Apply 2 g topically once daily. (Patient not taking: Reported on 2/7/2024), Disp: 400 g, Rfl: 1    doxycycline (MONODOX) 100 MG capsule, Take 100 mg by mouth 2 (two) times daily. (Patient not taking: Reported on 7/31/2024), Disp: , Rfl:     losartan (COZAAR) 25 MG tablet, Take 0.5 tablets (12.5 mg total) by mouth once daily. (Patient not taking: Reported on 7/31/2024), Disp: 45 tablet, Rfl: 3    metoprolol tartrate (LOPRESSOR) 25 MG tablet, Take 1 tablet (25 mg total) by mouth 2 (two) times daily. (Patient not taking: Reported on 7/31/2024), Disp: 180 tablet, Rfl: 3    nitrofurantoin, macrocrystal-monohydrate, (MACROBID) 100 MG capsule, Take 1 capsule (100 mg total) by mouth 2 (two) times daily. (Patient not taking: Reported on 7/31/2024), Disp: 10 capsule, Rfl: 0    polyethylene glycol (GLYCOLAX) 17 gram PwPk, Take 17 g by mouth once daily. (Patient not taking: Reported on 2/7/2024), Disp: 100 each, Rfl: 1     ROS:                                                                                                                                                                             Review of Systems   Respiratory:  Negative for shortness of breath.    Cardiovascular:  Positive for leg swelling. Negative for chest pain.   Gastrointestinal: Negative.    Skin: Negative.    Neurological: Negative.    Psychiatric/Behavioral: Negative.          Blood pressure 90/64, pulse 60, temperature 97.8 °F (36.6 °C), temperature source  "Oral, resp. rate 20, height 5' 4" (1.626 m), weight 80.7 kg (178 lb), SpO2 97%.   PE:  Physical Exam  Constitutional:       Appearance: Normal appearance.   HENT:      Head: Normocephalic and atraumatic.   Eyes:      Extraocular Movements: Extraocular movements intact.      Pupils: Pupils are equal, round, and reactive to light.   Cardiovascular:      Rate and Rhythm: Normal rate. Rhythm irregular.   Pulmonary:      Effort: Pulmonary effort is normal.      Breath sounds: Normal breath sounds.   Abdominal:      Palpations: Abdomen is soft.   Musculoskeletal:         General: Normal range of motion.      Cervical back: Normal range of motion.      Right lower leg: Edema present.      Left lower leg: Edema present.   Skin:     General: Skin is warm and dry.   Neurological:      General: No focal deficit present.      Mental Status: She is alert and oriented to person, place, and time.   Psychiatric:         Mood and Affect: Mood normal.         Behavior: Behavior normal.       ASSESSMENT/PLAN:  CAD s/p PCI RCA per [facility]  April 2024  - S/P C - PCI RCA per [facility] April 2024 in patient's home country of [facility]   - Abnormal Nuclear Stress Test - lateral-apical ischemia with no scar,EF of 90% with no wall motion abnormality (6.5.20).  Previously medically managed  - [facility] - Dominant RCA with moderate atherosclerotic plaque and 55% stenosis in proximal and mid vessel, LAD with mid atherosclerotic plaque and 30% distal stenosis (2017)  - Lexiscan - positive for lateral-apical ischemia with no scar, EF of 90% with no wall motion abnormality (6.25.20)  - Denies any further CP or SOB since hospitalization  - Continue Plavix 75 mg, Atorvastatin 40 mg (dose increased), bisoprolol 5 mg, spironolactone 12.5 mg, Lasix 40 mg and SL Nitro prn CP  - Metoprolol tartrate 25 mg BID discontinued at recent hospitalization  - Counseled on heart healthy diet and increased activity as tolerated    HFrecEF/NICM  - [facility] Class II  - Recovered EF 50% " Sept. 2022-->EF-50-55% per ECHO Dec. 2019-->EF-40%  - ECHO - EF of 50 to 55% with mild MR, mild TR and RVSP of 26 mmHg (improved from EF 40%) (12/2019)  - Denies SOB or RHOADES. Reports improved bilateral peripheral edema  - RLE -trace edema   - Continue GDMT: Bisoprolol 5 mg, spironolactone 12.5 mg (decreased dose), Farxiga 10 mg  - Losartan discontinued due to hypotension at recent hospitalization  - Continue Lasix 40 mg as directed  - Counseled on low salt diet and s/s of volume overload   - Obtain CMP/CBC today with recent medication changes    Atrial Fibrillation, chronic   - Denies palpitations   - Continue Bisoprolol 5 mg. metoprolol tartrate previously discontinued  - Continued on Eliquis 5 mg twice daily. Denies any adverse bleeding issues.  Patient reportedly did have a GI evaluation while in [facility] and was found to have hemorrhoids.      HLD  - LDL-50 at goal   - Continue Atorvastatin 40 mg daily. Recently increased during  hospitalization   - Advised on heart healthy, low-cholesterol diet and exercise as tolerated    HTN  Hypotension- Patient hypotensive today.  The patient has started reports symptomatic hypotension at home, citing associated lightheadedness and fatigue  - Continue Bisoprolol 5 mg and Lasix 40 mg. Decrease spironolactone to 12.5 mg due to hypotension. Discussed with staff interventional cardiologist  - [place] in 2-3 weeks for BP check check    - Advised on low sodium diet and exercise as tolerated.   - Hypotension precautions provided     VHD- Asymptomatic  - EF- 50%, mild to Moderate IA, mild to moderate MR and mild to moderate TR per ECHO 9.30.22  - Will continue to monitor with serial Echocardiograms.  Due Oct. 2024    Carotid Artery Stenosis  - Patient reportedly completed an Carotid US and was found to minimal stenosis  - Will plan to obtain Carotid ultrasound in 2025 for continued monitoring    CMP/CBC today   ECHO. Due October 2024  Decrease spironolactone to 12.5 mg daily due to  hypotension  Nurse visit in 2-3 weeks for BP/HR check   Follow up in Cardiology Clinic in 3 months after ECHO completed or sooner pending results   Follow up with PCP as directed  Strict ED precautions

## 2024-08-10 ENCOUNTER — OFFICE VISIT (OUTPATIENT)
Dept: URGENT CARE | Facility: CLINIC | Age: 84
End: 2024-08-10
Payer: MEDICAID

## 2024-08-10 ENCOUNTER — HOSPITAL ENCOUNTER (OUTPATIENT)
Dept: RADIOLOGY | Facility: HOSPITAL | Age: 84
Discharge: HOME | End: 2024-08-10
Attending: FAMILY MEDICINE
Payer: MEDICAID

## 2024-08-10 VITALS
OXYGEN SATURATION: 97 % | HEIGHT: 63 IN | BODY MASS INDEX: 32.77 KG/M2 | DIASTOLIC BLOOD PRESSURE: 70 MMHG | HEART RATE: 72 BPM | SYSTOLIC BLOOD PRESSURE: 125 MMHG | TEMPERATURE: 98 F | WEIGHT: 184.94 LBS | RESPIRATION RATE: 20 BRPM

## 2024-08-10 DIAGNOSIS — R61 DIAPHORESIS: ICD-10-CM

## 2024-08-10 DIAGNOSIS — N39.0 URINARY TRACT INFECTION WITHOUT HEMATURIA, SITE UNSPECIFIED: Primary | ICD-10-CM

## 2024-08-10 DIAGNOSIS — R05.1 ACUTE COUGH: ICD-10-CM

## 2024-08-10 DIAGNOSIS — J02.9 SORE THROAT: ICD-10-CM

## 2024-08-10 LAB
BILIRUB UR QL STRIP: NEGATIVE
CTP QC/QA: YES
GLUCOSE SERPL-MCNC: 94 MG/DL (ref 70–110)
GLUCOSE UR QL STRIP: POSITIVE
KETONES UR QL STRIP: NEGATIVE
LEUKOCYTE ESTERASE UR QL STRIP: POSITIVE
MOLECULAR STREP A: NEGATIVE
PH, POC UA: 6
POC BLOOD, URINE: POSITIVE
POC NITRATES, URINE: POSITIVE
PROT UR QL STRIP: NEGATIVE
SP GR UR STRIP: 1.01 (ref 1–1.03)
UROBILINOGEN UR STRIP-ACNC: ABNORMAL (ref 0.1–1.1)

## 2024-08-10 PROCEDURE — 99215 OFFICE O/P EST HI 40 MIN: CPT | Mod: PBBFAC,25 | Performed by: FAMILY MEDICINE

## 2024-08-10 PROCEDURE — 82962 GLUCOSE BLOOD TEST: CPT | Mod: PBBFAC | Performed by: FAMILY MEDICINE

## 2024-08-10 PROCEDURE — 99214 OFFICE O/P EST MOD 30 MIN: CPT | Mod: S$PBB,,, | Performed by: FAMILY MEDICINE

## 2024-08-10 PROCEDURE — 71046 X-RAY EXAM CHEST 2 VIEWS: CPT | Mod: TC

## 2024-08-10 PROCEDURE — 87651 STREP A DNA AMP PROBE: CPT | Mod: PBBFAC | Performed by: FAMILY MEDICINE

## 2024-08-10 PROCEDURE — 87186 SC STD MICRODIL/AGAR DIL: CPT | Performed by: FAMILY MEDICINE

## 2024-08-10 PROCEDURE — 87077 CULTURE AEROBIC IDENTIFY: CPT | Performed by: FAMILY MEDICINE

## 2024-08-10 PROCEDURE — 81003 URINALYSIS AUTO W/O SCOPE: CPT | Mod: PBBFAC | Performed by: FAMILY MEDICINE

## 2024-08-10 RX ORDER — SPIRONOLACTONE AND HYDROCHLOROTHIAZIDE 25; 25 MG/1; MG/1
TABLET ORAL
COMMUNITY
Start: 2024-04-24

## 2024-08-10 RX ORDER — NITROFURANTOIN 25; 75 MG/1; MG/1
100 CAPSULE ORAL 2 TIMES DAILY
Qty: 14 CAPSULE | Refills: 0 | Status: SHIPPED | OUTPATIENT
Start: 2024-08-10 | End: 2024-08-17

## 2024-08-10 RX ORDER — ASPIRIN 81 MG/1
1 TABLET ORAL EVERY MORNING
COMMUNITY

## 2024-08-10 NOTE — PROGRESS NOTES
"Subjective:       Patient ID: Gilmar Vera is a 83 y.o. female.    Vitals:  height is 5' 3" (1.6 m) and weight is 83.9 kg (184 lb 15.5 oz). Her temperature is 98.1 °F (36.7 °C). Her blood pressure is 125/70 and her pulse is 72. Her respiration is 20 and oxygen saturation is 97%.     Chief Complaint: Fatigue (Sore throat, excessive sweating x 1wk  SEEN AT ANOTHER [facility] 1 WK AGO COVID NEGATIVE)    83-year-old female who presents to urgent care with one-week of episodic diaphoresis, possibly subjective fever.  This began with a sore throat and mild cough.  The loose 2 symptoms have improved.  Was seen at an outside facility with negative COVID antigen testing.  She is here with her daughter.  They are concerned about a previous pneumonia that went undiagnosed several years ago.  The patient is having no chest pain or shortness a breath.  No longer has a sore throat.  No rhinorrhea or ear pain.  No swollen glands.  No GI symptoms.  No dysuria or increased frequency.  No abdominal pain.    All other systems are negative    Chart reviewed    Objective:   Physical Exam   Constitutional: She appears well-developed.  Non-toxic appearance. She does not appear ill. No distress.   HENT:   Head: Atraumatic.   Nose: No purulent discharge. Right sinus exhibits no maxillary sinus tenderness and no frontal sinus tenderness. Left sinus exhibits no maxillary sinus tenderness and no frontal sinus tenderness.   Mouth/Throat: Uvula is midline.   Eyes: Right eye exhibits no discharge. Left eye exhibits no discharge. Extraocular movement intact   Neck: Neck supple. No neck rigidity present.   Cardiovascular: Regular rhythm.   Pulmonary/Chest: Effort normal and breath sounds normal. No respiratory distress. She has no wheezes. She has no rhonchi. She has no rales.   Abdominal: Normal appearance. She exhibits no distension. flat abdomen There is no abdominal tenderness. There is no rebound, no guarding, no left CVA tenderness and no right CVA " tenderness.   Lymphadenopathy:     She has no cervical adenopathy.   Neurological: no focal deficit. She is alert.   Skin: Skin is warm, dry and not diaphoretic.   Psychiatric: Her behavior is normal. Mood normal.   Nursing note and vitals reviewed.    Results for orders placed or performed in visit on 08/10/24   POCT Strep A, Molecular   Result Value Ref Range    Molecular Strep A, POC Negative Negative     Acceptable Yes    POCT Urinalysis, Dipstick, Manual, W/O Scope   Result Value Ref Range    POC Blood, Urine Positive (A) Negative    POC Bilirubin, Urine Negative Negative    POC Urobilinogen, Urine 0.2eu./dl 0.1 - 1.1    POC Ketones, Urine Negative Negative    POC Protein, Urine Negative Negative    POC Nitrates, Urine Positive (A) Negative    POC Glucose, Urine Positive (A) Negative    pH, UA 6.0     POC Specific Gravity, Urine 1.015 1.003 - 1.029    POC Leukocytes, Urine Positive (A) Negative   POCT Glucose, Hand-Held Device   Result Value Ref Range    POC Glucose 94 70 - 110 MG/DL     XR CHEST PA AND LATERAL    Result Date: 8/10/2024  EXAMINATION: XR CHEST PA AND LATERAL CLINICAL HISTORY: Acute cough TECHNIQUE: PA and lateral views of the chest were performed. COMPARISON: 12/23/2022 FINDINGS: There are chronic appearing lung changes seen bilaterally. No evidence of acute infiltrate is seen. No mass is seen. No lesion is seen. No pleural effusion is seen. The heart appears normal. The aorta appears normal. The bones and joints show no acute abnormality.     No acute process Electronically signed by: Viraj Sharma Date:    08/10/2024 Time:    12:18     Results for orders placed or performed in visit on 08/10/24   POCT Strep A, Molecular   Result Value Ref Range    Molecular Strep A, POC Negative Negative     Acceptable Yes    POCT Urinalysis, Dipstick, Manual, W/O Scope   Result Value Ref Range    POC Blood, Urine Positive (A) Negative    POC Bilirubin, Urine Negative Negative     POC Urobilinogen, Urine 0.2eu./dl 0.1 - 1.1    POC Ketones, Urine Negative Negative    POC Protein, Urine Negative Negative    POC Nitrates, Urine Positive (A) Negative    POC Glucose, Urine Positive (A) Negative    pH, UA 6.0     POC Specific Gravity, Urine 1.015 1.003 - 1.029    POC Leukocytes, Urine Positive (A) Negative   POCT Glucose, Hand-Held Device   Result Value Ref Range    POC Glucose 94 70 - 110 MG/DL         Assessment:     1. Urinary tract infection without hematuria, site unspecified    2. Sore throat    3. Diaphoresis    4. Acute cough            Plan:   Will treat as UTI.  Nitrofurantoin.  Will send urine for culture and notify if indicated.  Daughter will monitor her fluid intake and her general condition closely.  She will schedule a timely follow-up with her PCP, but return to urgent care if symptoms are not improving or persisting over the next several days, sooner if new or worsening symptoms develop.  ER precautions.  Questions answered.  They both voiced understanding.      Urinary tract infection without hematuria, site unspecified  -     Urine culture  -     nitrofurantoin, macrocrystal-monohydrate, (MACROBID) 100 MG capsule; Take 1 capsule (100 mg total) by mouth 2 (two) times daily. for 7 days  Dispense: 14 capsule; Refill: 0    Sore throat  -     POCT Strep A, Molecular    Diaphoresis  -     POCT Urinalysis, Dipstick, Manual, W/O Scope  -     POCT Glucose, Hand-Held Device    Acute cough  -     XR CHEST PA AND LATERAL; Future; Expected date: 08/10/2024        Please note: This chart was completed via voice to text dictation. It may contain typographical/word recognition errors. If there are any questions, please contact the provider for final clarification.

## 2024-08-12 ENCOUNTER — TELEPHONE (OUTPATIENT)
Dept: URGENT CARE | Facility: CLINIC | Age: 84
End: 2024-08-12
Payer: MEDICAID

## 2024-08-12 LAB — BACTERIA UR CULT: ABNORMAL

## 2024-08-12 NOTE — PROGRESS NOTES
Please follow up with MsKrys Chuy if symptoms are improving. If so she may continue with entirety of Macrobid. Macrobid should be sensitive according to susceptibility report.

## 2024-08-12 NOTE — TELEPHONE ENCOUNTER
----- Message from TERESA Cross sent at 8/12/2024  9:19 AM CDT -----  Please follow up with Ms. Vera if symptoms are improving. If so she may continue with entirety of Macrobid. Macrobid should be sensitive according to susceptibility report.

## 2024-08-13 ENCOUNTER — TELEPHONE (OUTPATIENT)
Dept: URGENT CARE | Facility: CLINIC | Age: 84
End: 2024-08-13
Payer: MEDICAID

## 2024-08-13 NOTE — TELEPHONE ENCOUNTER
Spoke with patient's daughter. Name and  verified. Daughter states she is feeling 90% better. Advised her to continue all medication until gone, she voiced understanding.

## 2024-09-03 ENCOUNTER — CLINICAL SUPPORT (OUTPATIENT)
Dept: CARDIOLOGY | Facility: CLINIC | Age: 84
End: 2024-09-03
Payer: MEDICAID

## 2024-09-03 VITALS
OXYGEN SATURATION: 95 % | DIASTOLIC BLOOD PRESSURE: 80 MMHG | WEIGHT: 186.63 LBS | BODY MASS INDEX: 33.05 KG/M2 | SYSTOLIC BLOOD PRESSURE: 112 MMHG | HEART RATE: 68 BPM | TEMPERATURE: 98 F

## 2024-09-03 DIAGNOSIS — I25.10 CORONARY ARTERY DISEASE INVOLVING NATIVE CORONARY ARTERY OF NATIVE HEART WITHOUT ANGINA PECTORIS: ICD-10-CM

## 2024-09-03 DIAGNOSIS — I95.9 HYPOTENSION, UNSPECIFIED HYPOTENSION TYPE: ICD-10-CM

## 2024-09-03 DIAGNOSIS — I95.9 HYPOTENSION, UNSPECIFIED HYPOTENSION TYPE: Primary | ICD-10-CM

## 2024-09-03 DIAGNOSIS — I50.20 HEART FAILURE WITH REDUCED EJECTION FRACTION: Primary | ICD-10-CM

## 2024-09-03 PROCEDURE — 99211 OFF/OP EST MAY X REQ PHY/QHP: CPT | Mod: S$PBB,,, | Performed by: NURSE PRACTITIONER

## 2024-09-03 PROCEDURE — 99214 OFFICE O/P EST MOD 30 MIN: CPT | Mod: PBBFAC

## 2024-09-03 RX ORDER — BISOPROLOL FUMARATE 5 MG/1
5 TABLET, FILM COATED ORAL DAILY
Qty: 30 TABLET | Refills: 11 | Status: SHIPPED | OUTPATIENT
Start: 2024-09-03 | End: 2025-09-03

## 2024-09-03 RX ORDER — DICLOFENAC SODIUM 10 MG/G
2 GEL TOPICAL
COMMUNITY
Start: 2023-09-15

## 2024-09-03 RX ORDER — ATORVASTATIN CALCIUM 40 MG/1
TABLET, FILM COATED ORAL
COMMUNITY
Start: 2024-04-24

## 2024-09-03 NOTE — PROGRESS NOTES
Patient saw for nurse visit. Reports symptoms have resolved. She was hypotensive last visit. Home bp log obtained. BP in clinic 112/80 hr 68. Presented to BART Donald NP who advised pt to continue current medications and report any change in condition to clinic. Hydration encouraged.

## 2024-09-16 ENCOUNTER — LAB VISIT (OUTPATIENT)
Dept: LAB | Facility: HOSPITAL | Age: 84
End: 2024-09-16
Attending: FAMILY MEDICINE
Payer: MEDICAID

## 2024-09-16 ENCOUNTER — OFFICE VISIT (OUTPATIENT)
Dept: FAMILY MEDICINE | Facility: CLINIC | Age: 84
End: 2024-09-16

## 2024-09-16 VITALS
WEIGHT: 187 LBS | SYSTOLIC BLOOD PRESSURE: 96 MMHG | HEART RATE: 64 BPM | OXYGEN SATURATION: 95 % | BODY MASS INDEX: 33.13 KG/M2 | HEIGHT: 63 IN | RESPIRATION RATE: 18 BRPM | TEMPERATURE: 98 F | DIASTOLIC BLOOD PRESSURE: 60 MMHG

## 2024-09-16 DIAGNOSIS — I95.9 HYPOTENSION, UNSPECIFIED HYPOTENSION TYPE: ICD-10-CM

## 2024-09-16 DIAGNOSIS — E79.0 HYPERURICEMIA: ICD-10-CM

## 2024-09-16 DIAGNOSIS — R79.89 ELEVATED SERUM CREATININE: ICD-10-CM

## 2024-09-16 DIAGNOSIS — K21.9 GASTROESOPHAGEAL REFLUX DISEASE, UNSPECIFIED WHETHER ESOPHAGITIS PRESENT: ICD-10-CM

## 2024-09-16 DIAGNOSIS — K59.09 CHRONIC CONSTIPATION: Primary | ICD-10-CM

## 2024-09-16 DIAGNOSIS — Z86.2 HISTORY OF ANEMIA: ICD-10-CM

## 2024-09-16 LAB
ANION GAP SERPL CALC-SCNC: 8 MEQ/L
BACTERIA #/AREA URNS AUTO: ABNORMAL /HPF
BASOPHILS # BLD AUTO: 0.05 X10(3)/MCL
BASOPHILS NFR BLD AUTO: 0.6 %
BILIRUB UR QL STRIP.AUTO: NEGATIVE
BUN SERPL-MCNC: 20.5 MG/DL (ref 9.8–20.1)
CALCIUM SERPL-MCNC: 10.8 MG/DL (ref 8.4–10.2)
CHLORIDE SERPL-SCNC: 108 MMOL/L (ref 98–107)
CLARITY UR: CLEAR
CO2 SERPL-SCNC: 23 MMOL/L (ref 23–31)
COLOR UR AUTO: ABNORMAL
CREAT SERPL-MCNC: 0.92 MG/DL (ref 0.55–1.02)
CREAT/UREA NIT SERPL: 22
EOSINOPHIL # BLD AUTO: 0.11 X10(3)/MCL (ref 0–0.9)
EOSINOPHIL NFR BLD AUTO: 1.4 %
ERYTHROCYTE [DISTWIDTH] IN BLOOD BY AUTOMATED COUNT: 14.8 % (ref 11.5–17)
FERRITIN SERPL-MCNC: 10.29 NG/ML (ref 4.63–204)
GFR SERPLBLD CREATININE-BSD FMLA CKD-EPI: >60 ML/MIN/1.73/M2
GLUCOSE SERPL-MCNC: 112 MG/DL (ref 82–115)
GLUCOSE UR QL STRIP: ABNORMAL
HCT VFR BLD AUTO: 39.6 % (ref 37–47)
HGB BLD-MCNC: 12.6 G/DL (ref 12–16)
HGB UR QL STRIP: NEGATIVE
HYALINE CASTS #/AREA URNS LPF: ABNORMAL /LPF
IMM GRANULOCYTES # BLD AUTO: 0.03 X10(3)/MCL (ref 0–0.04)
IMM GRANULOCYTES NFR BLD AUTO: 0.4 %
IRON SATN MFR SERPL: 8 % (ref 20–50)
IRON SERPL-MCNC: 27 UG/DL (ref 50–170)
KETONES UR QL STRIP: NEGATIVE
LEUKOCYTE ESTERASE UR QL STRIP: NEGATIVE
LYMPHOCYTES # BLD AUTO: 1.78 X10(3)/MCL (ref 0.6–4.6)
LYMPHOCYTES NFR BLD AUTO: 22.5 %
MCH RBC QN AUTO: 27.3 PG (ref 27–31)
MCHC RBC AUTO-ENTMCNC: 31.8 G/DL (ref 33–36)
MCV RBC AUTO: 85.7 FL (ref 80–94)
MONOCYTES # BLD AUTO: 0.63 X10(3)/MCL (ref 0.1–1.3)
MONOCYTES NFR BLD AUTO: 8 %
NEUTROPHILS # BLD AUTO: 5.31 X10(3)/MCL (ref 2.1–9.2)
NEUTROPHILS NFR BLD AUTO: 67.1 %
NITRITE UR QL STRIP: NEGATIVE
NRBC BLD AUTO-RTO: 0 %
PH UR STRIP: 5.5 [PH]
PLATELET # BLD AUTO: 213 X10(3)/MCL (ref 130–400)
PMV BLD AUTO: 9.3 FL (ref 7.4–10.4)
POTASSIUM SERPL-SCNC: 4.4 MMOL/L (ref 3.5–5.1)
PROT UR QL STRIP: NEGATIVE
RBC # BLD AUTO: 4.62 X10(6)/MCL (ref 4.2–5.4)
RBC #/AREA URNS AUTO: ABNORMAL /HPF
SODIUM SERPL-SCNC: 139 MMOL/L (ref 136–145)
SP GR UR STRIP.AUTO: 1.02 (ref 1–1.03)
SQUAMOUS #/AREA URNS LPF: ABNORMAL /HPF
TIBC SERPL-MCNC: 329 UG/DL (ref 70–310)
TIBC SERPL-MCNC: 356 UG/DL (ref 250–450)
TRANSFERRIN SERPL-MCNC: 343 MG/DL
URATE SERPL-MCNC: 6.5 MG/DL (ref 2.6–6)
UROBILINOGEN UR STRIP-ACNC: NORMAL
WBC # BLD AUTO: 7.91 X10(3)/MCL (ref 4.5–11.5)
WBC #/AREA URNS AUTO: ABNORMAL /HPF

## 2024-09-16 PROCEDURE — 36415 COLL VENOUS BLD VENIPUNCTURE: CPT

## 2024-09-16 PROCEDURE — 83550 IRON BINDING TEST: CPT

## 2024-09-16 PROCEDURE — 83540 ASSAY OF IRON: CPT

## 2024-09-16 PROCEDURE — 81001 URINALYSIS AUTO W/SCOPE: CPT | Performed by: FAMILY MEDICINE

## 2024-09-16 PROCEDURE — 84550 ASSAY OF BLOOD/URIC ACID: CPT

## 2024-09-16 PROCEDURE — 82728 ASSAY OF FERRITIN: CPT

## 2024-09-16 PROCEDURE — 85025 COMPLETE CBC W/AUTO DIFF WBC: CPT

## 2024-09-16 PROCEDURE — 81015 MICROSCOPIC EXAM OF URINE: CPT | Performed by: FAMILY MEDICINE

## 2024-09-16 PROCEDURE — 80048 BASIC METABOLIC PNL TOTAL CA: CPT

## 2024-09-16 RX ORDER — PANTOPRAZOLE SODIUM 40 MG/1
40 TABLET, DELAYED RELEASE ORAL DAILY
Qty: 90 TABLET | Refills: 1 | Status: SHIPPED | OUTPATIENT
Start: 2024-09-16

## 2024-09-16 NOTE — PROGRESS NOTES
"Patient Name: Gilmar Vera   : 1940  MRN: [id number]     SUBJECTIVE:  Gilmar Vera is a 83 y.o. female here for Follow-up (The patient and her daughter would like to discuss constipation issues and also her anemia. )  .    HPI  Here for routine follow up.  Accompanied by her daughter who translates most of the time.  Feels well, no concerns except for constipation.  Saw cardiology about a month and half ago and because of hypotension, she was recommended to decrease spironolactone to 12.5 mg daily.  Blood pressure today still low though 96/60.  Per their note, "seems like patient also had a stent placed of RCA for a reported 99% stenosis when she went to her home country [facility].Continue Plavix 75 mg, Atorvastatin 40 mg (dose increased), bisoprolol 5 mg, spironolactone 12.5 mg, Lasix 40 mg daily and SL Nitro prn CP. Metoprolol tartrate 25 mg BID discontinued at recent hospitalization  Losartan discontinued due to hypotension at recent hospitalization   Continued on Eliquis 5 mg twice daily. Denies any adverse bleeding issues.  Patient reportedly did have a GI evaluation while in [facility] and was found to have hemorrhoids.    Per her daughter, she stopped taking the iron pills for 2 months now. She was placed on med for constipation and that helped a lot. Was wondering If we can refill prucalopride. Was working great, took it for 2 months. Last BM yesterday and hard stools bc she has not used the med. Was taking prune juice and otc meds and still hard stools.  She had a UTI last month was treated with Macrobid  Was also told her uric acid was high and was placed on allopurinol. Last dose last month.     Already had pneumonia shot       ALLERGIES:   Review of patient's allergies indicates:   Allergen Reactions    Ferric derisomaltose Shortness Of Breath         ROS:  Review of Systems   Constitutional:  Negative for chills, fever and weight loss.   HENT:  Negative for congestion.    Respiratory:  Negative " "for cough and shortness of breath.    Cardiovascular:  Negative for chest pain, palpitations and leg swelling.   Gastrointestinal:  Positive for constipation. Negative for abdominal pain, blood in stool, diarrhea, melena, nausea and vomiting.   Genitourinary:  Negative for dysuria and hematuria.   Neurological:  Negative for dizziness and headaches.   Psychiatric/Behavioral:  Negative for depression. The patient is not nervous/anxious.          OBJECTIVE:  Vital signs  Vitals:    09/16/24 0820   BP: 96/60   Pulse: 64   Resp: 18   Temp: 97.9 °F (36.6 °C)   TempSrc: Oral   SpO2: 95%   Weight: 84.8 kg (187 lb)   Height: 5' 3" (1.6 m)      Body mass index is 33.13 kg/m².    PHYSICAL EXAM:   Physical Exam  Vitals reviewed.   Constitutional:       General: She is not in acute distress.     Appearance: Normal appearance. She is not ill-appearing.   HENT:      Head: Normocephalic and atraumatic.      Right Ear: External ear normal.      Left Ear: External ear normal.      Nose: Nose normal. No rhinorrhea.      Mouth/Throat:      Mouth: Mucous membranes are moist.   Eyes:      General: No scleral icterus.        Right eye: No discharge.         Left eye: No discharge.      Conjunctiva/sclera: Conjunctivae normal.      Pupils: Pupils are equal, round, and reactive to light.   Cardiovascular:      Rate and Rhythm: Normal rate and regular rhythm.   Pulmonary:      Effort: Pulmonary effort is normal. No respiratory distress.      Breath sounds: No wheezing, rhonchi or rales.   Abdominal:      General: There is no distension.      Tenderness: There is no abdominal tenderness.   Musculoskeletal:      Cervical back: Normal range of motion and neck supple. No rigidity or tenderness.      Right lower leg: No edema.      Left lower leg: No edema.   Skin:     General: Skin is warm.      Findings: No rash.   Neurological:      General: No focal deficit present.      Mental Status: She is alert and oriented to person, place, and time. "   Psychiatric:         Mood and Affect: Mood normal.         Behavior: Behavior normal.          ASSESSMENT/PLAN:  1. Chronic constipation  Has failed OTC measures.  Okay to resume prucalopride.  Had good response to it and no diarrhea.  Already had scopes done.  -     prucalopride (MOTEGRITY) 1 mg tablet; Take 1 tablet (1 mg total) by mouth once daily.  Dispense: 90 tablet; Refill: 0    2. History of anemia  Recheck iron studies and ferritin.  Depending on results will advise patient if she needs to resume iron pills.  -     Iron and TIBC; Future; Expected date: 09/16/2024  -     Ferritin; Future; Expected date: 09/16/2024  -     CBC Auto Differential; Future; Expected date: 09/16/2024    3. Hypotension, unspecified hypotension type  Patient asymptomatic.  Check UA for any infection.  Advised patient to hold spironolactone and update Cardiology about the blood pressure still being low despite decreasing the spironolactone.  Check CBC, BMP.  -     Urinalysis, Reflex to Urine Culture    4. Elevated serum creatinine  Advised patient to keep herself well hydrated, avoid NSAIDs.  Check BMP.  -     Basic Metabolic Panel; Future; Expected date: 09/16/2024    5. Gastroesophageal reflux disease, unspecified whether esophagitis present  Continue with Protonix.  Well-controlled.  -     pantoprazole (PROTONIX) 40 MG tablet; Take 1 tablet (40 mg total) by mouth once daily.  Dispense: 90 tablet; Refill: 1    6. Hyperuricemia  During the hospitalization [facility] was found to have high uric acid.  Recheck.  Has been off of allopurinol for a month now.   -     Uric Acid; Future; Expected date: 09/16/2024             Previous medical history/lab work/radiology reviewed and considered during medical management decisions.   Medication list reviewed and medication reconciliation performed.  Patient was provided  and care about his/her current diagnosis (es) and medications including risk/benefit and side effects/adverse events,  over the counter medication uses/doses, home self-care and contact precautions,  and red flags and indications for when to seek immediate medical attention.   Patient was advised to continue compliance with current medication list and medical recommendations.  Recommended/ Advised continued compliance with recommended eating habits/ diets for medical conditions and exercise 150 minutes/ week (if possible) for medical condition (s).        RESULTS:  Recent Results (from the past 6 weeks)   Basic Metabolic Panel    Collection Time: 09/16/24 10:16 AM   Result Value Ref Range    Sodium 139 136 - 145 mmol/L    Potassium 4.4 3.5 - 5.1 mmol/L    Chloride 108 (H) 98 - 107 mmol/L    CO2 23 23 - 31 mmol/L    Glucose 112 82 - 115 mg/dL    Blood Urea Nitrogen 20.5 (H) 9.8 - 20.1 mg/dL    Creatinine 0.92 0.55 - 1.02 mg/dL    BUN/Creatinine Ratio 22     Calcium 10.8 (H) 8.4 - 10.2 mg/dL    Anion Gap 8.0 mEq/L    eGFR >60 mL/min/1.73/m2   Iron and TIBC    Collection Time: 09/16/24 10:16 AM   Result Value Ref Range    Iron Binding Capacity Unsaturated 329 (H) 70 - 310 ug/dL    Iron Level 27 (L) 50 - 170 ug/dL    Transferrin 343 mg/dL    Iron Binding Capacity Total 356 250 - 450 ug/dL    Iron Saturation 8 (L) 20 - 50 %   Ferritin    Collection Time: 09/16/24 10:16 AM   Result Value Ref Range    Ferritin Level 10.29 4.63 - 204.00 ng/mL   Uric Acid    Collection Time: 09/16/24 10:16 AM   Result Value Ref Range    Uric Acid 6.5 (H) 2.6 - 6.0 mg/dL   CBC with Differential    Collection Time: 09/16/24 10:16 AM   Result Value Ref Range    WBC 7.91 4.50 - 11.50 x10(3)/mcL    RBC 4.62 4.20 - 5.40 x10(6)/mcL    Hgb 12.6 12.0 - 16.0 g/dL    Hct 39.6 37.0 - 47.0 %    MCV 85.7 80.0 - 94.0 fL    MCH 27.3 27.0 - 31.0 pg    MCHC 31.8 (L) 33.0 - 36.0 g/dL    RDW 14.8 11.5 - 17.0 %    Platelet 213 130 - 400 x10(3)/mcL    MPV 9.3 7.4 - 10.4 fL    Neut % 67.1 %    Lymph % 22.5 %    Mono % 8.0 %    Eos % 1.4 %    Basophil % 0.6 %    Lymph # 1.78 0.6 -  4.6 x10(3)/mcL    Neut # 5.31 2.1 - 9.2 x10(3)/mcL    Mono # 0.63 0.1 - 1.3 x10(3)/mcL    Eos # 0.11 0 - 0.9 x10(3)/mcL    Baso # 0.05 <=0.2 x10(3)/mcL    IG# 0.03 0 - 0.04 x10(3)/mcL    IG% 0.4 %    NRBC% 0.0 %   Urinalysis, Reflex to Urine Culture    Collection Time: 09/16/24 11:03 AM    Specimen: Urine   Result Value Ref Range    Color, UA Light-Yellow Yellow, Light-Yellow, Dark Yellow, Ruchi, Straw    Appearance, UA Clear Clear    Specific Gravity, UA 1.019 1.005 - 1.030    pH, UA 5.5 5.0 - 8.5    Protein, UA Negative Negative    Glucose, UA 4+ (A) Negative, Normal    Ketones, UA Negative Negative    Blood, UA Negative Negative    Bilirubin, UA Negative Negative    Urobilinogen, UA Normal 0.2, 1.0, Normal    Nitrites, UA Negative Negative    Leukocyte Esterase, UA Negative Negative    RBC, UA 0-5 None Seen, 0-2, 3-5, 0-5 /HPF    WBC, UA 0-5 None Seen, 0-2, 3-5, 0-5 /HPF    Bacteria, UA Trace (A) None Seen /HPF    Squamous Epithelial Cells, UA Moderate (A) None Seen /HPF    Hyaline Casts, UA None Seen None Seen /lpf         Follow Up:  Follow up in about 3 months (around 12/16/2024) for htn, constipation,. extended visit please, daughter translates for her.     [unfilled]    This note was created with the assistance of a voice recognition software or phone dictation. There may be transcription errors as a result of using this technology however minimal. Effort has been made to assure accuracy of transcription but any obvious errors or omissions should be clarified with the author of the document

## 2024-10-08 ENCOUNTER — PATIENT MESSAGE (OUTPATIENT)
Dept: FAMILY MEDICINE | Facility: CLINIC | Age: 84
End: 2024-10-08
Payer: MEDICAID

## 2024-10-10 DIAGNOSIS — E79.0 HYPERURICEMIA: ICD-10-CM

## 2024-10-10 DIAGNOSIS — E83.52 HYPERCALCEMIA: Primary | ICD-10-CM

## 2024-11-12 ENCOUNTER — HOSPITAL ENCOUNTER (OUTPATIENT)
Dept: CARDIOLOGY | Facility: HOSPITAL | Age: 84
Discharge: HOME | End: 2024-11-12
Attending: NURSE PRACTITIONER
Payer: MEDICAID

## 2024-11-12 VITALS
WEIGHT: 187 LBS | HEIGHT: 63 IN | SYSTOLIC BLOOD PRESSURE: 114 MMHG | DIASTOLIC BLOOD PRESSURE: 67 MMHG | BODY MASS INDEX: 33.13 KG/M2

## 2024-11-12 DIAGNOSIS — I38 VHD (VALVULAR HEART DISEASE): ICD-10-CM

## 2024-11-12 PROCEDURE — 93306 TTE W/DOPPLER COMPLETE: CPT

## 2024-11-13 ENCOUNTER — TELEPHONE (OUTPATIENT)
Dept: CARDIOLOGY | Facility: CLINIC | Age: 84
End: 2024-11-13
Payer: MEDICAID

## 2024-11-13 LAB
APICAL FOUR CHAMBER EJECTION FRACTION: 64 %
APICAL TWO CHAMBER EJECTION FRACTION: 56 %
AV INDEX (PROSTH): 0.81
AV MEAN GRADIENT: 2.8 MMHG
AV PEAK GRADIENT: 4 MMHG
AV VALVE AREA BY VELOCITY RATIO: 2.2 CM²
AV VALVE AREA: 2.5 CM²
AV VELOCITY RATIO: 0.7
BSA FOR ECHO PROCEDURE: 1.94 M2
CV ECHO LV RWT: 0.43 CM
DOP CALC AO PEAK VEL: 1 M/S
DOP CALC AO VTI: 21.1 CM
DOP CALC LVOT AREA: 3.1 CM2
DOP CALC LVOT DIAMETER: 2 CM
DOP CALC LVOT PEAK VEL: 0.7 M/S
DOP CALC LVOT STROKE VOLUME: 53.4 CM3
DOP CALC MV VTI: 22 CM
DOP CALCLVOT PEAK VEL VTI: 17 CM
E WAVE DECELERATION TIME: 138.4 MSEC
E/A RATIO: 2.58
ECHO LV POSTERIOR WALL: 1 CM (ref 0.6–1.1)
FRACTIONAL SHORTENING: 37 % (ref 28–44)
HR MV ECHO: 85 BPM
INTERVENTRICULAR SEPTUM: 0.9 CM (ref 0.6–1.1)
IVC DIAMETER: 2 CM
LEFT ATRIUM AREA SYSTOLIC (APICAL 2 CHAMBER): 16.36 CM2
LEFT ATRIUM AREA SYSTOLIC (APICAL 4 CHAMBER): 17.42 CM2
LEFT ATRIUM SIZE: 4.37 CM
LEFT ATRIUM VOLUME INDEX MOD: 23.4 ML/M2
LEFT ATRIUM VOLUME MOD: 44.02 ML
LEFT INTERNAL DIMENSION IN SYSTOLE: 2.9 CM (ref 2.1–4)
LEFT VENTRICLE DIASTOLIC VOLUME INDEX: 52.7 ML/M2
LEFT VENTRICLE DIASTOLIC VOLUME: 99.07 ML
LEFT VENTRICLE END DIASTOLIC VOLUME APICAL 2 CHAMBER: 40.92 ML
LEFT VENTRICLE END DIASTOLIC VOLUME APICAL 4 CHAMBER: 27.37 ML
LEFT VENTRICLE END SYSTOLIC VOLUME APICAL 2 CHAMBER: 41.94 ML
LEFT VENTRICLE END SYSTOLIC VOLUME APICAL 4 CHAMBER: 44.17 ML
LEFT VENTRICLE MASS INDEX: 78.8 G/M2
LEFT VENTRICLE SYSTOLIC VOLUME INDEX: 17.6 ML/M2
LEFT VENTRICLE SYSTOLIC VOLUME: 33.01 ML
LEFT VENTRICULAR INTERNAL DIMENSION IN DIASTOLE: 4.6 CM (ref 3.5–6)
LEFT VENTRICULAR MASS: 148.1 G
LVED V (TEICH): 99.07 ML
LVES V (TEICH): 33.01 ML
LVOT MG: 1.26 MMHG
LVOT MV: 0.54 CM/S
MV MEAN GRADIENT: 2 MMHG
MV PEAK A VEL: 0.45 M/S
MV PEAK E VEL: 1.16 M/S
MV PEAK GRADIENT: 8 MMHG
MV VALVE AREA BY CONTINUITY EQUATION: 2.43 CM2
OHS LV EJECTION FRACTION SIMPSONS BIPLANE MOD: 60 %
PISA MRMAX VEL: 4.96 M/S
PISA TR MAX VEL: 3.28 M/S
RA MAJOR: 5.06 CM
RA PRESSURE ESTIMATED: 3 MMHG
RV TB RVSP: 6 MMHG
TR MAX PG: 43 MMHG
TRICUSPID ANNULAR PLANE SYSTOLIC EXCURSION: 1.75 CM
TV REST PULMONARY ARTERY PRESSURE: 46 MMHG
Z-SCORE OF LEFT VENTRICULAR DIMENSION IN END DIASTOLE: -1.32
Z-SCORE OF LEFT VENTRICULAR DIMENSION IN END SYSTOLE: -0.87

## 2024-11-13 NOTE — TELEPHONE ENCOUNTER
Discussed results of Echocardiogram with the patient's daughter.  All questions answered.  Verbalizes understanding.

## 2024-12-03 DIAGNOSIS — I25.10 CORONARY ARTERY DISEASE INVOLVING NATIVE CORONARY ARTERY OF NATIVE HEART WITHOUT ANGINA PECTORIS: Primary | ICD-10-CM

## 2024-12-03 RX ORDER — ATORVASTATIN CALCIUM 40 MG/1
40 TABLET, FILM COATED ORAL NIGHTLY
Qty: 90 TABLET | Refills: 3 | Status: SHIPPED | OUTPATIENT
Start: 2024-12-03 | End: 2025-12-03

## 2024-12-12 ENCOUNTER — HOSPITAL ENCOUNTER (EMERGENCY)
Facility: HOSPITAL | Age: 84
Discharge: HOME | End: 2024-12-12
Attending: STUDENT IN AN ORGANIZED HEALTH CARE EDUCATION/TRAINING PROGRAM
Payer: MEDICAID

## 2024-12-12 ENCOUNTER — OFFICE VISIT (OUTPATIENT)
Dept: CARDIOLOGY | Facility: CLINIC | Age: 84
End: 2024-12-12
Payer: MEDICAID

## 2024-12-12 VITALS
HEART RATE: 80 BPM | TEMPERATURE: 97 F | WEIGHT: 183.38 LBS | RESPIRATION RATE: 18 BRPM | OXYGEN SATURATION: 96 % | DIASTOLIC BLOOD PRESSURE: 72 MMHG | BODY MASS INDEX: 32.49 KG/M2 | SYSTOLIC BLOOD PRESSURE: 104 MMHG | HEIGHT: 63 IN

## 2024-12-12 VITALS
RESPIRATION RATE: 16 BRPM | TEMPERATURE: 98 F | OXYGEN SATURATION: 99 % | WEIGHT: 183 LBS | HEIGHT: 63 IN | DIASTOLIC BLOOD PRESSURE: 71 MMHG | SYSTOLIC BLOOD PRESSURE: 111 MMHG | BODY MASS INDEX: 32.43 KG/M2 | HEART RATE: 60 BPM

## 2024-12-12 DIAGNOSIS — I48.20 CHRONIC ATRIAL FIBRILLATION: ICD-10-CM

## 2024-12-12 DIAGNOSIS — I95.9 HYPOTENSION, UNSPECIFIED HYPOTENSION TYPE: ICD-10-CM

## 2024-12-12 DIAGNOSIS — R06.09 DOE (DYSPNEA ON EXERTION): ICD-10-CM

## 2024-12-12 DIAGNOSIS — D64.9 ANEMIA, UNSPECIFIED TYPE: ICD-10-CM

## 2024-12-12 DIAGNOSIS — E78.2 MIXED HYPERLIPIDEMIA: ICD-10-CM

## 2024-12-12 DIAGNOSIS — I95.9 HYPOTENSION: Primary | ICD-10-CM

## 2024-12-12 DIAGNOSIS — I25.10 CORONARY ARTERY DISEASE INVOLVING NATIVE CORONARY ARTERY OF NATIVE HEART WITHOUT ANGINA PECTORIS: Primary | ICD-10-CM

## 2024-12-12 DIAGNOSIS — I42.8 NICM (NONISCHEMIC CARDIOMYOPATHY): ICD-10-CM

## 2024-12-12 DIAGNOSIS — R60.0 PERIPHERAL EDEMA: ICD-10-CM

## 2024-12-12 PROBLEM — I50.20 HEART FAILURE WITH REDUCED EJECTION FRACTION: Status: RESOLVED | Noted: 2022-07-28 | Resolved: 2024-12-12

## 2024-12-12 PROBLEM — I45.9 CARDIAC CONDUCTION DISORDER: Status: RESOLVED | Noted: 2022-12-23 | Resolved: 2024-12-12

## 2024-12-12 LAB
ALBUMIN SERPL-MCNC: 4.2 G/DL (ref 3.4–4.8)
ALBUMIN/GLOB SERPL: 1.3 RATIO (ref 1.1–2)
ALP SERPL-CCNC: 77 UNIT/L (ref 40–150)
ALT SERPL-CCNC: 16 UNIT/L (ref 0–55)
ANION GAP SERPL CALC-SCNC: 9 MEQ/L
AST SERPL-CCNC: 41 UNIT/L (ref 5–34)
BACTERIA #/AREA URNS AUTO: ABNORMAL /HPF
BASOPHILS # BLD AUTO: 0.05 X10(3)/MCL
BASOPHILS NFR BLD AUTO: 0.8 %
BILIRUB SERPL-MCNC: 0.7 MG/DL
BILIRUB UR QL STRIP.AUTO: NEGATIVE
BUN SERPL-MCNC: 16.6 MG/DL (ref 9.8–20.1)
CALCIUM SERPL-MCNC: 10.7 MG/DL (ref 8.4–10.2)
CHLORIDE SERPL-SCNC: 107 MMOL/L (ref 98–107)
CLARITY UR: CLEAR
CO2 SERPL-SCNC: 24 MMOL/L (ref 23–31)
COLOR UR AUTO: YELLOW
CREAT SERPL-MCNC: 0.97 MG/DL (ref 0.55–1.02)
CREAT/UREA NIT SERPL: 17
EOSINOPHIL # BLD AUTO: 0.15 X10(3)/MCL (ref 0–0.9)
EOSINOPHIL NFR BLD AUTO: 2.3 %
ERYTHROCYTE [DISTWIDTH] IN BLOOD BY AUTOMATED COUNT: 15.9 % (ref 11.5–17)
GFR SERPLBLD CREATININE-BSD FMLA CKD-EPI: 58 ML/MIN/1.73/M2
GLOBULIN SER-MCNC: 3.2 GM/DL (ref 2.4–3.5)
GLUCOSE SERPL-MCNC: 114 MG/DL (ref 82–115)
GLUCOSE UR QL STRIP: ABNORMAL
GROUP & RH: NORMAL
HCT VFR BLD AUTO: 43.5 % (ref 37–47)
HGB BLD-MCNC: 14.4 G/DL (ref 12–16)
HGB UR QL STRIP: NEGATIVE
HYALINE CASTS #/AREA URNS LPF: ABNORMAL /LPF
IMM GRANULOCYTES # BLD AUTO: 0.02 X10(3)/MCL (ref 0–0.04)
IMM GRANULOCYTES NFR BLD AUTO: 0.3 %
INDIRECT COOMBS: NORMAL
INR PPP: 1.2
KETONES UR QL STRIP: NEGATIVE
LACTATE SERPL-SCNC: 1.2 MMOL/L (ref 0.5–2.2)
LEUKOCYTE ESTERASE UR QL STRIP: 75
LYMPHOCYTES # BLD AUTO: 1.92 X10(3)/MCL (ref 0.6–4.6)
LYMPHOCYTES NFR BLD AUTO: 29.7 %
MAGNESIUM SERPL-MCNC: 2.5 MG/DL (ref 1.6–2.6)
MCH RBC QN AUTO: 28 PG (ref 27–31)
MCHC RBC AUTO-ENTMCNC: 33.1 G/DL (ref 33–36)
MCV RBC AUTO: 84.6 FL (ref 80–94)
MONOCYTES # BLD AUTO: 0.6 X10(3)/MCL (ref 0.1–1.3)
MONOCYTES NFR BLD AUTO: 9.3 %
MUCOUS THREADS URNS QL MICRO: ABNORMAL /LPF
NEUTROPHILS # BLD AUTO: 3.73 X10(3)/MCL (ref 2.1–9.2)
NEUTROPHILS NFR BLD AUTO: 57.6 %
NITRITE UR QL STRIP: ABNORMAL
NRBC BLD AUTO-RTO: 0 %
OHS QRS DURATION: 84 MS
OHS QTC CALCULATION: 425 MS
PH UR STRIP: 5.5 [PH]
PLATELET # BLD AUTO: 176 X10(3)/MCL (ref 130–400)
PMV BLD AUTO: 9.3 FL (ref 7.4–10.4)
POTASSIUM SERPL-SCNC: 3.8 MMOL/L (ref 3.5–5.1)
PROT SERPL-MCNC: 7.4 GM/DL (ref 5.8–7.6)
PROT UR QL STRIP: NEGATIVE
PROTHROMBIN TIME: 15.5 SECONDS (ref 11.4–14)
RBC # BLD AUTO: 5.14 X10(6)/MCL (ref 4.2–5.4)
RBC #/AREA URNS AUTO: ABNORMAL /HPF
SODIUM SERPL-SCNC: 140 MMOL/L (ref 136–145)
SP GR UR STRIP.AUTO: 1.02 (ref 1–1.03)
SPECIMEN OUTDATE: NORMAL
SQUAMOUS #/AREA URNS LPF: ABNORMAL /HPF
TROPONIN I SERPL-MCNC: 0.02 NG/ML (ref 0–0.04)
UROBILINOGEN UR STRIP-ACNC: NORMAL
WBC # BLD AUTO: 6.47 X10(3)/MCL (ref 4.5–11.5)
WBC #/AREA URNS AUTO: ABNORMAL /HPF
WBC CLUMPS UR QL AUTO: ABNORMAL

## 2024-12-12 PROCEDURE — 84484 ASSAY OF TROPONIN QUANT: CPT | Performed by: STUDENT IN AN ORGANIZED HEALTH CARE EDUCATION/TRAINING PROGRAM

## 2024-12-12 PROCEDURE — 83605 ASSAY OF LACTIC ACID: CPT | Performed by: STUDENT IN AN ORGANIZED HEALTH CARE EDUCATION/TRAINING PROGRAM

## 2024-12-12 PROCEDURE — 96360 HYDRATION IV INFUSION INIT: CPT

## 2024-12-12 PROCEDURE — 87077 CULTURE AEROBIC IDENTIFY: CPT | Performed by: STUDENT IN AN ORGANIZED HEALTH CARE EDUCATION/TRAINING PROGRAM

## 2024-12-12 PROCEDURE — 85025 COMPLETE CBC W/AUTO DIFF WBC: CPT | Performed by: STUDENT IN AN ORGANIZED HEALTH CARE EDUCATION/TRAINING PROGRAM

## 2024-12-12 PROCEDURE — 80053 COMPREHEN METABOLIC PANEL: CPT | Performed by: STUDENT IN AN ORGANIZED HEALTH CARE EDUCATION/TRAINING PROGRAM

## 2024-12-12 PROCEDURE — 1101F PT FALLS ASSESS-DOCD LE1/YR: CPT | Mod: CPTII,,, | Performed by: NURSE PRACTITIONER

## 2024-12-12 PROCEDURE — 3078F DIAST BP <80 MM HG: CPT | Mod: CPTII,,, | Performed by: NURSE PRACTITIONER

## 2024-12-12 PROCEDURE — 83735 ASSAY OF MAGNESIUM: CPT | Performed by: STUDENT IN AN ORGANIZED HEALTH CARE EDUCATION/TRAINING PROGRAM

## 2024-12-12 PROCEDURE — 25000003 PHARM REV CODE 250: Performed by: STUDENT IN AN ORGANIZED HEALTH CARE EDUCATION/TRAINING PROGRAM

## 2024-12-12 PROCEDURE — 3288F FALL RISK ASSESSMENT DOCD: CPT | Mod: CPTII,,, | Performed by: NURSE PRACTITIONER

## 2024-12-12 PROCEDURE — 85610 PROTHROMBIN TIME: CPT | Performed by: STUDENT IN AN ORGANIZED HEALTH CARE EDUCATION/TRAINING PROGRAM

## 2024-12-12 PROCEDURE — 1160F RVW MEDS BY RX/DR IN RCRD: CPT | Mod: CPTII,,, | Performed by: NURSE PRACTITIONER

## 2024-12-12 PROCEDURE — 93005 ELECTROCARDIOGRAM TRACING: CPT

## 2024-12-12 PROCEDURE — 81001 URINALYSIS AUTO W/SCOPE: CPT | Performed by: STUDENT IN AN ORGANIZED HEALTH CARE EDUCATION/TRAINING PROGRAM

## 2024-12-12 PROCEDURE — 3074F SYST BP LT 130 MM HG: CPT | Mod: CPTII,,, | Performed by: NURSE PRACTITIONER

## 2024-12-12 PROCEDURE — 86850 RBC ANTIBODY SCREEN: CPT | Performed by: STUDENT IN AN ORGANIZED HEALTH CARE EDUCATION/TRAINING PROGRAM

## 2024-12-12 PROCEDURE — 1159F MED LIST DOCD IN RCRD: CPT | Mod: CPTII,,, | Performed by: NURSE PRACTITIONER

## 2024-12-12 PROCEDURE — 86900 BLOOD TYPING SEROLOGIC ABO: CPT | Performed by: STUDENT IN AN ORGANIZED HEALTH CARE EDUCATION/TRAINING PROGRAM

## 2024-12-12 PROCEDURE — 87086 URINE CULTURE/COLONY COUNT: CPT | Performed by: STUDENT IN AN ORGANIZED HEALTH CARE EDUCATION/TRAINING PROGRAM

## 2024-12-12 PROCEDURE — 36415 COLL VENOUS BLD VENIPUNCTURE: CPT | Performed by: STUDENT IN AN ORGANIZED HEALTH CARE EDUCATION/TRAINING PROGRAM

## 2024-12-12 PROCEDURE — 99285 EMERGENCY DEPT VISIT HI MDM: CPT | Mod: 25,27

## 2024-12-12 PROCEDURE — 99215 OFFICE O/P EST HI 40 MIN: CPT | Mod: PBBFAC | Performed by: NURSE PRACTITIONER

## 2024-12-12 PROCEDURE — 99214 OFFICE O/P EST MOD 30 MIN: CPT | Mod: S$PBB,,, | Performed by: NURSE PRACTITIONER

## 2024-12-12 RX ADMIN — SODIUM CHLORIDE 500 ML: 9 INJECTION, SOLUTION INTRAVENOUS at 04:12

## 2024-12-12 NOTE — ED PROVIDER NOTES
Encounter Date: 12/12/2024       History     Chief Complaint   Patient presents with    Hypotension     Sent from cardiolody to ER due to  hypotension, pt bp currently 106/66 in triage. Per daughter, patient has been dizzy with walking and had bloody stools x 3- days. Patient on blood thinners due to A fib and stents placed April 2024. Hx of anemia. Pt is AAO x 4 in triage.      Patient presents to the emergency department due to lightheadedness and low blood pressure.  She has been having bloody stools for about 3-4 months now due to bleeding internal hemorrhoids.  She is currently on Eliquis and Plavix due to having coronary stents.  Blood pressures have been  low for the last 3 days and she has been getting orthostatic lightheadedness    The history is provided by the patient and a relative.     Review of patient's allergies indicates:   Allergen Reactions    Ferric derisomaltose Shortness Of Breath     Past Medical History:   Diagnosis Date    CHF (congestive heart failure) See records    Clotting disorder     Hearing loss 2 years ago    After Covid it got worse    Hyperlipidemia     Hypertension     Obesity N/a     Past Surgical History:   Procedure Laterality Date    APPENDECTOMY      CORONARY ANGIOPLASTY  2024    HIP REPLACEMENT ARTHROPLASTY      HIP SURGERY  7 years ago    JOINT REPLACEMENT  2016    tolsillectomy       Family History   Problem Relation Name Age of Onset    Stroke Mother Ash Hernandez     Hypertension Mother Ash Hernandez     Kidney failure Father       Social History     Tobacco Use    Smoking status: Former     Current packs/day: 0.00     Average packs/day: 0.5 packs/day for 42.0 years (21.0 ttl pk-yrs)     Types: Cigarettes     Start date: 1/1/1968     Quit date: 1/1/2010     Years since quitting: 15.0    Smokeless tobacco: Never    Tobacco comments:     I stop smoking more than 10 years ago   Substance Use Topics    Alcohol use: Not Currently    Drug use: Never     Review of Systems    Constitutional:  Negative for chills and fever.   HENT:  Negative for congestion and sore throat.    Respiratory:  Negative for cough and shortness of breath.    Cardiovascular:  Negative for chest pain and palpitations.   Gastrointestinal:  Positive for blood in stool. Negative for abdominal pain, diarrhea, nausea and vomiting.   Genitourinary:  Negative for dysuria and hematuria.   Musculoskeletal:  Negative for arthralgias and myalgias.   Neurological:  Positive for dizziness and light-headedness. Negative for weakness.       Physical Exam     Initial Vitals [12/12/24 1623]   BP Pulse Resp Temp SpO2   106/66 60 16 97.8 °F (36.6 °C) 97 %      MAP       --         Physical Exam    Nursing note and vitals reviewed.  Constitutional: She appears well-developed and well-nourished.   HENT:   Head: Normocephalic and atraumatic.   Eyes: EOM are normal. Pupils are equal, round, and reactive to light.   Neck: Neck supple.   Normal range of motion.  Cardiovascular:  Normal rate, regular rhythm and normal heart sounds.           Pulmonary/Chest: Breath sounds normal. No respiratory distress.   Abdominal: Abdomen is soft. She exhibits no distension. There is no abdominal tenderness. There is no rebound.   Musculoskeletal:         General: No edema. Normal range of motion.      Cervical back: Normal range of motion and neck supple.     Neurological: She is alert and oriented to person, place, and time.   Skin: Skin is warm and dry.         ED Course   Procedures  Labs Reviewed   CULTURE, URINE - Abnormal       Result Value    Urine Culture >/= 100,000 colonies/ml Escherichia coli (*)    COMPREHENSIVE METABOLIC PANEL - Abnormal    Sodium 140      Potassium 3.8      Chloride 107      CO2 24      Glucose 114      Blood Urea Nitrogen 16.6      Creatinine 0.97      Calcium 10.7 (*)     Protein Total 7.4      Albumin 4.2      Globulin 3.2      Albumin/Globulin Ratio 1.3      Bilirubin Total 0.7      ALP 77      ALT 16      AST 41  (*)     eGFR 58      Anion Gap 9.0      BUN/Creatinine Ratio 17     URINALYSIS, REFLEX TO URINE CULTURE - Abnormal    Color, UA Yellow      Appearance, UA Clear      Specific Gravity, UA 1.016      pH, UA 5.5      Protein, UA Negative      Glucose, UA 4+ (*)     Ketones, UA Negative      Blood, UA Negative      Bilirubin, UA Negative      Urobilinogen, UA Normal      Nitrites, UA 2+ (*)     Leukocyte Esterase, UA 75 (*)     RBC, UA 0-5      WBC, UA 11-20 (*)     WBC Clumps, UA Occasional (*)     Bacteria, UA Moderate (*)     Squamous Epithelial Cells, UA Few (*)     Mucous, UA Trace (*)     Hyaline Casts, UA 0-2 (*)    PROTIME-INR - Abnormal    PT 15.5 (*)     INR 1.2     MAGNESIUM - Normal    Magnesium Level 2.50     TROPONIN I - Normal    Troponin-I 0.017     LACTIC ACID, PLASMA - Normal    Lactic Acid Level 1.2     CBC W/ AUTO DIFFERENTIAL    Narrative:     The following orders were created for panel order CBC auto differential.  Procedure                               Abnormality         Status                     ---------                               -----------         ------                     CBC with Differential[[id number]]                           Final result                 Please view results for these tests on the individual orders.   CBC WITH DIFFERENTIAL    WBC 6.47      RBC 5.14      Hgb 14.4      Hct 43.5      MCV 84.6      MCH 28.0      MCHC 33.1      RDW 15.9      Platelet 176      MPV 9.3      Neut % 57.6      Lymph % 29.7      Mono % 9.3      Eos % 2.3      Basophil % 0.8      Lymph # 1.92      Neut # 3.73      Mono # 0.60      Eos # 0.15      Baso # 0.05      IG# 0.02      IG% 0.3      NRBC% 0.0     TYPE & SCREEN    Group & Rh A POS      Indirect Huseyin GEL NEG      Specimen Outdate 12/15/2024 23:59       EKG Readings: (Independently Interpreted)   Initial Reading: No STEMI. Rhythm: Atrial Fibrillation. Heart Rate: 63. Ectopy: No Ectopy. Conduction: Normal. Axis: Normal.     ECG Results               EKG 12-lead (Final result)        Collection Time Result Time QRS Duration OHS QTC Calculation    12/12/24 16:42:25 12/12/24 18:23:53 84 425                     Final result by Interface, Lab In [facility] (12/12/24 18:23:59)                   Narrative:    Test Reason : I95.9,    Vent. Rate :  63 BPM     Atrial Rate :  62 BPM     P-R Int :    ms          QRS Dur :  84 ms      QT Int : 416 ms       P-R-T Axes :     38  36 degrees    QTcB Int : 425 ms    Atrial fibrillation  Abnormal ECG  When compared with ECG of 17-Nov-2023 08:27,  No significant change was found  Confirmed by Torey Shah ([id number]) on 12/12/2024 6:23:52 PM    Referred By:            Confirmed By: Torey Shah                                     EKG 12-lead (Final result)  Result time 12/18/24 13:31:01      Final result by Unknown User (12/18/24 13:31:01)                                      Imaging Results              X-Ray Chest AP Portable (Final result)  Result time 12/12/24 17:06:12      Final result by Jaylan Devi MD (12/12/24 17:06:12)                   Impression:      No acute intracranial findings identified.      Electronically signed by: Jaylan Devi  Date:    12/12/2024  Time:    17:06               Narrative:    EXAMINATION:  XR CHEST AP PORTABLE    CLINICAL HISTORY:  Hypotension;    TECHNIQUE:  One view    COMPARISON:  August 10, 2024.    FINDINGS:  Cardiopericardial silhouette is within normal limits.  No acute dense focal or segmental consolidation, congestive process, pleural effusions or pneumothorax.                                       Medications   sodium chloride 0.9% bolus 500 mL 500 mL (0 mLs Intravenous Stopped 12/12/24 8946)     Medical Decision Making  Hypotension resolved after IV fluids.  EKGs without concerning changes.  Lab work including CBC, CMP reassuring.  Patient feels goo, at her per baseline, no blood on rectal exam, appropriate for discharge and follow up closely with the primary care  physician.    Amount and/or Complexity of Data Reviewed  Labs: ordered. Decision-making details documented in ED Course.  Radiology: ordered. Decision-making details documented in ED Course.  ECG/medicine tests: ordered and independent interpretation performed. Decision-making details documented in ED Course.                                      Clinical Impression:  Final diagnoses:  [I95.9] Hypotension (Primary)          ED Disposition Condition    Discharge Stable          ED Prescriptions    None       Follow-up Information       Follow up With Specialties Details Why Contact Info    [facility] Emergency Dept Emergency Medicine Go to  If symptoms worsen [address] [facility] [id number] [phone]    Carey Mohamud MD Family Medicine Call  As needed [address] [facility] [address]  [phone]               Aguila Toussaint MD  01/02/25 [id number]

## 2024-12-12 NOTE — PATIENT INSTRUCTIONS
Discontinue spironolactone due to symptomatic hypotension  Nurse visit in 2-3 weeks for BP/HR check   48 Hour Holter Monitor   Follow up in Cardiology Clinic in 2 months or sooner if needed   Follow up with PCP as directed

## 2024-12-12 NOTE — PROGRESS NOTES
CHIEF COMPLAINT:   Chief Complaint   Patient presents with    Follow-up     Patient c/o having dizziness X 2-3 days with ringing in ears.                   Review of patient's allergies indicates:   Allergen Reactions    Ferric derisomaltose Shortness Of Breath                                          HPI:  Gilmar Vera 83 y.o. female with a past medical history of CAD s/p PCI RCA (April 2024), NICM (recovered) (EF prev. 40% - now 60% per ECHO 11.12.24), Chronic Atrial Fibrillation (on Eliquis) and VHD who presents to cardiology clinic for routine follow up and results of testing.  Echocardiogram obtained on 11.12.24 continued monitoring of VHD revealed a preserved EF of 60%, mild-to-moderate MR and mild-to-moderate TR which is stable from previous Echo completed in September 2022.  Most recent ischemic evaluation includes a Left Heart Catheterization in April 2024 (done in the patient's home country of [facility]) in the setting of unstable anginal with PCI of RCA for a reported 99% stenosis.    Of note, the patient completed a previous Lexiscan Stress Test in June 2020 that was abnormal, demonstrating lateral-apical ischemia with no scar and no wall motion abnormality that was previously medically managed.  Notably, patient underwent a prior Left Heart Catheterization in 2017 that revealed nonobstructive CAD (55% eccentric stenosis proximal mid RCA and 30% distal stenosis to mLAD).      The patient's daughter reports that the patient also completed and Echocardiogram and Carotid Ultrasound prior to the [place] returning to the [place] with stable results.    Patient presents to clinic today reporting an overall feeling of malaise, weakness and dizziness over the past 3-4 days.  Patient's daughter states the the patient was feeling well before the aforementioned timeframe, walking approximately 5000 steps a day without any issues.  She also reports symptomatic hypotension at home with systolic blood pressure  reportedly in the 90s. Patient denies any chest pain, shortness of breath, palpitations or lower extremity edema.  However, she is experiencing significant dizziness, with a sensation of feeling near syncopal without any actual syncopal episodes.  To note, the patient reports consistent bleeding hemorrhoids over the past several months and expresses concern for possible acute symptomatic anemia.       Patient's daughter is translating during clinic visit.       CARDIAC TESTING:  Echocardiogram 9.30.22:  The left ventricle is normal in size with eccentric hypertrophy and low normal systolic function.  The estimated ejection fraction is 50%.  Indeterminate left ventricular diastolic function.  Mild right ventricular enlargement with low normal right ventricular systolic function.  Moderate right atrial enlargement.  Mild to moderate pulmonic regurgitation.  Mild-to-moderate mitral regurgitation.  Mild to moderate tricuspid regurgitation.  Mild left atrial enlargement.  There is mild pulmonary hypertension.    Lexiscan Stress Test 6/2020:   Lexiscan stress test June 2020:  Scan is consistent with: Lateral-apical ischemia  No scar  Ejection fraction: 90%  No wall motion abnormality    Echocardiogram (12/2019): EF of 50 to 55% with mild MR, mild TR and RVSP of 26 mmHg    [facility] (2017): Dominant RCA with moderate atherosclerotic plaque and 55% eccentric stenosis proximal mid vessel, LAD with mild atherosclerotic plaque and 30% distal stenosis, 3-4+ MR, Normal LVEF 60%                                                                                                                                                                                                                                               Patient Active Problem List   Diagnosis    Heart failure with reduced ejection fraction    Chronic atrial fibrillation    Coronary artery disease involving native coronary artery of native heart without angina pectoris    RHOADES  (dyspnea on exertion)    Peripheral edema    Cardiac conduction disorder    Hyperlipidemia     Past Surgical History:   Procedure Laterality Date    APPENDECTOMY      CORONARY ANGIOPLASTY      HIP REPLACEMENT ARTHROPLASTY      HIP SURGERY  7 years ago    JOINT REPLACEMENT  2016    tolsillectomy       Social History     Socioeconomic History    Marital status:    Tobacco Use    Smoking status: Former     Current packs/day: 0.00     Average packs/day: 0.5 packs/day for 42.0 years (21.0 ttl pk-yrs)     Types: Cigarettes     Start date: 1968     Quit date: 2010     Years since quittin.9    Smokeless tobacco: Never    Tobacco comments:     I stop smoking more than 10 years ago   Substance and Sexual Activity    Alcohol use: Not Currently    Drug use: Never    Sexual activity: Not Currently        Family History   Problem Relation Name Age of Onset    Stroke Mother Ash Hernandez     Hypertension Mother Ash Hernandez     Kidney failure Father           Current Outpatient Medications:     apixaban (ELIQUIS) 5 mg Tab, Take 5 mg by mouth 2 (two) times daily., Disp: , Rfl:     atorvastatin (LIPITOR) 40 MG tablet, Take 1 tablet (40 mg total) by mouth every evening., Disp: 90 tablet, Rfl: 3    bisoprolol (ZEBETA) 5 MG tablet, Take 1 tablet (5 mg total) by mouth once daily., Disp: 30 tablet, Rfl: 11    clopidogreL (PLAVIX) 75 mg tablet, Take 1 tablet (75 mg total) by mouth once daily., Disp: 30 tablet, Rfl: 11    dapagliflozin propanediol (FARXIGA) 10 mg tablet, Take 1 tablet (10 mg total) by mouth once daily., Disp: 30 tablet, Rfl: 11    furosemide (LASIX) 20 MG tablet, Take 1 tablet (20 mg total) by mouth daily as needed., Disp: 90 tablet, Rfl: 3    nitroGLYCERIN (NITROSTAT) 0.4 MG SL tablet, DISSOLVE 1 TABLET UNDER THE TONGUE EVERY 5 MINUTES AS NEEDED FOR CHEST PAIN UP TO 3 DOSES IN 15 MINUTES. IF NO RELIEF, GO TO ER, Disp: 25 tablet, Rfl: 1    spironolactone (ALDACTONE) 25 MG tablet, Take 0.5 tablets  "(12.5 mg total) by mouth once daily., Disp: 15 tablet, Rfl: 11    allopurinoL (ZYLOPRIM) 100 MG tablet, Take 100 mg by mouth once daily. (Patient not taking: Reported on 12/12/2024), Disp: , Rfl:     aspirin (ECOTRIN) 81 MG EC tablet, Take 1 tablet by mouth every morning. (Patient not taking: Reported on 12/12/2024), Disp: , Rfl:     diclofenac sodium (VOLTAREN) 1 % Gel, Apply 2 g topically once daily. (Patient not taking: Reported on 2/7/2024), Disp: 400 g, Rfl: 1    diclofenac sodium (VOLTAREN) 1 % Gel, Apply 2 g topically. (Patient not taking: Reported on 12/12/2024), Disp: , Rfl:     pantoprazole (PROTONIX) 40 MG tablet, Take 1 tablet (40 mg total) by mouth once daily. (Patient not taking: Reported on 12/12/2024), Disp: 90 tablet, Rfl: 1    prucalopride (MOTEGRITY) 1 mg tablet, Take 1 tablet (1 mg total) by mouth once daily. (Patient not taking: Reported on 12/12/2024), Disp: 90 tablet, Rfl: 0     ROS:                                                                                                                                                                             Review of Systems   Constitutional:  Positive for malaise/fatigue.   Respiratory:  Negative for shortness of breath.    Cardiovascular:  Negative for chest pain and leg swelling.   Gastrointestinal: Negative.    Skin: Negative.    Neurological:  Positive for dizziness and weakness.   Psychiatric/Behavioral: Negative.          Blood pressure 100/71, pulse 80, temperature 97.3 °F (36.3 °C), temperature source Oral, resp. rate 18, height 5' 3" (1.6 m), weight 83.2 kg (183 lb 6.4 oz), SpO2 96%.   PE:  Physical Exam  Constitutional:       Appearance: Normal appearance.   HENT:      Head: Normocephalic and atraumatic.   Eyes:      Extraocular Movements: Extraocular movements intact.      Pupils: Pupils are equal, round, and reactive to light.   Cardiovascular:      Rate and Rhythm: Normal rate. Rhythm irregular.   Pulmonary:      Effort: Pulmonary " effort is normal.      Breath sounds: Normal breath sounds.   Abdominal:      Palpations: Abdomen is soft.   Musculoskeletal:         General: Normal range of motion.      Cervical back: Normal range of motion.   Skin:     General: Skin is warm and dry.   Neurological:      General: No focal deficit present.      Mental Status: She is alert and oriented to person, place, and time.   Psychiatric:         Mood and Affect: Mood normal.         Behavior: Behavior normal.       ASSESSMENT/PLAN:  Hypotension  Weakness/Malaise  Dizziness/Near Syncope   - Patient was symptomatic hypotension.  Endorses weakness/dizziness with near-syncope episodes that improves whenever she lies down.  The patient is normally active and could ambulate approximately 5000 steps a day without any issues.   - Symptoms could be related to anemia, dehydration etc. Recommend the patient report to the emergency room for acute evaluation of ongoing symptoms.  - Continue Bisoprolol 5 mg and Lasix 40 mg. Discontinue spironolactone due to symptomatic hypotension  - NV in 2-3 weeks for BP check  - Advised on low sodium diet and exercise as tolerated.   - Hypotension precautions provided     CAD s/p PCI RCA per [facility]  April 2024  - [facility] - PCI RCA per [facility] April 2024 [place]   - Abnormal Nuclear Stress Test - lateral-apical ischemia with no scar,EF of 90% with no wall motion abnormality (6.5.20).  Previously medically managed  - [facility] - Dominant RCA with moderate atherosclerotic plaque and 55% stenosis in proximal and mid vessel, LAD with mid atherosclerotic plaque and 30% distal stenosis (2017)  - Lexiscan - positive for lateral-apical ischemia with no scar, EF of 90% with no wall motion abnormality (6.25.20)  - Denies any CP or SOB  - Continue Plavix 75 mg, Atorvastatin 40 mg, bisoprolol 5 mg, and SL Nitro prn CP  - Counseled on heart healthy diet and increased activity as tolerated    NICM (Recovered EF) - [facility] Class II  - EF  -60%, mild to mod MR, and mild to mod TR per ECHO 11.12.24  - Recovered EF 50% Sept. 2022-->EF-50-55% per ECHO Dec. 2019-->EF-40%  - ECHO - EF of 50 to 55% with mild MR, mild TR and RVSP of 26 mmHg (improved from EF 40%) (12/2019)  - Denies SOB or RHOADES.  - Euvolemic upon exam  - Continue GDMT: Bisoprolol 5 mg and Farxiga 10 mg.  Unfortunately will discontinue spironolactone due to symptomatic hypotension  - Losartan previously discontinued due to hypotension  - Continue Lasix 40 mg daily as directed for now.  Sending the patient down to emergency room for acute evaluation symptomatic hypotension.  May need to adjust diuresis pending findings.  - Counseled on low salt diet and s/s of volume overload     Atrial Fibrillation, chronic   - Reports fluctuating heart rates at home.  Obtain a 48 hour Holter monitor for further evaluation.  - Rate Control: Continue Bisoprolol 5 mg. Metoprolol tartrate previously discontinued  - Anticoagulation: Continue Eliquis 5 mg twice daily for now.  Pending acute emergency room evaluation.    HLD  - LDL-50 at goal   - Continue Atorvastatin 40 mg daily. Recently increased during  hospitalization   - Advised on heart healthy, low-cholesterol diet and exercise as tolerated    VHD  - EF- 60%, mild-to-moderate MR and mild-to-moderate TR per ECHO 11.12.24  - EF- 50%, mild to Moderate [place], mild to moderate MR and mild to moderate TR per ECHO 9.30.22  - Will continue to monitor with serial Echocardiograms    Carotid Artery Stenosis  - Patient reportedly completed an Carotid US and was found to minimal stenosis  - Will plan to obtain Carotid ultrasound in 2025 for continued monitoring    Discontinue spironolactone due to symptomatic hypotension  Nurse visit in 2-3 weeks for BP/HR check   48 Hour Holter Monitor   Follow up in Cardiology Clinic in 2 months or sooner if needed   Follow up with PCP as directed

## 2024-12-15 ENCOUNTER — TELEPHONE (OUTPATIENT)
Dept: EMERGENCY MEDICINE | Facility: HOSPITAL | Age: 84
End: 2024-12-15
Payer: MEDICAID

## 2024-12-15 LAB — BACTERIA UR CULT: ABNORMAL

## 2024-12-15 NOTE — TELEPHONE ENCOUNTER
Prescription called in to [facility] on [address] for Macrobid per patient request.

## 2024-12-17 ENCOUNTER — OFFICE VISIT (OUTPATIENT)
Dept: FAMILY MEDICINE | Facility: CLINIC | Age: 84
End: 2024-12-17
Payer: MEDICAID

## 2024-12-17 VITALS
TEMPERATURE: 98 F | OXYGEN SATURATION: 97 % | BODY MASS INDEX: 33.42 KG/M2 | HEART RATE: 61 BPM | HEIGHT: 63 IN | RESPIRATION RATE: 18 BRPM | WEIGHT: 188.63 LBS | DIASTOLIC BLOOD PRESSURE: 71 MMHG | SYSTOLIC BLOOD PRESSURE: 103 MMHG

## 2024-12-17 DIAGNOSIS — K21.9 GASTROESOPHAGEAL REFLUX DISEASE, UNSPECIFIED WHETHER ESOPHAGITIS PRESENT: ICD-10-CM

## 2024-12-17 DIAGNOSIS — K62.5 RECTAL BLEEDING: ICD-10-CM

## 2024-12-17 DIAGNOSIS — I95.9 HYPOTENSION, UNSPECIFIED HYPOTENSION TYPE: ICD-10-CM

## 2024-12-17 DIAGNOSIS — R60.0 PERIPHERAL EDEMA: Primary | ICD-10-CM

## 2024-12-17 DIAGNOSIS — K59.09 CHRONIC CONSTIPATION: ICD-10-CM

## 2024-12-17 DIAGNOSIS — N39.0 RECURRENT UTI: Primary | ICD-10-CM

## 2024-12-17 DIAGNOSIS — I50.20 HEART FAILURE WITH REDUCED EJECTION FRACTION: ICD-10-CM

## 2024-12-17 PROCEDURE — 1159F MED LIST DOCD IN RCRD: CPT | Mod: CPTII,,, | Performed by: FAMILY MEDICINE

## 2024-12-17 PROCEDURE — 1160F RVW MEDS BY RX/DR IN RCRD: CPT | Mod: CPTII,,, | Performed by: FAMILY MEDICINE

## 2024-12-17 PROCEDURE — 99215 OFFICE O/P EST HI 40 MIN: CPT | Mod: PBBFAC | Performed by: FAMILY MEDICINE

## 2024-12-17 PROCEDURE — 99215 OFFICE O/P EST HI 40 MIN: CPT | Mod: S$PBB,,, | Performed by: FAMILY MEDICINE

## 2024-12-17 PROCEDURE — 1101F PT FALLS ASSESS-DOCD LE1/YR: CPT | Mod: CPTII,,, | Performed by: FAMILY MEDICINE

## 2024-12-17 PROCEDURE — 3288F FALL RISK ASSESSMENT DOCD: CPT | Mod: CPTII,,, | Performed by: FAMILY MEDICINE

## 2024-12-17 PROCEDURE — 3078F DIAST BP <80 MM HG: CPT | Mod: CPTII,,, | Performed by: FAMILY MEDICINE

## 2024-12-17 PROCEDURE — 3074F SYST BP LT 130 MM HG: CPT | Mod: CPTII,,, | Performed by: FAMILY MEDICINE

## 2024-12-17 RX ORDER — FUROSEMIDE 20 MG/1
20 TABLET ORAL DAILY PRN
Qty: 90 TABLET | Refills: 3 | Status: SHIPPED | OUTPATIENT
Start: 2024-12-17 | End: 2026-12-07

## 2024-12-17 RX ORDER — PANTOPRAZOLE SODIUM 40 MG/1
40 TABLET, DELAYED RELEASE ORAL DAILY
Qty: 90 TABLET | Refills: 1 | Status: SHIPPED | OUTPATIENT
Start: 2024-12-17

## 2024-12-17 NOTE — PROGRESS NOTES
Patient Name: Gilmar Vera   : 1940  MRN: [id number]     SUBJECTIVE:  Gilmar Vera is a 83 y.o. female here for Follow-up (The patient states that constipation is getting better. Daughter wants to discuss the reoccurrence of bladder infections. No flu vaccine per her daughter until her mother feels better.)  .    HPI  Here for routine follow up.  Accompanied by her daughter who translates most of the time.    Patient was recently in ER about 4 days ago for symptomatic hypotension, sent from Cardiology office.  She was advised at that visit with Cardiology to discontinue spironolactone and they are going to recheck 48 hours Holter monitor  At the Emergency room patient was lightheaded, hypotensive, also reported having bloody stools for 3-4 months because of hemorrhoids.  Patient on Eliquis for AFib and Plavix for CAD status post PCI.  EKG showed AFib.  CMP just slightly elevated calcium and AST.  Chest x-ray normal.  CBC normal, no anemia.  She was given IV fluids and felt better with improved blood pressure.  Discharged home.  Later she was called for positive UTI/urine culture and was prescribed Macrobid.  Patient states she feels much better.  Not that weak anymore.  Good appetite.  Not dizzy either.  Patient reportedly did have a GI evaluation while in [facility] and was found to have hemorrhoids. Does have rectal bleeding with wiping from hemorrhoids. Saw GI there nand declines to see them again. And is better with improvement of the constipation.  Constipation has greatly improved. Drinking warm water in the morning and has regular bm since then. Has not even been using the prucalopride. Sometimes epigastric pain, was found to have gastritis. Protonix helps.     Requesting renewal of handicap placard.  Can not walk too long without needing to stop to rest because sometimes feeling short of breath due to underlying CAD and also using a cane.    Flu shot: will have it at the pharmacy        ALLERGIES:  "  Review of patient's allergies indicates:   Allergen Reactions    Ferric derisomaltose Shortness Of Breath         ROS:  Review of Systems   Constitutional:  Negative for chills, fever and weight loss.   HENT:  Negative for congestion.    Respiratory:  Negative for cough and shortness of breath.    Cardiovascular:  Negative for chest pain, palpitations and leg swelling.   Gastrointestinal:  Positive for blood in stool, constipation and heartburn. Negative for diarrhea, nausea and vomiting.   Genitourinary:  Negative for dysuria and hematuria.   Neurological:  Negative for dizziness and headaches.   Psychiatric/Behavioral:  Negative for depression. The patient is not nervous/anxious.          OBJECTIVE:  Vital signs  Vitals:    12/17/24 0813   BP: 103/71   Pulse: 61   Resp: 18   Temp: 97.8 °F (36.6 °C)   TempSrc: Oral   SpO2: 97%   Weight: 85.5 kg (188 lb 9.6 oz)   Height: 5' 3" (1.6 m)      Body mass index is 33.41 kg/m².    PHYSICAL EXAM:   Physical Exam  Vitals reviewed.   Constitutional:       General: She is not in acute distress.     Appearance: Normal appearance. She is not ill-appearing.   HENT:      Head: Normocephalic and atraumatic.      Nose: Nose normal. No rhinorrhea.      Mouth/Throat:      Mouth: Mucous membranes are moist.   Eyes:      General: No scleral icterus.        Right eye: No discharge.         Left eye: No discharge.      Conjunctiva/sclera: Conjunctivae normal.      Pupils: Pupils are equal, round, and reactive to light.   Cardiovascular:      Rate and Rhythm: Normal rate. Rhythm irregular.   Pulmonary:      Effort: Pulmonary effort is normal. No respiratory distress.      Breath sounds: No wheezing, rhonchi or rales.   Abdominal:      General: There is no distension.      Palpations: Abdomen is soft.      Tenderness: There is no abdominal tenderness.   Musculoskeletal:      Cervical back: Normal range of motion and neck supple. No rigidity or tenderness.      Right lower leg: No edema. "      Left lower leg: No edema.      Comments: Uses cane   Skin:     General: Skin is warm.      Findings: No rash.   Neurological:      Mental Status: She is alert and oriented to person, place, and time.   Psychiatric:         Mood and Affect: Mood normal.         Behavior: Behavior normal.          ASSESSMENT/PLAN:  1. Recurrent UTI  Two positive urine cultures in the past few months.  Refer to Urology.  Per daughter, she has been diagnosed with uterine prolapse in the past.  Just started the Macrobid, encouraged to continue and finish the regimen.  Urine culture was positive for E coli and sensitive to Macrobid.  -     Ambulatory referral/consult to Urology; Future; Expected date: 12/24/2024    2. Gastroesophageal reflux disease, unspecified whether esophagitis present  Stable on Protonix.  Continue with it.  -     pantoprazole (PROTONIX) 40 MG tablet; Take 1 tablet (40 mg total) by mouth once daily.  Dispense: 90 tablet; Refill: 1    3. Hypotension, unspecified hypotension type  Stable blood pressure and not that symptomatic the past few days since started Macrobid for UTI and since being off of spironolactone.  Continue off of spironolactone and continue to follow up with Cardiology.  AFib also with well-controlled heart rate.  Discussed with patient that if she develops any increased pulse with hypotension, having underlying AFib, she should go to the emergency room immediately for concern for unstable AFib with RVR.  Patient and her daughter voiced understanding.  They state though that her heart rate is always stable into 60s.  No palpitations either.  Also discussed amount of rectal bleeding if it increases in volume/frequency, to let us know or go to the emergency room.  CBC with no anemia though.  Declines referral to GI.      4. Chronic constipation  Improving with lifestyle changes, well hydration.  Continue with it.  Has not been using the prucalopride at all.  Discussed with patient that if she is  still has hard stools, strain a lot to have a bowel movement especially having underlying hemorrhoids, she should consider resuming the medication.      5. Rectal bleeding  Due to hemorrhoids.  Just had colonoscopy done.  Also on Eliquis and Plavix, but benefits outweighs risks.  Not on baby aspirin since she was placed on Plavix.  She needs to continue with the blood thinners also as directed from Cardiology.  Also discussed amount of rectal bleeding if it increases in volume/frequency, to let us know or go to the emergency room.  CBC with no anemia though.  Declines referral to GI.         I spent a total of 50 minutes on the day of the visit.This includes face to face time and non-face to face time preparing to see the patient (eg, review of tests), obtaining and/or reviewing separately obtained history, documenting clinical information in the electronic or other health record, independently interpreting results and communicating results to the patient/family/caregiver, or care coordinator.        Previous medical history/lab work/radiology reviewed and considered during medical management decisions.   Medication list reviewed and medication reconciliation performed.  Patient was provided  and care about his/her current diagnosis (es) and medications including risk/benefit and side effects/adverse events, over the counter medication uses/doses, home self-care and contact precautions,  and red flags and indications for when to seek immediate medical attention.   Patient was advised to continue compliance with current medication list and medical recommendations.  Recommended/ Advised continued compliance with recommended eating habits/ diets for medical conditions and exercise 150 minutes/ week (if possible) for medical condition (s).        RESULTS:  Recent Results (from the past 6 weeks)   Echo    Collection Time: 11/12/24 11:57 AM   Result Value Ref Range    BSA 1.94 m2    Green's Biplane MOD Ejection  Fraction 60 %    A2C EF 56 %    A4C EF 64 %    LVOT stroke volume 53.4 cm3    LVIDd 4.6 3.5 - 6.0 cm    LV Systolic Volume 33.01 mL    LV Systolic Volume Index 17.6 mL/m2    LVIDs 2.9 2.1 - 4.0 cm    LV ESV A2C 41.94 mL    LV Diastolic Volume 99.07 mL    LV ESV A4C 44.17 mL    LV Diastolic Volume Index 52.70 mL/m2    LV EDV A2C [id number] mL    LV EDV A4C 27.37 mL    Left Ventricular End Systolic Volume by Teichholz Method 33.01 mL    Left Ventricular End Diastolic Volume by Teichholz Method 99.07 mL    IVS 0.9 0.6 - 1.1 cm    LVOT diameter 2.0 cm    LVOT area 3.1 cm2    FS 37.0 28 - 44 %    Left Ventricle Relative Wall Thickness 0.43 cm    PW 1.0 0.6 - 1.1 cm    LV mass 148.1 g    LV Mass Index 78.8 g/m2    MV Peak E [place] 1.16 m/s    MV Peak A [place] 0.45 m/s    TR Max [place] 3.28 m/s    E/A ratio 2.58     E wave deceleration time 138.40 msec    LVOT peak reno 0.7 m/s    Left Ventricular Outflow Tract Mean Velocity 0.54 cm/s    Left Ventricular Outflow Tract Mean Gradient 1.26 mmHg    TAPSE 1.75 cm    LA size 4.37 cm    LA Vol (MOD) 44.02 mL    CHARIS (MOD) 23.4 mL/m2    RA Major Axis 5.06 cm    AV mean gradient 2.8 mmHg    AV peak gradient 4.0 mmHg    Ao peak reno 1.0 m/s    Ao VTI 21.1 cm    LVOT peak VTI 17.0 cm    AV valve area 2.5 cm²    AV Velocity Ratio 0.70     AV index (prosthetic) 0.81     GIAN by Velocity Ratio 2.2 cm²    Mr max reno 4.96 m/s    MV mean gradient 2 mmHg    MV peak gradient 8 mmHg    MV valve area by continuity eq 2.43 cm2    MV VTI 22.0 cm    Triscuspid Valve Regurgitation Peak Gradient 43 mmHg    ZLVIDS -0.87     ZLVIDD -1.32     LA area A4C 17.42 cm2    LA area A2C 16.36 cm2    Mitral Valve Heart Rate 85 bpm    TV resting pulmonary artery pressure 46 mmHg    RV TB RVSP 6 mmHg    Est. RA pres 3 mmHg    IVC diameter 2.0 cm   EKG 12-lead    Collection Time: 12/12/24  4:42 PM   Result Value Ref Range    QRS Duration 84 ms    [facility] QTC Calculation 425 ms   Comprehensive metabolic panel     Collection Time: 12/12/24  4:50 PM   Result Value Ref Range    Sodium 140 136 - 145 mmol/L    Potassium 3.8 3.5 - 5.1 mmol/L    Chloride 107 98 - 107 mmol/L    CO2 24 23 - 31 mmol/L    Glucose 114 82 - 115 mg/dL    Blood Urea Nitrogen 16.6 9.8 - 20.1 mg/dL    Creatinine 0.97 0.55 - 1.02 mg/dL    Calcium 10.7 (H) 8.4 - 10.2 mg/dL    Protein Total 7.4 5.8 - 7.6 gm/dL    Albumin 4.2 3.4 - 4.8 g/dL    Globulin 3.2 2.4 - 3.5 gm/dL    Albumin/Globulin Ratio 1.3 1.1 - 2.0 ratio    Bilirubin Total 0.7 <=1.5 mg/dL    ALP 77 40 - 150 unit/L    ALT 16 0 - 55 unit/L    AST 41 (H) 5 - 34 unit/L    eGFR 58 mL/min/1.73/m2    Anion Gap 9.0 mEq/L    BUN/Creatinine Ratio 17    Magnesium    Collection Time: 12/12/24  4:50 PM   Result Value Ref Range    Magnesium Level 2.50 1.60 - 2.60 mg/dL   Troponin I    Collection Time: 12/12/24  4:50 PM   Result Value Ref Range    Troponin-I 0.017 0.000 - 0.045 ng/mL   Protime-INR    Collection Time: 12/12/24  4:50 PM   Result Value Ref Range    PT 15.5 (H) 11.4 - 14.0 seconds    INR 1.2 <=1.3   Type & Screen    Collection Time: 12/12/24  4:50 PM   Result Value Ref Range    Group & Rh A POS     Indirect Huseyin GEL NEG     Specimen Outdate 12/15/2024 23:59    CBC with Differential    Collection Time: 12/12/24  4:50 PM   Result Value Ref Range    WBC 6.47 4.50 - 11.50 x10(3)/mcL    RBC 5.14 4.20 - 5.40 x10(6)/mcL    Hgb 14.4 12.0 - 16.0 g/dL    Hct 43.5 37.0 - 47.0 %    MCV 84.6 80.0 - 94.0 fL    MCH 28.0 27.0 - 31.0 pg    MCHC 33.1 33.0 - 36.0 g/dL    RDW 15.9 11.5 - 17.0 %    Platelet 176 130 - 400 x10(3)/mcL    MPV 9.3 7.4 - 10.4 fL    Neut % 57.6 %    Lymph % 29.7 %    Mono % 9.3 %    Eos % 2.3 %    Basophil % 0.8 %    Lymph # 1.92 0.6 - 4.6 x10(3)/mcL    Neut # 3.73 2.1 - 9.2 x10(3)/mcL    Mono # 0.60 0.1 - 1.3 x10(3)/mcL    Eos # 0.15 0 - 0.9 x10(3)/mcL    Baso # 0.05 <=0.2 x10(3)/mcL    IG# 0.02 0 - 0.04 x10(3)/mcL    IG% 0.3 %    NRBC% 0.0 %   Urinalysis, Reflex to Urine Culture     Collection Time: 12/12/24  5:01 PM    Specimen: Urine   Result Value Ref Range    Color, UA Yellow Yellow, Light-Yellow, Dark Yellow, Ruchi, Straw    Appearance, UA Clear Clear    Specific Gravity, UA 1.016 1.005 - 1.030    pH, UA 5.5 5.0 - 8.5    Protein, UA Negative Negative    Glucose, UA 4+ (A) Negative, Normal    Ketones, UA Negative Negative    Blood, UA Negative Negative    Bilirubin, UA Negative Negative    Urobilinogen, UA Normal 0.2, 1.0, Normal    Nitrites, UA 2+ (A) Negative    Leukocyte Esterase, UA 75 (A) Negative    RBC, UA 0-5 None Seen, 0-2, 3-5, 0-5 /HPF    WBC, UA 11-20 (A) None Seen, 0-2, 3-5, 0-5 /HPF    WBC Clumps, UA Occasional (A) None Seen    Bacteria, UA Moderate (A) None Seen /HPF    Squamous Epithelial Cells, UA Few (A) None Seen /HPF    Mucous, UA Trace (A) None Seen /LPF    Hyaline Casts, UA 0-2 (A) None Seen /lpf   Lactic acid, plasma    Collection Time: 12/12/24  5:01 PM   Result Value Ref Range    Lactic Acid Level 1.2 0.5 - 2.2 mmol/L   Urine culture    Collection Time: 12/12/24  5:01 PM    Specimen: Urine   Result Value Ref Range    Urine Culture >/= 100,000 colonies/ml Escherichia coli (A)        Susceptibility    Escherichia coli - KRUNAL     Ampicillin 4 Sensitive      Cefazolin (Urine) <=1 Sensitive      Cefazolin (Other) <=1 Sensitive      Cefepime <=0.12 Sensitive      Ceftriaxone <=0.25 Sensitive      Ciprofloxacin <=0.06 Sensitive      ESBL Neg Negative      Gentamicin <=1 Sensitive      Levofloxacin <=0.12 Sensitive      Meropenem <=0.25 Sensitive      Nitrofurantoin <=16 Sensitive      Piperacillin/Tazobactam <=4 Sensitive      Trimeth/Sulfa <=20 Sensitive          Follow Up:  Follow up in about 6 months (around 6/17/2025).         This note was created with the assistance of a voice recognition software or phone dictation. There may be transcription errors as a result of using this technology however minimal. Effort has been made to assure accuracy of transcription but any  obvious errors or omissions should be clarified with the author of the document

## 2025-01-29 ENCOUNTER — OFFICE VISIT (OUTPATIENT)
Dept: FAMILY MEDICINE | Facility: CLINIC | Age: 85
End: 2025-01-29
Payer: COMMERCIAL

## 2025-01-29 VITALS
OXYGEN SATURATION: 96 % | DIASTOLIC BLOOD PRESSURE: 68 MMHG | WEIGHT: 188.38 LBS | RESPIRATION RATE: 20 BRPM | HEIGHT: 63 IN | TEMPERATURE: 98 F | HEART RATE: 74 BPM | SYSTOLIC BLOOD PRESSURE: 101 MMHG | BODY MASS INDEX: 33.38 KG/M2

## 2025-01-29 DIAGNOSIS — N95.1 POST MENOPAUSAL SYNDROME: ICD-10-CM

## 2025-01-29 DIAGNOSIS — Z86.39 HISTORY OF ELEVATED GLUCOSE: ICD-10-CM

## 2025-01-29 DIAGNOSIS — E78.2 MIXED HYPERLIPIDEMIA: ICD-10-CM

## 2025-01-29 DIAGNOSIS — Z13.820 OSTEOPOROSIS SCREENING: ICD-10-CM

## 2025-01-29 DIAGNOSIS — I25.10 CORONARY ARTERY DISEASE INVOLVING NATIVE CORONARY ARTERY OF NATIVE HEART WITHOUT ANGINA PECTORIS: Primary | ICD-10-CM

## 2025-01-29 LAB
CHOLEST SERPL-MCNC: 101 MG/DL
CHOLEST/HDLC SERPL: 2 {RATIO} (ref 0–5)
EST. AVERAGE GLUCOSE BLD GHB EST-MCNC: 105.4 MG/DL
HBA1C MFR BLD: 5.3 %
HDLC SERPL-MCNC: 46 MG/DL (ref 35–60)
LDLC SERPL CALC-MCNC: 45 MG/DL (ref 50–140)
TRIGL SERPL-MCNC: 48 MG/DL (ref 37–140)
VLDLC SERPL CALC-MCNC: 10 MG/DL

## 2025-01-29 PROCEDURE — 83036 HEMOGLOBIN GLYCOSYLATED A1C: CPT | Performed by: FAMILY MEDICINE

## 2025-01-29 PROCEDURE — 80061 LIPID PANEL: CPT | Performed by: FAMILY MEDICINE

## 2025-01-29 PROCEDURE — 36415 COLL VENOUS BLD VENIPUNCTURE: CPT | Performed by: FAMILY MEDICINE

## 2025-01-29 NOTE — PROGRESS NOTES
[facility]  [facility] Geriatric Assessment Clinic Note    DOS: 1/29/2025      Subjective:  Chief Complaint:    Chief Complaint   Patient presents with    Establish Care       History of Present Illness:  Gilmar Vera is a 84 y.o. female with a PMH of CAD, CHF, Atrial Fibrillation, hearing loss, HTN, HLD,    Who presents to geriatric clinic today for:  New Patient Establishing Primary Care     Daughter silvino gives most history, translates for patient. Primary caregiver for patient, lives with their family.  Patient has recurrent UTIs from pelvic floor prolapse, has never tried pessary, wants to avoid any surgery of  Has new obamacare insurance [email or web address], waiting to go to [facility] evaluation for new hearing aids    Bilateral ankle swelling, had been increasing lasix to 2 pills daily (40mg total) recently,     Requests handicap placard     Past Medical History:   Diagnosis Date    CHF (congestive heart failure) See records    Clotting disorder     Hearing loss 2 years ago    After Covid it got worse    Hyperlipidemia     Hypertension     Obesity N/a      Past Surgical History:   Procedure Laterality Date    APPENDECTOMY      CORONARY ANGIOPLASTY  2024    HIP REPLACEMENT ARTHROPLASTY      HIP SURGERY  7 years ago    JOINT REPLACEMENT  2016    tolsillectomy        Family History   Problem Relation Name Age of Onset    Stroke Mother Ash Hernandez     Hypertension Mother Ash Hernandez     Kidney failure Father        Social History     Socioeconomic History    Marital status:     Number of children: 1   Occupational History    Occupation: retired   Tobacco Use    Smoking status: Former     Current packs/day: 0.00     Average packs/day: 0.5 packs/day for 42.0 years (21.0 ttl pk-yrs)     Types: Cigarettes     Start date: 1/1/1968     Quit date: 1/1/2010     Years since quitting: 15.0    Smokeless tobacco: Never    Tobacco comments:     I stop smoking more than 10 years ago  "  Substance and Sexual Activity    Alcohol use: Not Currently    Drug use: Never    Sexual activity: Not Currently     Social Drivers of Health     Financial Resource Strain: Medium Risk (1/24/2025)    Overall Financial Resource Strain (CARDIA)     Difficulty of Paying Living Expenses: Somewhat hard   Food Insecurity: No Food Insecurity (1/24/2025)    Hunger Vital Sign     Worried About Running Out of Food in the Last Year: Never true     Ran Out of Food in the Last Year: Never true   Physical Activity: Sufficiently Active (1/24/2025)    Exercise Vital Sign     Days of Exercise per Week: 7 days     Minutes of Exercise per Session: 90 min   Stress: No Stress Concern Present (1/24/2025)    [employer] [place] [employer] - Occupational Stress Questionnaire     Feeling of Stress : Not at all   Housing Stability: Unknown (1/24/2025)    Housing Stability Vital Sign     Unable to Pay for Housing in the Last Year: No        Health Maintenance Reviewed:  Immunization History   Administered Date(s) Administered    COVID-19, MRNA, LN-S, PF (Pfizer) (Purple Cap) 01/19/2021, 02/09/2021, 01/15/2022    COVID-19, mRNA, LNP-S, bivalent booster, PF (PFIZER OMICRON) 11/08/2022    Influenza (FLUAD) - Quadrivalent - Adjuvanted - PF *Preferred* (65+) 10/06/2022, 09/15/2023    Influenza - Quadrivalent - High Dose - PF (65 years and older) 10/30/2020, 10/02/2021    Influenza - Trivalent - Fluad - Adjuvanted - PF (65 years and older 10/29/2019    Influenza - Trivalent - Fluzone High Dose - PF (65 years and older) 11/03/2018    Pneumococcal Conjugate - 13 Valent 01/24/2016    Tdap 03/15/2023    Zoster Recombinant 10/19/2021, 02/08/2022        - Covid {donenot:40473}  - Flu {donenot:06469}  - Gardasil {donenot:55314}  - Prevnar {donenot:66843}  - Pneumovax {donenot:25868}  - Shingles {donenot:65753}  - Tetanus {donenot:90902}    HIV Screening:  No results found for: "HIV1X2", "UPS20YFFB" {donenot:34290}  Hepatitis Screening: No " "results found for: "HAV", "HEPAIGM", "HEPBIGM", "HEPBCAB", "HBEAG", "HEPCAB"   {donenot:10332}  Breast Ca Screening: {donenot:37628}  Cervical Ca Screening:  No result found {donenot:31100}  Colon Ca Screening:     {donenot:30189}  Lung Ca Screening: {donenot:08471}  AAA Screening: {donenot:12296}  PSA Screening: No results found for: "PSA", "PSATOTAL", "PSAFREE", "PSAFREEPCT"   {donenot:82735}  Osteoporosis Screening: {donenot:95294}    Review of patient's allergies indicates:   Allergen Reactions    Ferric derisomaltose Shortness Of Breath          Current Outpatient Medications:     apixaban (ELIQUIS) 5 mg Tab, Take 5 mg by mouth 2 (two) times daily., Disp: , Rfl:     atorvastatin (LIPITOR) 40 MG tablet, Take 1 tablet (40 mg total) by mouth every evening., Disp: 90 tablet, Rfl: 3    bisoprolol (ZEBETA) 5 MG tablet, Take 1 tablet (5 mg total) by mouth once daily., Disp: 30 tablet, Rfl: 11    clopidogreL (PLAVIX) 75 mg tablet, Take 1 tablet (75 mg total) by mouth once daily., Disp: 30 tablet, Rfl: 11    dapagliflozin propanediol (FARXIGA) 10 mg tablet, Take 1 tablet (10 mg total) by mouth once daily., Disp: 30 tablet, Rfl: 11    diclofenac sodium (VOLTAREN) 1 % Gel, Apply 2 g topically once daily. (Patient not taking: Reported on 2/7/2024), Disp: 400 g, Rfl: 1    furosemide (LASIX) 20 MG tablet, Take 1 tablet (20 mg total) by mouth daily as needed., Disp: 90 tablet, Rfl: 3    nitroGLYCERIN (NITROSTAT) 0.4 MG SL tablet, DISSOLVE 1 TABLET UNDER THE TONGUE EVERY 5 MINUTES AS NEEDED FOR CHEST PAIN UP TO 3 DOSES IN 15 MINUTES. IF NO RELIEF, GO TO ER, Disp: 25 tablet, Rfl: 1    pantoprazole (PROTONIX) 40 MG tablet, Take 1 tablet (40 mg total) by mouth once daily., Disp: 90 tablet, Rfl: 1    prucalopride (MOTEGRITY) 1 mg tablet, Take 1 tablet (1 mg total) by mouth once daily. (Patient not taking: Reported on 12/12/2024), Disp: 90 tablet, Rfl: 0     ROS     Objective:   Vitals:    01/29/25 1009   BP: 101/68   BP Location: " "Left arm   Patient Position: Sitting   Pulse: 74   Resp: 20   Temp: 98.2 °F (36.8 °C)   TempSrc: Oral   SpO2: 96%   Weight: 85.5 kg (188 lb 6.4 oz)   Height: 5' 2.99" (1.6 m)        Physical Exam     Geriatric Assessment:     Activities of Daily Living (ADLs):  Walking {adliadl:24879}  Transferring {adliadl:35448}  Feeding {adliadl:85112}  Bathing {adliadl:93277}  Toileting {adliadl:28256}  Dressing/Grooming {adliadl:12221}    Instrumental Activities of Daily Living (IADLs):  Finances fully dependent  Transportation fully dependent  Cooking fully dependent  Cleaning/Laundry fully dependent  Shopping fully dependent  Telephone / Communication independent  Medications independent    Cognitive Assessment:  {cogtest:83718}    Depression Assessment:       1/29/2025    10:10 AM 12/12/2024     3:10 PM 9/3/2024     8:52 AM 8/10/2024    11:24 AM 7/31/2024     9:22 AM 3/18/2024     2:44 PM 3/15/2024     8:16 AM   Depression Patient Health Questionnaire   Over the last two weeks how often have you been bothered by little interest or pleasure in doing things Not at all Not at all Not at all Not at all Not at all Not at all Not at all   Over the last two weeks how often have you been bothered by feeling down, depressed or hopeless Not at all Not at all Not at all Not at all Not at all Not at all Not at all   PHQ-2 Total Score 0 0 0 0 0 0 0       Mobility Assessment:   Falls in the past 2 years? No      - Timed Up and Go (10 ft < 12 secs): {Able/Not able:18008}  - Sit to  chair x 3 (without using arms): {Able/Not able:37282}  - Tandem stance and gait: {Able/Not able:31044}  - semi Tandem stance and gait: {Able/Not able:19106}  - Side by Side stance (> 10 secs): {Able/Not able:71499}  - One Legged stance: {Able/Not able:99326}  - Functional Reach (>7in/18cm): {Able/Not able:65306}    Assistive Devices:   straight cane  Glasses: Yes - due for next eye exam  Hearing Aids:  pending [employer] eval and purchase  Dentures: Yes - " Full set upper and lower     Social support/Living Situation:   Housing: Lives with daughter and family  Family support: as above  Driving? No  Accidents in past 2 years?  No   Left appliances on/ water running / doors open / other safety concerns?  No   Polypharmacy Identified? (>9 meds) No    Advanced Care Planning:  - [place] POST: not done yet, counseled patient and information provided  - Medical POA: not done yet, counseled patient and information provided    Recent labs:  CBC:  Lab Results   Component Value Date    WBC 6.47 12/12/2024    RBC 5.14 12/12/2024    HGB 14.4 12/12/2024    HCT 43.5 12/12/2024    MCV 84.6 12/12/2024    MCH 28.0 12/12/2024    MCHC 33.1 12/12/2024    RDW 15.9 12/12/2024     12/12/2024    MPV 9.3 12/12/2024      CMP:  Sodium   Date Value Ref Range Status   12/12/2024 140 136 - 145 mmol/L Final     Potassium   Date Value Ref Range Status   12/12/2024 3.8 3.5 - 5.1 mmol/L Final     Chloride   Date Value Ref Range Status   12/12/2024 107 98 - 107 mmol/L Final     CO2   Date Value Ref Range Status   12/12/2024 24 23 - 31 mmol/L Final     Blood Urea Nitrogen   Date Value Ref Range Status   12/12/2024 16.6 9.8 - 20.1 mg/dL Final     Creatinine   Date Value Ref Range Status   12/12/2024 0.97 0.55 - 1.02 mg/dL Final     Calcium   Date Value Ref Range Status   12/12/2024 10.7 (H) 8.4 - 10.2 mg/dL Final     Albumin   Date Value Ref Range Status   12/12/2024 4.2 3.4 - 4.8 g/dL Final     Bilirubin Total   Date Value Ref Range Status   12/12/2024 0.7 <=1.5 mg/dL Final     ALP   Date Value Ref Range Status   12/12/2024 77 40 - 150 unit/L Final     AST   Date Value Ref Range Status   12/12/2024 41 (H) 5 - 34 unit/L Final     ALT   Date Value Ref Range Status   12/12/2024 16 0 - 55 unit/L Final     Estimated GFR-Non    Date Value Ref Range Status   08/18/2021 78 (L) >>=90 mL/min/1.73 m2 Final      BMP:  Lab Results   Component Value Date     12/12/2024    K 3.8 12/12/2024    CL  "107 12/12/2024    CO2 24 12/12/2024    BUN 16.6 12/12/2024    CREATININE 0.97 12/12/2024    CALCIUM 10.7 (H) 12/12/2024    EGFRNONAA 78 (L) 08/18/2021      Lipid Panel:  Lab Results   Component Value Date    CHOL 101 02/07/2024    CHOL 85 09/26/2023    CHOL 101 08/06/2021     Lab Results   Component Value Date    HDL 44 02/07/2024    HDL 37 09/26/2023    HDL 39 08/06/2021     No results found for: "LDLCALC"  Lab Results   Component Value Date    TRIG 37 02/07/2024    TRIG 62 09/26/2023    TRIG 67 08/06/2021     No results found for: "CHOLHDL"   HbA1c:  Lab Results   Component Value Date    HGBA1C 5.8 07/31/2024      TSH:  Lab Results   Component Value Date    TSH 0.961 07/31/2024       Recent Imaging:  X-Ray Chest AP Portable  Narrative: EXAMINATION:  XR CHEST AP PORTABLE    CLINICAL HISTORY:  Hypotension;    TECHNIQUE:  One view    COMPARISON:  August 10, 2024.    FINDINGS:  Cardiopericardial silhouette is within normal limits.  No acute dense focal or segmental consolidation, congestive process, pleural effusions or pneumothorax.  Impression: No acute intracranial findings identified.    Electronically signed by: Jaylan Devi  Date:    12/12/2024  Time:    17:06       Assessment & Plan:    Gilmar Vera is presenting as above and will be treated as follows:    There are no diagnoses linked to this encounter.       Follow Up in [facility] in {Days/weeks/months/years:18688}        Answers submitted by the patient for this visit:  Review of Systems Questionnaire (Submitted on 1/24/2025)  activity change: No  unexpected weight change: No  rhinorrhea: No  trouble swallowing: No  visual disturbance: Yes  chest tightness: No  polyuria: No  difficulty urinating: No  menstrual problem: No  joint swelling: No  arthralgias: Yes  confusion: No  dysphoric mood: No    "

## 2025-02-11 ENCOUNTER — CLINICAL SUPPORT (OUTPATIENT)
Dept: CARDIOLOGY | Facility: CLINIC | Age: 85
End: 2025-02-11
Payer: COMMERCIAL

## 2025-02-11 VITALS
RESPIRATION RATE: 18 BRPM | DIASTOLIC BLOOD PRESSURE: 64 MMHG | BODY MASS INDEX: 33.28 KG/M2 | WEIGHT: 187.81 LBS | OXYGEN SATURATION: 96 % | HEART RATE: 65 BPM | TEMPERATURE: 99 F | SYSTOLIC BLOOD PRESSURE: 100 MMHG | HEIGHT: 63 IN

## 2025-02-11 DIAGNOSIS — R60.0 PERIPHERAL EDEMA: ICD-10-CM

## 2025-02-11 DIAGNOSIS — I50.20 HEART FAILURE WITH REDUCED EJECTION FRACTION: ICD-10-CM

## 2025-02-11 DIAGNOSIS — I95.9 HYPOTENSION, UNSPECIFIED HYPOTENSION TYPE: Primary | ICD-10-CM

## 2025-02-11 PROCEDURE — 99211 OFF/OP EST MAY X REQ PHY/QHP: CPT | Mod: S$PBB,,, | Performed by: NURSE PRACTITIONER

## 2025-02-11 PROCEDURE — 99213 OFFICE O/P EST LOW 20 MIN: CPT | Mod: PBBFAC

## 2025-02-11 RX ORDER — FUROSEMIDE 20 MG/1
20 TABLET ORAL 2 TIMES DAILY PRN
Qty: 90 TABLET | Refills: 3 | Status: SHIPPED | OUTPATIENT
Start: 2025-02-11 | End: 2027-02-01

## 2025-02-11 RX ORDER — NITROFURANTOIN 25; 75 MG/1; MG/1
100 CAPSULE ORAL 2 TIMES DAILY
COMMUNITY
Start: 2024-12-15

## 2025-02-11 NOTE — PROGRESS NOTES
Patient is here today for a follow up BP check. Patient reports symptoms resolved since stopping spironolactone. [facility] states she is back to baseline. She reports her lasix prescription is not accurate. She has been taking lasix 20 mg twice daily as needed. He prescription was for only once daily as needed. Today's vitals assessed and WNL. Corrected prescription sent to [facility] pharmacy. Patient daughter states her PCP suggest discontinuing Farxiga if appropriate due to frequent infections. Per Sylvester Donald, okay to discontinue per renal if unable to tolerate.  Instructions to keep scheduled follow up appointments and call clinic with questions or concerns.

## 2025-03-12 ENCOUNTER — OFFICE VISIT (OUTPATIENT)
Dept: UROLOGY | Facility: CLINIC | Age: 85
End: 2025-03-12
Payer: COMMERCIAL

## 2025-03-12 ENCOUNTER — HOSPITAL ENCOUNTER (OUTPATIENT)
Dept: RADIOLOGY | Facility: HOSPITAL | Age: 85
Discharge: HOME | End: 2025-03-12
Attending: FAMILY MEDICINE
Payer: COMMERCIAL

## 2025-03-12 VITALS
HEART RATE: 68 BPM | SYSTOLIC BLOOD PRESSURE: 94 MMHG | RESPIRATION RATE: 20 BRPM | HEIGHT: 64 IN | OXYGEN SATURATION: 96 % | TEMPERATURE: 98 F | DIASTOLIC BLOOD PRESSURE: 69 MMHG | WEIGHT: 184.38 LBS | BODY MASS INDEX: 31.48 KG/M2

## 2025-03-12 DIAGNOSIS — Z13.820 OSTEOPOROSIS SCREENING: ICD-10-CM

## 2025-03-12 DIAGNOSIS — Z87.440 HISTORY OF RECURRENT UTIS: Primary | ICD-10-CM

## 2025-03-12 DIAGNOSIS — N39.0 RECURRENT UTI: ICD-10-CM

## 2025-03-12 DIAGNOSIS — N95.1 POST MENOPAUSAL SYNDROME: ICD-10-CM

## 2025-03-12 LAB
BILIRUB SERPL-MCNC: NORMAL MG/DL
BLOOD URINE, POC: NORMAL
COLOR, POC UA: YELLOW
GLUCOSE UR QL STRIP: 500
KETONES UR QL STRIP: NORMAL
LEUKOCYTE ESTERASE URINE, POC: NORMAL
NITRITE, POC UA: NORMAL
PH, POC UA: 5.5
PROTEIN, POC: NORMAL
SPECIFIC GRAVITY, POC UA: 1.01
UROBILINOGEN, POC UA: 0.2

## 2025-03-12 PROCEDURE — 77080 DXA BONE DENSITY AXIAL: CPT | Mod: TC

## 2025-03-12 PROCEDURE — 81001 URINALYSIS AUTO W/SCOPE: CPT | Mod: PBBFAC | Performed by: NURSE PRACTITIONER

## 2025-03-12 PROCEDURE — 99214 OFFICE O/P EST MOD 30 MIN: CPT | Mod: PBBFAC | Performed by: NURSE PRACTITIONER

## 2025-03-12 PROCEDURE — 87086 URINE CULTURE/COLONY COUNT: CPT | Performed by: NURSE PRACTITIONER

## 2025-03-12 NOTE — PROGRESS NOTES
Chief Complaint:   Chief Complaint   Patient presents with    eval for recurrent UTI'S       HPI:   Patient is a 84-year-old female with a referral for recurrent UTIs.  Patient seen by PCP with 2 documented UTIs with positive E coli in the past 5 months.  Patient other urinary tract issues but no UTIs present with the bacteria culture urine.  Patient was a past medical history of CHF, clotting disorder, hyperlipidemia, hypertension, a former smoker of 21 pack years a proximally half a pack a day from 4924-5553.currently on Farxiga approximately 10 months which has a increased incidents of UTI's  Today patient presents denies any abnormal Urologic symptoms, UA reveals small WBC's  & trace RBC's.      Allergies:  Review of patient's allergies indicates:   Allergen Reactions    Ferric derisomaltose Shortness Of Breath       Medications:  Current Medications[1]    Review of Systems:  General: No fever, chills, fatigability, or weight loss.  Skin: No rashes, itching, or changes in color or texture of skin.  Chest: Denies RHOADES, cyanosis, wheezing, cough, and sputum production.  Abdomen: Appetite fine. No weight loss. Denies diarrhea, abdominal pain, hematemesis, or blood in stool.  Musculoskeletal: No joint stiffness or swelling. Denies back pain.  : As above.  All other review of systems negative.    PMH:  Past Medical History:   Diagnosis Date    CHF (congestive heart failure) See records    Clotting disorder     Hearing loss 2 years ago    After Covid it got worse    Hyperlipidemia     Hypertension     Obesity N/a       PSH:  Past Surgical History:   Procedure Laterality Date    APPENDECTOMY      CORONARY ANGIOPLASTY  2024    HIP REPLACEMENT ARTHROPLASTY      HIP SURGERY  7 years ago    JOINT REPLACEMENT  2016    tolsillectomy         FamHx:  Family History   Problem Relation Name Age of Onset    Stroke Mother Ash Hernandez     Hypertension Mother Ash Hernandez     Kidney failure Father         SocHx:  Social  History[2]    Physical Exam:  There were no vitals filed for this visit.  General: A&Ox3, no apparent distress, no deformities  Neck: No masses, normal thyroid  Lungs: CTA abby, no use of accessory muscles  Heart: RRR, no arrhythmias  Abdomen: Soft, NT, ND, no masses, no hernias, no hepatosplenomegaly  Lymphatic: Neck and groin nodes negative  Skin: The skin is warm and dry. No jaundice.  Ext: No c/c/e.    Urinalysis:  Component  Ref Range & Units (hover) 11:45   Color, UA Yellow   Spec Grav UA 1.015   pH, UA 5.5   WBC, UA small   Nitrite, UA neg   Protein, POC neg   Glucose,    Ketones, UA neg   Urobilinogen, UA 0.2   Bilirubin, POC neg   Blood, UA trace-intact      Microscopic urinalysis revealed WBCs 1-2 per HPF, RBCs 0-1 per HPF, bacteria negative, epithelial cells 1-2 per HPF, nitrites       Specimen Collected: 03/12/25 11:45 CDT Last Resulted: 03/12/25 11:45 CDT             Impression:  1. History of recurrent UTIs  - POCT URINE DIPSTICK WITH MICROSCOPE, AUTOMATED      Plan:  Instructed patient to confer with Cardiologist with discontinuing Farxiga.   Start Premarin vaginal cream daily  Instructed patient on timed voiding every 2-3 hrs, double voiding, avoidance of bladder irritants such as alcohol, citrus foods, chocolate, caffeinated drinks, energy drinks, spicy foods, sugar, caffeine products, sodas. Instructed patient to avoid drinking fluids 1-2 hours prior to bedtime & void immediately before bedtime.   RTC 1 month.  Instructed patient if develops any abnormal urologic symptoms notify clinic to be re-evaluate treated or during after hours go to emergency room versus urgent here.                           [facility]         [1]   Current Outpatient Medications   Medication Sig Dispense Refill    apixaban (ELIQUIS) 5 mg Tab Take 5 mg by mouth 2 (two) times daily.      atorvastatin (LIPITOR) 40 MG tablet Take 1 tablet (40 mg total) by mouth every evening. 90 tablet 3    bisoprolol (ZEBETA) 5 MG tablet Take 1  tablet (5 mg total) by mouth once daily. 30 tablet 11    clopidogreL (PLAVIX) 75 mg tablet Take 1 tablet (75 mg total) by mouth once daily. 30 tablet 11    dapagliflozin propanediol (FARXIGA) 10 mg tablet Take 1 tablet (10 mg total) by mouth once daily. 30 tablet 11    furosemide (LASIX) 20 MG tablet Take 1 tablet (20 mg total) by mouth 2 (two) times daily as needed. 90 tablet 3    nitrofurantoin, macrocrystal-monohydrate, (MACROBID) 100 MG capsule Take 100 mg by mouth 2 (two) times daily.      nitroGLYCERIN (NITROSTAT) 0.4 MG SL tablet DISSOLVE 1 TABLET UNDER THE TONGUE EVERY 5 MINUTES AS NEEDED FOR CHEST PAIN UP TO 3 DOSES IN 15 MINUTES. IF NO RELIEF, GO TO ER 25 tablet 1     No current facility-administered medications for this visit.   [2]   Social History  Socioeconomic History    Marital status:     Number of children: 1   Occupational History    Occupation: retired   Tobacco Use    Smoking status: Former     Current packs/day: 0.00     Average packs/day: 0.5 packs/day for 42.0 years (21.0 ttl pk-yrs)     Types: Cigarettes     Start date: 1/1/1968     Quit date: 1/1/2010     Years since quitting: 15.2    Smokeless tobacco: Never    Tobacco comments:     I stop smoking more than 10 years ago   Substance and Sexual Activity    Alcohol use: Not Currently    Drug use: Never    Sexual activity: Not Currently     Social Drivers of Health     Financial Resource Strain: Medium Risk (1/24/2025)    Overall Financial Resource Strain (CARDIA)     Difficulty of Paying Living Expenses: Somewhat hard   Food Insecurity: No Food Insecurity (1/24/2025)    Hunger Vital Sign     Worried About Running Out of Food in the Last Year: Never true     Ran Out of Food in the Last Year: Never true   Physical Activity: Sufficiently Active (1/24/2025)    Exercise Vital Sign     Days of Exercise per Week: 7 days     Minutes of Exercise per Session: 90 min   Stress: No Stress Concern Present (1/24/2025)    [employer] [place] [employer]  [employer] - Occupational Stress Questionnaire     Feeling of Stress : Not at all   Housing Stability: Unknown (1/24/2025)    Housing Stability Vital Sign     Unable to Pay for Housing in the Last Year: No

## 2025-03-14 ENCOUNTER — TELEPHONE (OUTPATIENT)
Dept: UROLOGY | Facility: CLINIC | Age: 85
End: 2025-03-14
Payer: COMMERCIAL

## 2025-03-14 DIAGNOSIS — N39.0 RECURRENT UTI: Primary | ICD-10-CM

## 2025-03-14 LAB — BACTERIA UR CULT: ABNORMAL

## 2025-03-14 RX ORDER — LEVOFLOXACIN 500 MG/1
500 TABLET, FILM COATED ORAL DAILY
Qty: 7 TABLET | Refills: 0 | Status: SHIPPED | OUTPATIENT
Start: 2025-03-14

## 2025-03-14 NOTE — TELEPHONE ENCOUNTER
Addended by: MARGARET CLARKE on: 8/18/2022 09:13 AM     Modules accepted: Brandon     Spoke to patient's daughter Maria Del Rosario regarding patient has a UTI Klebsiella pneumoniae SSP pneumoniae sensitive to Levaquin therefore a sent Levaquin 500 mg p.o. daily and will repeat urine culture in 10 days to determine if bacteria eradicated.

## 2025-04-07 ENCOUNTER — LAB VISIT (OUTPATIENT)
Dept: LAB | Facility: HOSPITAL | Age: 85
End: 2025-04-07
Attending: NURSE PRACTITIONER
Payer: COMMERCIAL

## 2025-04-07 ENCOUNTER — OFFICE VISIT (OUTPATIENT)
Dept: CARDIOLOGY | Facility: CLINIC | Age: 85
End: 2025-04-07
Payer: COMMERCIAL

## 2025-04-07 VITALS
RESPIRATION RATE: 18 BRPM | OXYGEN SATURATION: 98 % | BODY MASS INDEX: 30.34 KG/M2 | WEIGHT: 188.81 LBS | TEMPERATURE: 98 F | HEIGHT: 66 IN | DIASTOLIC BLOOD PRESSURE: 70 MMHG | HEART RATE: 74 BPM | SYSTOLIC BLOOD PRESSURE: 109 MMHG

## 2025-04-07 DIAGNOSIS — E78.2 MIXED HYPERLIPIDEMIA: ICD-10-CM

## 2025-04-07 DIAGNOSIS — I48.20 CHRONIC ATRIAL FIBRILLATION: ICD-10-CM

## 2025-04-07 DIAGNOSIS — I65.23 BILATERAL CAROTID ARTERY STENOSIS: ICD-10-CM

## 2025-04-07 DIAGNOSIS — I25.10 CORONARY ARTERY DISEASE INVOLVING NATIVE CORONARY ARTERY OF NATIVE HEART WITHOUT ANGINA PECTORIS: Primary | ICD-10-CM

## 2025-04-07 DIAGNOSIS — Z87.440 HISTORY OF RECURRENT UTIS: ICD-10-CM

## 2025-04-07 DIAGNOSIS — R60.0 PERIPHERAL EDEMA: ICD-10-CM

## 2025-04-07 DIAGNOSIS — I42.8 NICM (NONISCHEMIC CARDIOMYOPATHY): ICD-10-CM

## 2025-04-07 DIAGNOSIS — R06.09 DOE (DYSPNEA ON EXERTION): ICD-10-CM

## 2025-04-07 DIAGNOSIS — I50.20 HEART FAILURE WITH REDUCED EJECTION FRACTION: ICD-10-CM

## 2025-04-07 PROCEDURE — 99214 OFFICE O/P EST MOD 30 MIN: CPT | Mod: PBBFAC | Performed by: NURSE PRACTITIONER

## 2025-04-07 PROCEDURE — 1101F PT FALLS ASSESS-DOCD LE1/YR: CPT | Mod: CPTII,,, | Performed by: NURSE PRACTITIONER

## 2025-04-07 PROCEDURE — 3288F FALL RISK ASSESSMENT DOCD: CPT | Mod: CPTII,,, | Performed by: NURSE PRACTITIONER

## 2025-04-07 PROCEDURE — 1160F RVW MEDS BY RX/DR IN RCRD: CPT | Mod: CPTII,,, | Performed by: NURSE PRACTITIONER

## 2025-04-07 PROCEDURE — 3078F DIAST BP <80 MM HG: CPT | Mod: CPTII,,, | Performed by: NURSE PRACTITIONER

## 2025-04-07 PROCEDURE — 87086 URINE CULTURE/COLONY COUNT: CPT

## 2025-04-07 PROCEDURE — 1159F MED LIST DOCD IN RCRD: CPT | Mod: CPTII,,, | Performed by: NURSE PRACTITIONER

## 2025-04-07 PROCEDURE — 99214 OFFICE O/P EST MOD 30 MIN: CPT | Mod: S$PBB,,, | Performed by: NURSE PRACTITIONER

## 2025-04-07 PROCEDURE — 3074F SYST BP LT 130 MM HG: CPT | Mod: CPTII,,, | Performed by: NURSE PRACTITIONER

## 2025-04-07 RX ORDER — FUROSEMIDE 20 MG/1
20 TABLET ORAL 2 TIMES DAILY
Qty: 180 TABLET | Refills: 7 | Status: SHIPPED | OUTPATIENT
Start: 2025-04-07 | End: 2027-03-28

## 2025-04-07 NOTE — PROGRESS NOTES
CHIEF COMPLAINT:   Chief Complaint   Patient presents with    Follow-up     Denies any cardiac problems                   Review of patient's allergies indicates:   Allergen Reactions    Ferric derisomaltose Shortness Of Breath                                          HPI:  Gilmar Vera 84 y.o. female with a past medical history of CAD s/p PCI RCA (April 2024), NICM (recovered) (EF prev. 40% - now 60% per ECHO 11.12.24), Chronic Atrial Fibrillation (on Eliquis), carotid artery stenosis and VHD who presents to cardiology clinic for routine follow up and ongoing care.  Latest Echocardiogram obtained on 11.12.24  for continued surveillance of VHD revealed a preserved EF of 60%, mild-to-moderate MR and mild-to-moderate TR which is stable from previous Echo completed in September 2022.    Most recent ischemic evaluation includes a Left Heart Catheterization in April 2024 in the setting of unstable angina with PCI of RCA for a reported 99% stenosis. (done in the patient's home country of [facility]).   Of note, the patient completed a previous Lexiscan Stress Test in June 2020 that was abnormal, demonstrating lateral-apical ischemia with no scar and no wall motion abnormality that was previously medically managed.  Notably, patient underwent a prior Left Heart Catheterization in 2017 that revealed nonobstructive CAD (55% eccentric stenosis proximal mid RCA and 30% distal stenosis to mLAD).      The patient's daughter reports that the patient also completed and Echocardiogram and Carotid Ultrasound prior to the [place] returning to the [place] with stable results.    Patient presents to clinic today reporting that she feels good since discontinuation of spironolactone.  She denies any signs of heart failure including shortness of breath, increased peripheral edema, PND or orthopnea.  The patient appears euvolemic on exam, without any overt signs or symptoms of volume overload.  She also denies any concerning anginal symptoms  of chest pain, RHOADES, palpitations, lightheadedness, dizziness, near-syncope or syncope.  The patient reports that she also has been able to increase overall activity at home.  She is able to perform her routine ADLs and some light housework including helping with cooking, loading and unloading the  etc and denies experiencing any angina/angina equivalent symptoms.      Patient's daughter is translating during clinic visit.       CARDIAC TESTING:  Echocardiogram 9.30.22:  The left ventricle is normal in size with eccentric hypertrophy and low normal systolic function.  The estimated ejection fraction is 50%.  Indeterminate left ventricular diastolic function.  Mild right ventricular enlargement with low normal right ventricular systolic function.  Moderate right atrial enlargement.  Mild to moderate pulmonic regurgitation.  Mild-to-moderate mitral regurgitation.  Mild to moderate tricuspid regurgitation.  Mild left atrial enlargement.  There is mild pulmonary hypertension.    Lexiscan Stress Test 6/2020:   Lexiscan stress test June 2020:  Scan is consistent with: Lateral-apical ischemia  No scar  Ejection fraction: 90%  No wall motion abnormality    Echocardiogram (12/2019): EF of 50 to 55% with mild MR, mild TR and RVSP of 26 mmHg    [facility] (2017): Dominant RCA with moderate atherosclerotic plaque and 55% eccentric stenosis proximal mid vessel, LAD with mild atherosclerotic plaque and 30% distal stenosis, 3-4+ MR, Normal LVEF 60%                                                                                                                                                                                                                                               Patient Active Problem List   Diagnosis    Chronic atrial fibrillation    Coronary artery disease involving native coronary artery of native heart without angina pectoris    RHOADES (dyspnea on exertion)    Peripheral edema    Hyperlipidemia    NICM  (nonischemic cardiomyopathy)    Recurrent UTI     Past Surgical History:   Procedure Laterality Date    APPENDECTOMY      CORONARY ANGIOPLASTY  2024    HIP REPLACEMENT ARTHROPLASTY      HIP SURGERY  7 years ago    JOINT REPLACEMENT  2016    tolsillectomy       Social History     Socioeconomic History    Marital status:     Number of children: 1   Occupational History    Occupation: retired   Tobacco Use    Smoking status: Former     Current packs/day: 0.00     Average packs/day: 0.5 packs/day for 42.0 years (21.0 ttl pk-yrs)     Types: Cigarettes     Start date: 1/1/1968     Quit date: 1/1/2010     Years since quitting: 15.2     Passive exposure: Current    Smokeless tobacco: Never    Tobacco comments:     I stop smoking more than 10 years ago   Substance and Sexual Activity    Alcohol use: Not Currently    Drug use: Never    Sexual activity: Not Currently     Social Drivers of Health     Financial Resource Strain: Medium Risk (1/24/2025)    Overall Financial Resource Strain (CARDIA)     Difficulty of Paying Living Expenses: Somewhat hard   Food Insecurity: No Food Insecurity (1/24/2025)    Hunger Vital Sign     Worried About Running Out of Food in the Last Year: Never true     Ran Out of Food in the Last Year: Never true   Physical Activity: Sufficiently Active (1/24/2025)    Exercise Vital Sign     Days of Exercise per Week: 7 days     Minutes of Exercise per Session: 90 min   Stress: No Stress Concern Present (1/24/2025)    [employer] - Occupational Stress Questionnaire     Feeling of Stress : Not at all   Housing Stability: Unknown (1/24/2025)    Housing Stability Vital Sign     Unable to Pay for Housing in the Last Year: No        Family History   Problem Relation Name Age of Onset    Stroke Mother Ash Hernandez     Hypertension Mother Ash Hernandez     Kidney failure Father           Current Outpatient Medications:     apixaban (ELIQUIS) 5 mg Tab, Take 5 mg by mouth 2 (two)  "times daily., Disp: , Rfl:     atorvastatin (LIPITOR) 40 MG tablet, Take 1 tablet (40 mg total) by mouth every evening., Disp: 90 tablet, Rfl: 3    bisoprolol (ZEBETA) 5 MG tablet, Take 1 tablet (5 mg total) by mouth once daily., Disp: 30 tablet, Rfl: 11    clopidogreL (PLAVIX) 75 mg tablet, Take 1 tablet (75 mg total) by mouth once daily., Disp: 30 tablet, Rfl: 11    conjugated estrogens (PREMARIN) vaginal cream, Place 0.5 g vaginally once daily., Disp: 1 applicator, Rfl: 5    dapagliflozin propanediol (FARXIGA) 10 mg tablet, Take 1 tablet (10 mg total) by mouth once daily., Disp: 30 tablet, Rfl: 11    furosemide (LASIX) 20 MG tablet, Take 1 tablet (20 mg total) by mouth 2 (two) times daily as needed., Disp: 90 tablet, Rfl: 3    nitroGLYCERIN (NITROSTAT) 0.4 MG SL tablet, DISSOLVE 1 TABLET UNDER THE TONGUE EVERY 5 MINUTES AS NEEDED FOR CHEST PAIN UP TO 3 DOSES IN 15 MINUTES. IF NO RELIEF, GO TO ER, Disp: 25 tablet, Rfl: 1    levoFLOXacin (LEVAQUIN) 500 MG tablet, Take 1 tablet (500 mg total) by mouth once daily. (Patient not taking: Reported on 4/7/2025), Disp: 7 tablet, Rfl: 0    nitrofurantoin, macrocrystal-monohydrate, (MACROBID) 100 MG capsule, Take 100 mg by mouth 2 (two) times daily. (Patient not taking: Reported on 4/7/2025), Disp: , Rfl:      ROS:                                                                                                                                                                             Review of Systems   Constitutional:  Positive for malaise/fatigue.   Respiratory:  Negative for shortness of breath.    Cardiovascular:  Negative for chest pain and leg swelling.   Gastrointestinal: Negative.    Skin: Negative.    Neurological:  Positive for dizziness and weakness.   Psychiatric/Behavioral: Negative.          Blood pressure 109/70, pulse 74, temperature 97.8 °F (36.6 °C), temperature source Oral, resp. rate 18, height 5' 6" (1.676 m), weight 85.6 kg (188 lb 12.8 oz), SpO2 98%. "   PE:  Physical Exam  Constitutional:       Appearance: Normal appearance.   HENT:      Head: Normocephalic and atraumatic.   Eyes:      Extraocular Movements: Extraocular movements intact.      Pupils: Pupils are equal, round, and reactive to light.   Cardiovascular:      Rate and Rhythm: Normal rate. Rhythm irregular.   Pulmonary:      Effort: Pulmonary effort is normal.      Breath sounds: Normal breath sounds.   Abdominal:      Palpations: Abdomen is soft.   Musculoskeletal:         General: Normal range of motion.      Cervical back: Normal range of motion.   Skin:     General: Skin is warm and dry.   Neurological:      General: No focal deficit present.      Mental Status: She is alert and oriented to person, place, and time.   Psychiatric:         Mood and Affect: Mood normal.         Behavior: Behavior normal.       ASSESSMENT/PLAN:  CAD s/p PCI RCA per [facility]  April 2024  - [facility] - PCI RCA per [facility] April 2024 in patient's home country [facility]   - Abnormal Nuclear Stress Test - lateral-apical ischemia with no scar,EF of 90% with no wall motion abnormality (6.5.20).  Previously medically managed  - [facility] - Dominant RCA with moderate atherosclerotic plaque and 55% stenosis in proximal and mid vessel, LAD with mid atherosclerotic plaque and 30% distal stenosis (2017)  - Lexiscan - positive for lateral-apical ischemia with no scar, EF of 90% with no wall motion abnormality (6.25.20)  - Denies any CP or SOB  - Continue Plavix 75 mg, Atorvastatin 40 mg, bisoprolol 5 mg, and SL Nitro prn CP  - Counseled on heart healthy diet and increased activity as tolerated    NICM (Recovered EF) - NYHC Class II  - EF -60%, mild to mod MR, and mild to mod TR per ECHO 11.12.24  - Recovered EF 50% Sept. 2022-->EF-50-55% per ECHO Dec. 2019-->EF-40%  - ECHO - EF of 50 to 55% with mild MR, mild TR and RVSP of 26 mmHg (improved from EF 40%) (12/2019)  - Denies SOB or RHOADES.  - Euvolemic upon exam  - Continue GDMT: Bisoprolol 5 mg.  The  patient endorses recurrent UTIs with Farxiga.  Will hold for now.  Spironolactone and Losartan previously discontinued due to symptomatic hypotension  - Continue Lasix 20 mg BID  - Counseled on low salt diet and s/s of volume overload     Atrial Fibrillation, chronic   - Denies any palpitations  - Rate Control: Continue Bisoprolol 5 mg. Metoprolol tartrate previously discontinued  - Anticoagulation: Continue Eliquis 5 mg twice daily.  Denies any adverse bleeding issues but does report intermittent bleeding hemorrhoids. H/H stable       HLD  - LDL-50 at goal   - Continue Atorvastatin 40 mg daily. Recently increased during  hospitalization   - Advised on heart healthy, low-cholesterol diet and exercise as tolerated    VHD- Asymptomatic   - EF- 60%, mild-to-moderate MR and mild-to-moderate TR per ECHO 11.12.24  - EF- 50%, mild to Moderate AR, mild to moderate MR and mild to moderate TR per ECHO 9.30.22  - Continue to monitor with routine surveillance ECHOs    Carotid Artery Stenosis  - Patient reportedly completed an Carotid US and was found to minimal stenosis  - Obtain Carotid US for continued surveillance of carotid artery stenosis      Carotid US   Hold Jardiance for now due to recurrent UTIs  Follow up in Cardiology Clinic in 3 months or sooner if needed   Follow up with PCP as directed

## 2025-04-07 NOTE — PATIENT INSTRUCTIONS
Carotid US   Hold Jardiance for now due to recurrent UTIs  Follow up in Cardiology Clinic in 3 months or sooner if needed   Follow up with PCP as directed

## 2025-04-10 LAB — BACTERIA UR CULT: NO GROWTH

## 2025-04-30 ENCOUNTER — OFFICE VISIT (OUTPATIENT)
Dept: FAMILY MEDICINE | Facility: CLINIC | Age: 85
End: 2025-04-30
Payer: COMMERCIAL

## 2025-04-30 VITALS
BODY MASS INDEX: 30.18 KG/M2 | HEIGHT: 66 IN | OXYGEN SATURATION: 96 % | WEIGHT: 187.81 LBS | HEART RATE: 63 BPM | TEMPERATURE: 98 F | SYSTOLIC BLOOD PRESSURE: 95 MMHG | DIASTOLIC BLOOD PRESSURE: 62 MMHG

## 2025-04-30 DIAGNOSIS — N39.0 RECURRENT UTI: ICD-10-CM

## 2025-04-30 DIAGNOSIS — M85.88 OSTEOPENIA OF LUMBAR SPINE: ICD-10-CM

## 2025-04-30 DIAGNOSIS — E78.2 MIXED HYPERLIPIDEMIA: ICD-10-CM

## 2025-04-30 DIAGNOSIS — I25.10 CORONARY ARTERY DISEASE INVOLVING NATIVE CORONARY ARTERY OF NATIVE HEART WITHOUT ANGINA PECTORIS: ICD-10-CM

## 2025-04-30 DIAGNOSIS — I48.20 CHRONIC ATRIAL FIBRILLATION: Primary | ICD-10-CM

## 2025-04-30 DIAGNOSIS — I10 PRIMARY HYPERTENSION: ICD-10-CM

## 2025-04-30 PROBLEM — M81.0 AGE-RELATED OSTEOPOROSIS WITHOUT CURRENT PATHOLOGICAL FRACTURE: Status: ACTIVE | Noted: 2025-04-30

## 2025-04-30 PROCEDURE — 99213 OFFICE O/P EST LOW 20 MIN: CPT | Mod: PBBFAC | Performed by: FAMILY MEDICINE

## 2025-04-30 NOTE — PROGRESS NOTES
[facility]  [facility] Assessment [facility] Note    DOS: 4/30/2025      Subjective:  Chief Complaint:    Chief Complaint   Patient presents with    Follow-up    wellness       History of Present Illness:  Gilmar Vera is a 84 y.o. female with a PMH of CAD, CHF, Atrial Fibrillation, hearing loss, HTN, HLD,    Who presents to geriatric clinic today for:  Follow up of Chronic Conditions: Wellness    Daughter silvino gives most history, translates for patient. Primary caregiver for patient, lives with their family.  Patient has recurrent UTIs from pelvic floor prolapse, has never tried pessary, wants to avoid any surgery of  Has new [employer] insurance [employer], waiting to go to [employer] evaluation for new hearing aids    Bilateral ankle swelling, had been increasing lasix to 2 pills daily (40mg total) recently.    4/30/2025: No complaints today.Family has questions regarding urinalysis and treating UTIs. Otherwise recent UA culture clear. Osteoporosis on DXA but only in radius, hip and spine are osteopenic. Asking about cranberry supplement.    Past Medical History:   Diagnosis Date    Age-related osteoporosis without current pathological fracture 4/30/2025    CHF (congestive heart failure) See records    Clotting disorder     Hearing loss 2 years ago    After Covid it got worse    Hyperlipidemia     Hypertension     Obesity N/a      Past Surgical History:   Procedure Laterality Date    APPENDECTOMY      CORONARY ANGIOPLASTY  2024    HIP REPLACEMENT ARTHROPLASTY      HIP SURGERY  7 years ago    JOINT REPLACEMENT  2016    tolsillectomy        Family History   Problem Relation Name Age of Onset    Stroke Mother Ash Hernandez     Hypertension Mother Ash David     Kidney failure Father        Social History     Socioeconomic History    Marital status:     Number of children: 1   Occupational History    Occupation: retired   Tobacco Use    Smoking status: Former     Current  packs/day: 0.00     Average packs/day: 0.5 packs/day for 42.0 years (21.0 ttl pk-yrs)     Types: Cigarettes     Start date: 1/1/1968     Quit date: 1/1/2010     Years since quitting: 15.3     Passive exposure: Current    Smokeless tobacco: Never    Tobacco comments:     I stop smoking more than 10 years ago   Substance and Sexual Activity    Alcohol use: Not Currently    Drug use: Never    Sexual activity: Not Currently     Social Drivers of Health     Financial Resource Strain: Medium Risk (1/24/2025)    Overall Financial Resource Strain (CARDIA)     Difficulty of Paying Living Expenses: Somewhat hard   Food Insecurity: No Food Insecurity (1/24/2025)    Hunger Vital Sign     Worried About Running Out of Food in the Last Year: Never true     Ran Out of Food in the Last Year: Never true   Physical Activity: Sufficiently Active (1/24/2025)    Exercise Vital Sign     Days of Exercise per Week: 7 days     Minutes of Exercise per Session: 90 min   Stress: No Stress Concern Present (1/24/2025)    [employer] - Occupational Stress Questionnaire     Feeling of Stress : Not at all   Housing Stability: Unknown (1/24/2025)    Housing Stability Vital Sign     Unable to Pay for Housing in the Last Year: No        Health Maintenance Reviewed:  Immunization History   Administered Date(s) Administered    COVID-19, MRNA, LN-S, PF (Pfizer) (Purple Cap) 01/19/2021, 02/09/2021, 01/15/2022    COVID-19, mRNA, LNP-S, bivalent booster, PF (PFIZER OMICRON) 11/08/2022    Influenza (FLUAD) - Quadrivalent - Adjuvanted - PF *Preferred* (65+) 10/06/2022, 09/15/2023    Influenza - Quadrivalent - High Dose - PF (65 years and older) 10/30/2020, 10/02/2021    Influenza - Trivalent - Fluad - Adjuvanted - PF (65 years and older 10/29/2019    Influenza - Trivalent - Fluzone High Dose - PF (65 years and older) 11/03/2018    Pneumococcal Conjugate - 13 Valent 01/24/2016    Tdap 03/15/2023    Zoster Recombinant 10/19/2021,  02/08/2022        - Covid done, date: 11/8/2022 (4 doses)  - Flu done, date: 9/15/2023 -> due  - Prevnar done, date: 1/2016  - Pneumovax done, date: 1/2016  - Shingles done, date: 2/8/2022  - Tetanus done, date: 3/15/2023  Breast Ca Screening: done, date: 11/ 2020  Osteoporosis Screening: done, date: 3/12/2025 , Osteoporosis    Review of patient's allergies indicates:   Allergen Reactions    Ferric derisomaltose Shortness Of Breath          Current Outpatient Medications:     apixaban (ELIQUIS) 5 mg Tab, Take 1 tablet (5 mg total) by mouth 2 (two) times daily., Disp: 60 tablet, Rfl: 11    atorvastatin (LIPITOR) 40 MG tablet, Take 1 tablet (40 mg total) by mouth every evening., Disp: 90 tablet, Rfl: 3    bisoprolol (ZEBETA) 5 MG tablet, Take 1 tablet (5 mg total) by mouth once daily., Disp: 30 tablet, Rfl: 11    clopidogreL (PLAVIX) 75 mg tablet, Take 1 tablet (75 mg total) by mouth once daily., Disp: 30 tablet, Rfl: 11    conjugated estrogens (PREMARIN) vaginal cream, Place 0.5 g vaginally once daily., Disp: 1 applicator, Rfl: 5    dapagliflozin propanediol (FARXIGA) 10 mg tablet, Take 1 tablet (10 mg total) by mouth once daily., Disp: 30 tablet, Rfl: 11    furosemide (LASIX) 20 MG tablet, Take 1 tablet (20 mg total) by mouth 2 (two) times a day., Disp: 180 tablet, Rfl: 7    levoFLOXacin (LEVAQUIN) 500 MG tablet, Take 1 tablet (500 mg total) by mouth once daily. (Patient not taking: Reported on 4/7/2025), Disp: 7 tablet, Rfl: 0    nitrofurantoin, macrocrystal-monohydrate, (MACROBID) 100 MG capsule, Take 100 mg by mouth 2 (two) times daily. (Patient not taking: Reported on 4/7/2025), Disp: , Rfl:     nitroGLYCERIN (NITROSTAT) 0.4 MG SL tablet, DISSOLVE 1 TABLET UNDER THE TONGUE EVERY 5 MINUTES AS NEEDED FOR CHEST PAIN UP TO 3 DOSES IN 15 MINUTES. IF NO RELIEF, GO TO ER, Disp: 25 tablet, Rfl: 1     Review of Systems   All other systems reviewed and are negative.       Objective:   There were no vitals filed for this  visit.       Physical Exam  Constitutional:       General: She is not in acute distress.     Appearance: Normal appearance.   HENT:      Head: Normocephalic.   Eyes:      Pupils: Pupils are equal, round, and reactive to light.   Cardiovascular:      Rate and Rhythm: Normal rate.   Pulmonary:      Effort: Pulmonary effort is normal.   Abdominal:      General: Abdomen is flat.   Musculoskeletal:         General: Normal range of motion.   Skin:     General: Skin is warm.      Capillary Refill: Capillary refill takes less than 2 seconds.   Neurological:      General: No focal deficit present.      Mental Status: She is alert.   Psychiatric:         Mood and Affect: Mood normal.          Geriatric Assessment:     Activities of Daily Living (ADLs):  Walking independent  Transferring independent  Feeding independent  Bathing independent  Toileting independent  Dressing/Grooming independent    Instrumental Activities of Daily Living (IADLs):  Finances fully dependent  Transportation fully dependent  Cooking fully dependent  Cleaning/Laundry fully dependent  Shopping fully dependent  Telephone / Communication independent  Medications independent      Depression Assessment:       4/7/2025     2:08 PM 1/29/2025    10:10 AM 12/12/2024     3:10 PM 9/3/2024     8:52 AM 8/10/2024    11:24 AM 7/31/2024     9:22 AM 3/18/2024     2:44 PM   Depression Patient Health Questionnaire   Over the last two weeks how often have you been bothered by little interest or pleasure in doing things Not at all Not at all Not at all Not at all Not at all Not at all Not at all   Over the last two weeks how often have you been bothered by feeling down, depressed or hopeless Not at all Not at all Not at all Not at all Not at all Not at all Not at all   PHQ-2 Total Score 0 0 0 0 0 0 0       Assistive Devices:   straight cane  Glasses: Yes - due for next eye exam  Hearing Aids:  pending [employer] eval and purchase  Dentures: Yes - Full set upper and lower      Social support/Living Situation:   Housing: Lives with daughter and family  Family support: as above  Driving? No  Accidents in past 2 years?  No   Left appliances on/ water running / doors open / other safety concerns?  No   Polypharmacy Identified? (>9 meds) No    Advanced Care Planning:  - [place] POST: not done yet, counseled patient and information provided  - Medical POA: not done yet, counseled patient and information provided    Recent labs:  CBC:  Lab Results   Component Value Date    WBC 6.47 12/12/2024    RBC 5.14 12/12/2024    HGB 14.4 12/12/2024    HCT 43.5 12/12/2024    MCV 84.6 12/12/2024    MCH 28.0 12/12/2024    MCHC 33.1 12/12/2024    RDW 15.9 12/12/2024     12/12/2024    MPV 9.3 12/12/2024      CMP:  Sodium   Date Value Ref Range Status   12/12/2024 140 136 - 145 mmol/L Final     Potassium   Date Value Ref Range Status   12/12/2024 3.8 3.5 - 5.1 mmol/L Final     Chloride   Date Value Ref Range Status   12/12/2024 107 98 - 107 mmol/L Final     CO2   Date Value Ref Range Status   12/12/2024 24 23 - 31 mmol/L Final     Glucose   Date Value Ref Range Status   12/12/2024 114 82 - 115 mg/dL Final     Blood Urea Nitrogen   Date Value Ref Range Status   12/12/2024 16.6 9.8 - 20.1 mg/dL Final     Creatinine   Date Value Ref Range Status   12/12/2024 0.97 0.55 - 1.02 mg/dL Final     Calcium   Date Value Ref Range Status   12/12/2024 10.7 (H) 8.4 - 10.2 mg/dL Final     Protein Total   Date Value Ref Range Status   12/12/2024 7.4 5.8 - 7.6 gm/dL Final     Albumin   Date Value Ref Range Status   12/12/2024 4.2 3.4 - 4.8 g/dL Final     Bilirubin Total   Date Value Ref Range Status   12/12/2024 0.7 <=1.5 mg/dL Final     ALP   Date Value Ref Range Status   12/12/2024 77 40 - 150 unit/L Final     AST   Date Value Ref Range Status   12/12/2024 41 (H) 5 - 34 unit/L Final     ALT   Date Value Ref Range Status   12/12/2024 16 0 - 55 unit/L Final     Estimated GFR-Non    Date Value Ref Range  "Status   08/18/2021 78 (L) >>=90 mL/min/1.73 m2 Final      BMP:  Lab Results   Component Value Date     12/12/2024    K 3.8 12/12/2024     12/12/2024    CO2 24 12/12/2024    BUN 16.6 12/12/2024    CREATININE 0.97 12/12/2024    CALCIUM 10.7 (H) 12/12/2024    EGFRNONAA 78 (L) 08/18/2021      Lipid Panel:  Lab Results   Component Value Date    CHOL 101 01/29/2025    CHOL 101 02/07/2024    CHOL 85 09/26/2023     Lab Results   Component Value Date    HDL 46 01/29/2025    HDL 44 02/07/2024    HDL 37 09/26/2023     No results found for: "LDLCALC"  Lab Results   Component Value Date    TRIG 48 01/29/2025    TRIG 37 02/07/2024    TRIG 62 09/26/2023     No results found for: "CHOLHDL"   HbA1c:  Lab Results   Component Value Date    HGBA1C 5.3 01/29/2025      TSH:  Lab Results   Component Value Date    TSH 0.961 07/31/2024       Recent Imaging:  DXA Bone Density Axial Skeleton 1 or more sites  Narrative: EXAMINATION:  DXA BONE DENSITY AXIAL SKELETON 1 OR MORE SITES    CLINICAL HISTORY:  Menopausal and female climacteric states    COMPARISON:  None    FINDINGS:  L1 to L4 vertebral bone mineral density is equal to 1.214 g/cm squared with a T score of 0.2.    Right femoral neck bone mineral density is equal to 0.829 g/cm squared with a T score of -1.5.    Bone mineral density in the right radius 33% measures 0.492 which corresponds to a T-score of -3.0.  Impression: 1. Osteoporosis according to WHO criteria.    Electronically signed by: Aguila Zambrano MD  Date:    03/12/2025  Time:    17:08       Assessment & Plan:    Gilmar Vera is presenting as above and will be treated as follows:    Gilmar was seen today for follow-up and wellness.    Diagnoses and all orders for this visit:    Chronic atrial fibrillation  - Continue Eliquis    Mixed hyperlipidemia  - Continue Statin    ICMO with Hx of HFmrEF (50% --> 60% now 11/2024)  Coronary artery disease involving native coronary artery of native heart without angina " pectoris  - Continue Antiplatelets  - Stop Farxiga (risk vs. Benefit with her hx of colonization)   - Continue Bisoprolol  - SL Nitro PRN    Primary hypertension  - On lower side today 95/62     Age-related osteopenia without current pathological fracture  - Osteopenia in Large Joints and Femur  - Radius T -3, however not clinically significant    Recurrent UTI  - Hx more consistent with colonization, though there are predilections to treatment perhaps from a anatomical/urological standpoint    Follow Up in [facility] in 6 months    Damian Boyd MD  [facility] INTERNAL MEDICINE PGY-3  04/30/2025 10:33 AM  [facility]

## 2025-05-10 ENCOUNTER — OFFICE VISIT (OUTPATIENT)
Dept: URGENT CARE | Facility: CLINIC | Age: 85
End: 2025-05-10
Payer: COMMERCIAL

## 2025-05-10 VITALS
BODY MASS INDEX: 30.7 KG/M2 | HEIGHT: 66 IN | SYSTOLIC BLOOD PRESSURE: 111 MMHG | OXYGEN SATURATION: 97 % | HEART RATE: 62 BPM | TEMPERATURE: 98 F | DIASTOLIC BLOOD PRESSURE: 73 MMHG | WEIGHT: 191 LBS | RESPIRATION RATE: 18 BRPM

## 2025-05-10 DIAGNOSIS — H61.23 BILATERAL IMPACTED CERUMEN: Primary | ICD-10-CM

## 2025-05-10 PROCEDURE — 69210 REMOVE IMPACTED EAR WAX UNI: CPT | Mod: PBBFAC | Performed by: NURSE PRACTITIONER

## 2025-05-10 PROCEDURE — 99214 OFFICE O/P EST MOD 30 MIN: CPT | Mod: PBBFAC | Performed by: NURSE PRACTITIONER

## 2025-05-10 NOTE — PROGRESS NOTES
"Subjective:      Patient ID: Gilmar Vera is a 84 y.o. female.    Vitals:  height is 5' 6" (1.676 m) and weight is 86.6 kg (191 lb). Her temperature is 97.9 °F (36.6 °C). Her blood pressure is 111/73 and her pulse is 62. Her respiration is 18 and oxygen saturation is 97%.     Chief Complaint: Cerumen Impaction (Pt c/o ear wax impaction )    HPI Pt is accompanied by her granddaughter who request ear wax removal as she went to seek hearing test and was unable to proceed due to ear wax impaction as there recommended that ear wax impaction be removed prior to testing to ensure accuracy.  Patient denies fever, visual disturbances, dizziness headache shortness for breath or chest pain, cough, sore throat, ear pain  ROS   Objective:     Physical Exam   Constitutional: She appears well-developed.  Non-toxic appearance. She does not appear ill. No distress.   HENT:   Head: Normocephalic and atraumatic.   Ears:   Right Ear: Tympanic membrane normal. No no drainage, swelling or tenderness. Tympanic membrane is not injected and not erythematous. impacted cerumen  Left Ear: Tympanic membrane normal. No no drainage, swelling or tenderness. Tympanic membrane is not injected and not erythematous. impacted cerumen  Nose: No purulent discharge. Right sinus exhibits no maxillary sinus tenderness and no frontal sinus tenderness. Left sinus exhibits no maxillary sinus tenderness and no frontal sinus tenderness.   Mouth/Throat: Uvula is midline.   After removal of impaction , pt has bilateral ear canal normal, no swelling, no erythema to TM.       Comments: After removal of impaction , pt has bilateral ear canal normal, no swelling, no erythema to TM.   Eyes: Right eye exhibits no discharge. Left eye exhibits no discharge. Extraocular movement intact   Neck: Neck supple. No neck rigidity present.   Cardiovascular: Regular rhythm.   Pulmonary/Chest: Effort normal and breath sounds normal. No respiratory distress. She has no wheezes. She " has no rhonchi. She has no rales.   Abdominal: Normal appearance.   Lymphadenopathy:     She has no cervical adenopathy.   Neurological: She is alert.   Skin: Skin is warm, dry and not diaphoretic.   Psychiatric: Her behavior is normal.   Nursing note and vitals reviewed.      Assessment:     1. Bilateral impacted cerumen        Plan:       Bilateral impacted cerumen  -     Ear Cerumen Removal    Other orders  -     carbamide peroxide (DEBROX) 6.5 % otic solution; Place 5 drops into both ears 2 (two) times daily. Can repeat monthly. for 4 days  Dispense: 15 mL; Refill: 0

## 2025-05-10 NOTE — PATIENT INSTRUCTIONS
Please follow instructions on patient education material.      Return to urgent care in 2 to 3 days if symptoms are not improving, immediately if you develop any new or worsening symptoms.     See patient education.  Debrox Rx was sent to your pharmacy, however this is an over-the-counter medication and can be purchased if not covered by your insurance. Use this for maintenance of wax. As directed on packaging for 4 days in a row, once per month.  Stop using Qtips to remove wax from your ears.   If anitbiotic ear drops were prescribed for you, start those today.

## 2025-07-08 ENCOUNTER — OFFICE VISIT (OUTPATIENT)
Dept: CARDIOLOGY | Facility: CLINIC | Age: 85
End: 2025-07-08
Payer: COMMERCIAL

## 2025-07-08 VITALS
HEART RATE: 66 BPM | HEIGHT: 66 IN | RESPIRATION RATE: 18 BRPM | DIASTOLIC BLOOD PRESSURE: 70 MMHG | TEMPERATURE: 98 F | OXYGEN SATURATION: 96 % | SYSTOLIC BLOOD PRESSURE: 103 MMHG | BODY MASS INDEX: 30.47 KG/M2 | WEIGHT: 189.63 LBS

## 2025-07-08 DIAGNOSIS — I48.20 CHRONIC ATRIAL FIBRILLATION: ICD-10-CM

## 2025-07-08 DIAGNOSIS — R06.09 DOE (DYSPNEA ON EXERTION): ICD-10-CM

## 2025-07-08 DIAGNOSIS — I10 PRIMARY HYPERTENSION: Primary | ICD-10-CM

## 2025-07-08 DIAGNOSIS — R60.0 PERIPHERAL EDEMA: ICD-10-CM

## 2025-07-08 DIAGNOSIS — I50.20 HEART FAILURE WITH REDUCED EJECTION FRACTION: ICD-10-CM

## 2025-07-08 DIAGNOSIS — I42.8 NICM (NONISCHEMIC CARDIOMYOPATHY): ICD-10-CM

## 2025-07-08 DIAGNOSIS — I95.9 HYPOTENSION, UNSPECIFIED HYPOTENSION TYPE: ICD-10-CM

## 2025-07-08 DIAGNOSIS — E78.2 MIXED HYPERLIPIDEMIA: ICD-10-CM

## 2025-07-08 DIAGNOSIS — I25.10 CORONARY ARTERY DISEASE INVOLVING NATIVE CORONARY ARTERY OF NATIVE HEART WITHOUT ANGINA PECTORIS: ICD-10-CM

## 2025-07-08 DIAGNOSIS — I65.23 BILATERAL CAROTID ARTERY STENOSIS: ICD-10-CM

## 2025-07-08 PROCEDURE — 1160F RVW MEDS BY RX/DR IN RCRD: CPT | Mod: CPTII,,, | Performed by: NURSE PRACTITIONER

## 2025-07-08 PROCEDURE — 1101F PT FALLS ASSESS-DOCD LE1/YR: CPT | Mod: CPTII,,, | Performed by: NURSE PRACTITIONER

## 2025-07-08 PROCEDURE — 1159F MED LIST DOCD IN RCRD: CPT | Mod: CPTII,,, | Performed by: NURSE PRACTITIONER

## 2025-07-08 PROCEDURE — 3078F DIAST BP <80 MM HG: CPT | Mod: CPTII,,, | Performed by: NURSE PRACTITIONER

## 2025-07-08 PROCEDURE — 3074F SYST BP LT 130 MM HG: CPT | Mod: CPTII,,, | Performed by: NURSE PRACTITIONER

## 2025-07-08 PROCEDURE — 3288F FALL RISK ASSESSMENT DOCD: CPT | Mod: CPTII,,, | Performed by: NURSE PRACTITIONER

## 2025-07-08 PROCEDURE — 99214 OFFICE O/P EST MOD 30 MIN: CPT | Mod: S$PBB,,, | Performed by: NURSE PRACTITIONER

## 2025-07-08 PROCEDURE — 99214 OFFICE O/P EST MOD 30 MIN: CPT | Mod: PBBFAC | Performed by: NURSE PRACTITIONER

## 2025-07-08 RX ORDER — CLOPIDOGREL BISULFATE 75 MG/1
75 TABLET ORAL DAILY
Qty: 30 TABLET | Refills: 11 | Status: SHIPPED | OUTPATIENT
Start: 2025-07-08 | End: 2026-07-08

## 2025-07-08 RX ORDER — BISOPROLOL FUMARATE 5 MG/1
5 TABLET, FILM COATED ORAL DAILY
Qty: 30 TABLET | Refills: 11 | Status: SHIPPED | OUTPATIENT
Start: 2025-07-08 | End: 2026-07-08

## 2025-07-08 RX ORDER — NITROGLYCERIN 0.4 MG/1
TABLET SUBLINGUAL
Qty: 25 TABLET | Refills: 3 | Status: SHIPPED | OUTPATIENT
Start: 2025-07-08

## 2025-07-08 NOTE — PATIENT INSTRUCTIONS
Carotid US  Follow up in Cardiology Clinic in 3 months or sooner if needed   Follow up with PCP as directed  Strict ED precautions

## 2025-07-08 NOTE — PROGRESS NOTES
CHIEF COMPLAINT:   Chief Complaint   Patient presents with    Follow-up     Patient c/o having chest pain after getting upset relieved with taking 1 NTG.                   Review of patient's allergies indicates:   Allergen Reactions    Ferric derisomaltose Shortness Of Breath                                          HPI:  Gilmar Vera 84 y.o. female with a past medical history of CAD s/p PCI RCA (April 2024), NICM (recovered) (EF prev. 40% - now 60% per ECHO 11.12.24), Chronic Atrial Fibrillation (on Eliquis), carotid artery stenosis and VHD who presents to cardiology clinic for routine follow up and ongoing care.  Latest Echocardiogram obtained on 11.12.24  for continued surveillance of VHD revealed a preserved EF of 60%, mild-to-moderate MR and mild-to-moderate TR which is stable from previous Echo completed in September 2022.    Most recent ischemic evaluation includes a Left Heart Catheterization in April 2024 in the setting of unstable angina with PCI of RCA for a reported 99% stenosis. (done in the patient's home [place] [facility]).   Of note, the patient completed a previous Lexiscan Stress Test in June 2020 that was abnormal, demonstrating lateral-apical ischemia with no scar and no wall motion abnormality that was previously medically managed.  Previous Left Heart Catheterization in 2017 revealed nonobstructive CAD (55% eccentric stenosis proximal mid RCA and 30% distal stenosis to mLAD).      The patient's daughter reports the patient also completed an Echocardiogram and Carotid Ultrasound prior to the [place] returning [place] the [place] with stable results.    Today the patient reports an episode of palpitations that occurred during a time in which she was very upset.  She describes it as a sensation of feeling her heart race for several minutes.  She reported relief with rest and one sublingual nitroglycerin.  She denies experiencing any actual chest pain pressure tightness or heaviness and denies  symptoms similar to what she previously experienced prior to her stents in \[facility] [place] [facility]\"".  She denies any reoccurrence of symptoms and is careful to not upset herself.  The patient reportedly remains able to complete her ADLs and light housework such as cooking and unloading/loading the  without experiencing any anginal or anginal equivalent symptoms.    On exam today, the patient does have some lower extremity edema, greater on the right.  However she admits to missing her dose of Lasix this afternoon to reduce urinary frequency while away from home today.  She states her lower extremity edema is usually stable with her current dose of diuretic.  She denies any other symptoms of volume overload including shortness of breath, orthopnea, PND, abdominal distention or early satiety.           Patient's daughter is translating during clinic visit.       CARDIAC TESTING:  Echocardiogram 9.30.22:  The left ventricle is normal in size with eccentric hypertrophy and low normal systolic function.  The estimated ejection fraction is 50%.  Indeterminate left ventricular diastolic function.  Mild right ventricular enlargement with low normal right ventricular systolic function.  Moderate right atrial enlargement.  Mild to moderate pulmonic regurgitation.  Mild-to-moderate mitral regurgitation.  Mild to moderate tricuspid regurgitation.  Mild left atrial enlargement.  There is mild pulmonary hypertension.    Lexiscan Stress Test 6/2020:   Lexiscan stress test June 2020:  Scan is consistent with: Lateral-apical ischemia  No scar  Ejection fraction: 90%  No wall motion abnormality    Echocardiogram (12/2019): EF of 50 to 55% with mild MR, mild TR and RVSP of 26 mmHg    [facility] (2017): Dominant RCA with moderate atherosclerotic plaque and 55% eccentric stenosis proximal mid vessel, LAD with mild atherosclerotic plaque and 30% distal stenosis, 3-4+ MR, Normal LVEF 60%                                                                                                                                                                                                                                                Patient Active Problem List   Diagnosis    Chronic atrial fibrillation    Coronary artery disease involving native coronary artery of native heart without angina pectoris    RHOADES (dyspnea on exertion)    Peripheral edema    Hyperlipidemia    NICM (nonischemic cardiomyopathy)    Recurrent UTI    Osteopenia of lumbar spine    Primary hypertension     Past Surgical History:   Procedure Laterality Date    APPENDECTOMY      CORONARY ANGIOPLASTY  2024    HIP REPLACEMENT ARTHROPLASTY      HIP SURGERY  7 years ago    JOINT REPLACEMENT  2016    tolsillectomy       Social History     Socioeconomic History    Marital status:     Number of children: 1   Occupational History    Occupation: retired   Tobacco Use    Smoking status: Former     Current packs/day: 0.00     Average packs/day: 0.5 packs/day for 42.0 years (21.0 ttl pk-yrs)     Types: Cigarettes     Start date: 1/1/1968     Quit date: 1/1/2010     Years since quitting: 15.5     Passive exposure: Current    Smokeless tobacco: Never    Tobacco comments:     I stop smoking more than 10 years ago   Substance and Sexual Activity    Alcohol use: Not Currently    Drug use: Never    Sexual activity: Not Currently     Social Drivers of Health     Financial Resource Strain: Medium Risk (1/24/2025)    Overall Financial Resource Strain (CARDIA)     Difficulty of Paying Living Expenses: Somewhat hard   Food Insecurity: No Food Insecurity (1/24/2025)    Hunger Vital Sign     Worried About Running Out of Food in the Last Year: Never true     Ran Out of Food in the Last Year: Never true   Physical Activity: Sufficiently Active (1/24/2025)    Exercise Vital Sign     Days of Exercise per Week: 7 days     Minutes of Exercise per Session: 90 min   Stress: No Stress Concern Present (1/24/2025)    [employer] [place]  [employer] - Occupational Stress Questionnaire     Feeling of Stress : Not at all   Housing Stability: Unknown (1/24/2025)    Housing Stability Vital Sign     Unable to Pay for Housing in the Last Year: No        Family History   Problem Relation Name Age of Onset    Stroke Mother Ash Hernandez     Hypertension Mother Ash Hernandez     Kidney failure Father           Current Outpatient Medications:     apixaban (ELIQUIS) 5 mg Tab, Take 1 tablet (5 mg total) by mouth 2 (two) times daily., Disp: 60 tablet, Rfl: 11    atorvastatin (LIPITOR) 40 MG tablet, Take 1 tablet (40 mg total) by mouth every evening., Disp: 90 tablet, Rfl: 3    bisoprolol (ZEBETA) 5 MG tablet, Take 1 tablet (5 mg total) by mouth once daily., Disp: 30 tablet, Rfl: 11    clopidogreL (PLAVIX) 75 mg tablet, Take 1 tablet (75 mg total) by mouth once daily., Disp: 30 tablet, Rfl: 11    conjugated estrogens (PREMARIN) vaginal cream, Place 0.5 g vaginally once daily., Disp: 1 applicator, Rfl: 5    furosemide (LASIX) 20 MG tablet, Take 1 tablet (20 mg total) by mouth 2 (two) times a day., Disp: 180 tablet, Rfl: 7    nitroGLYCERIN (NITROSTAT) 0.4 MG SL tablet, DISSOLVE 1 TABLET UNDER THE TONGUE EVERY 5 MINUTES AS NEEDED FOR CHEST PAIN UP TO 3 DOSES IN 15 MINUTES. IF NO RELIEF, GO TO ER, Disp: 25 tablet, Rfl: 1     ROS:                                                                                                                                                                             Review of Systems   Respiratory:  Negative for shortness of breath.    Cardiovascular:  Positive for palpitations and leg swelling. Negative for chest pain, orthopnea, claudication and PND.   Gastrointestinal: Negative.    Skin: Negative.    Psychiatric/Behavioral: Negative.          Blood pressure 103/70, pulse 66, temperature 97.6 °F (36.4 °C), temperature source Oral, resp. rate 18, height 5' 6" (1.676 m), weight 86 kg (189 lb 9.6 oz), SpO2 96%. "   PE:  Physical Exam  Constitutional:       Appearance: Normal appearance.   HENT:      Head: Normocephalic and atraumatic.   Eyes:      Extraocular Movements: Extraocular movements intact.      Pupils: Pupils are equal, round, and reactive to light.   Cardiovascular:      Rate and Rhythm: Normal rate. Rhythm irregular.   Pulmonary:      Effort: Pulmonary effort is normal.      Breath sounds: Normal breath sounds.   Abdominal:      Palpations: Abdomen is soft.   Musculoskeletal:         General: Normal range of motion.      Cervical back: Normal range of motion.      Right lower leg: Edema present.      Left lower leg: Edema present.   Skin:     General: Skin is warm and dry.   Neurological:      General: No focal deficit present.      Mental Status: She is alert and oriented to person, place, and time.   Psychiatric:         Mood and Affect: Mood normal.         Behavior: Behavior normal.       ASSESSMENT/PLAN:  Palpitations   CAD s/p PCI RCA per [facility]  April 2024  - [facility] - PCI RCA per [facility] April 2024 in patient's home [place] [facility]   - Abnormal Nuclear Stress Test - lateral-apical ischemia with no scar,EF of 90% with no wall motion abnormality (6.5.20).  Previously medically managed  - [facility] - Dominant RCA with moderate atherosclerotic plaque and 55% stenosis in proximal and mid vessel, LAD with mid atherosclerotic plaque and 30% distal stenosis (2017)  - Lexiscan - positive for lateral-apical ischemia with no scar, EF of 90% with no wall motion abnormality (6.25.20)  - Reports one episode of palpitations that occurred while upset.  The patient denies experiencing any exertional chest pain or exertional dyspnea.  Reports relief of symptoms with rest and 1 sublingual nitroglycerin.  Denies any reoccurrence.  Instructed the patient endorsed to notify clinic if she experiences any anginal/anginal equivalent symptoms.  Will plan to optimize medications  - Continue Plavix 75 mg, Atorvastatin 40 mg, bisoprolol 5 mg, and  SL Nitro prn CP  - Counseled on heart healthy diet and increased activity as tolerated    NICM (Recovered EF) - [facility] Class [facility]  - EF -60%, mild to mod MR, and mild to mod TR per ECHO 11.12.24  - Recovered EF 50% Sept. 2022-->EF-50-55% per ECHO Dec. 2019-->EF-40%  - ECHO - EF of 50 to 55% with mild MR, mild TR and RVSP of 26 mmHg (improved from EF 40%) (12/2019)  - Denies SOB or RHOADES. Reports intermittent BLE (R>L at baseline)  - 1-2+ LE appreciated on exam.  The patient has not taking afternoon dose of Lasix, likely contributing to Lower extremity edema.  Instructed patient to notify clinic of any persistent edema  - Continue GDMT: Bisoprolol 5 mg.  Farxiga discontinued at last visit due to recurrent UTIs.  Spironolactone and Losartan previously discontinued due to symptomatic hypotension  - Continue Lasix 20 mg BID  - Counseled on low salt diet and s/s of volume overload     Atrial Fibrillation, chronic   - Denies any palpitations  - Rate Control: Continue Bisoprolol 5 mg. Metoprolol tartrate previously discontinued  - Anticoagulation: Continue Eliquis 5 mg twice daily.  Denies any adverse bleeding issues      HLD  - LDL-45 at goal   - Continue Atorvastatin 40 mg daily. Recently increased during  hospitalization   - Advised on heart healthy, low-cholesterol diet and exercise as tolerated    VHD- Asymptomatic   - EF- 60%, mild-to-moderate MR and mild-to-moderate TR per ECHO 11.12.24  - EF- 50%, mild to Moderate HI, mild to moderate MR and mild to moderate TR per ECHO 9.30.22  - Continue to monitor with surveillance ECHOs every 1-2 years or sooner if clinically indicated    Carotid Artery Stenosis  - Patient reportedly completed an Carotid US and was found to minimal stenosis  - Obtain Carotid US for continued surveillance of carotid artery stenosis      Carotid US  Follow up in Cardiology Clinic in 3 months or sooner if needed   Follow up with PCP as directed  Strict ED precautions

## 2025-07-24 ENCOUNTER — HOSPITAL ENCOUNTER (OUTPATIENT)
Dept: RADIOLOGY | Facility: HOSPITAL | Age: 85
Discharge: HOME | End: 2025-07-24
Attending: NURSE PRACTITIONER
Payer: COMMERCIAL

## 2025-07-24 DIAGNOSIS — I65.23 BILATERAL CAROTID ARTERY STENOSIS: ICD-10-CM

## 2025-07-24 LAB
LEFT CCA DIST DIAS: 16 CM/S
LEFT CCA DIST SYS: 55 CM/S
LEFT CCA PROX DIAS: 11 CM/S
LEFT CCA PROX SYS: 71 CM/S
LEFT ECA DIAS: 12 CM/S
LEFT ECA SYS: 93 CM/S
LEFT ICA DIST DIAS: 25 CM/S
LEFT ICA DIST SYS: 68 CM/S
LEFT ICA MID DIAS: 24 CM/S
LEFT ICA MID SYS: 59 CM/S
LEFT ICA PROX DIAS: 10 CM/S
LEFT ICA PROX SYS: 36 CM/S
LEFT VERTEBRAL DIAS: 11 CM/S
LEFT VERTEBRAL SYS: 50 CM/S
OHS CV CAROTID RIGHT ICA EDV HIGHEST: 32
OHS CV CAROTID ULTRASOUND LEFT ICA/CCA RATIO: 1.24
OHS CV CAROTID ULTRASOUND RIGHT ICA/CCA RATIO: 1.01
OHS CV PV CAROTID LEFT HIGHEST CCA: 71
OHS CV PV CAROTID LEFT HIGHEST ICA: 68
OHS CV PV CAROTID RIGHT HIGHEST CCA: 86
OHS CV PV CAROTID RIGHT HIGHEST ICA: 72
OHS CV US CAROTID LEFT HIGHEST EDV: 25
RIGHT CCA DIST DIAS: 14 CM/S
RIGHT CCA DIST SYS: 71 CM/S
RIGHT CCA PROX DIAS: 22 CM/S
RIGHT CCA PROX SYS: 86 CM/S
RIGHT ECA DIAS: 16 CM/S
RIGHT ECA SYS: 109 CM/S
RIGHT ICA DIST DIAS: 32 CM/S
RIGHT ICA DIST SYS: 72 CM/S
RIGHT ICA MID DIAS: 17 CM/S
RIGHT ICA MID SYS: 44 CM/S
RIGHT ICA PROX DIAS: 9 CM/S
RIGHT ICA PROX SYS: 42 CM/S
RIGHT VERTEBRAL DIAS: 15 CM/S
RIGHT VERTEBRAL SYS: 37 CM/S

## 2025-07-24 PROCEDURE — 93880 EXTRACRANIAL BILAT STUDY: CPT

## 2025-07-28 ENCOUNTER — TELEPHONE (OUTPATIENT)
Dept: CARDIOLOGY | Facility: CLINIC | Age: 85
End: 2025-07-28
Payer: COMMERCIAL

## 2025-07-28 NOTE — TELEPHONE ENCOUNTER
Discussed results of carotid ultrasound with the patient's daughter.  All questions answered.  Verbalizes understanding.